# Patient Record
Sex: MALE | Race: ASIAN | NOT HISPANIC OR LATINO | URBAN - METROPOLITAN AREA
[De-identification: names, ages, dates, MRNs, and addresses within clinical notes are randomized per-mention and may not be internally consistent; named-entity substitution may affect disease eponyms.]

---

## 2015-10-30 RX ORDER — MEMANTINE HYDROCHLORIDE 10 MG/1
1 TABLET ORAL
Qty: 0 | Refills: 0 | DISCHARGE
Start: 2015-10-30

## 2018-05-14 ENCOUNTER — EMERGENCY (EMERGENCY)
Facility: HOSPITAL | Age: 78
LOS: 1 days | Discharge: HOME | End: 2018-05-14
Attending: EMERGENCY MEDICINE | Admitting: EMERGENCY MEDICINE
Payer: MEDICARE

## 2018-05-14 VITALS
TEMPERATURE: 98 F | RESPIRATION RATE: 18 BRPM | DIASTOLIC BLOOD PRESSURE: 85 MMHG | OXYGEN SATURATION: 99 % | HEART RATE: 88 BPM | SYSTOLIC BLOOD PRESSURE: 127 MMHG

## 2018-05-14 VITALS
RESPIRATION RATE: 20 BRPM | SYSTOLIC BLOOD PRESSURE: 122 MMHG | TEMPERATURE: 98 F | DIASTOLIC BLOOD PRESSURE: 87 MMHG | OXYGEN SATURATION: 97 % | HEART RATE: 90 BPM | WEIGHT: 179.9 LBS

## 2018-05-14 LAB
ALBUMIN SERPL ELPH-MCNC: 4.4 G/DL — SIGNIFICANT CHANGE UP (ref 3.3–5)
ALP SERPL-CCNC: 95 U/L — SIGNIFICANT CHANGE UP (ref 40–120)
ALT FLD-CCNC: 17 U/L — SIGNIFICANT CHANGE UP (ref 10–45)
ANION GAP SERPL CALC-SCNC: 12 MMOL/L — SIGNIFICANT CHANGE UP (ref 5–17)
APTT BLD: 35 SEC — SIGNIFICANT CHANGE UP (ref 27.5–37.4)
AST SERPL-CCNC: 16 U/L — SIGNIFICANT CHANGE UP (ref 10–40)
BASOPHILS # BLD AUTO: 0 K/UL — SIGNIFICANT CHANGE UP (ref 0–0.2)
BASOPHILS NFR BLD AUTO: 0.5 % — SIGNIFICANT CHANGE UP (ref 0–2)
BILIRUB SERPL-MCNC: 0.7 MG/DL — SIGNIFICANT CHANGE UP (ref 0.2–1.2)
BUN SERPL-MCNC: 13 MG/DL — SIGNIFICANT CHANGE UP (ref 7–23)
CALCIUM SERPL-MCNC: 9.6 MG/DL — SIGNIFICANT CHANGE UP (ref 8.4–10.5)
CHLORIDE SERPL-SCNC: 99 MMOL/L — SIGNIFICANT CHANGE UP (ref 96–108)
CO2 SERPL-SCNC: 28 MMOL/L — SIGNIFICANT CHANGE UP (ref 22–31)
CREAT SERPL-MCNC: 1.1 MG/DL — SIGNIFICANT CHANGE UP (ref 0.5–1.3)
EOSINOPHIL # BLD AUTO: 0.2 K/UL — SIGNIFICANT CHANGE UP (ref 0–0.5)
EOSINOPHIL NFR BLD AUTO: 2 % — SIGNIFICANT CHANGE UP (ref 0–6)
GAS PNL BLDV: SIGNIFICANT CHANGE UP
GLUCOSE SERPL-MCNC: 106 MG/DL — HIGH (ref 70–99)
HCT VFR BLD CALC: 45.3 % — SIGNIFICANT CHANGE UP (ref 39–50)
HGB BLD-MCNC: 15.4 G/DL — SIGNIFICANT CHANGE UP (ref 13–17)
INR BLD: 1.14 RATIO — SIGNIFICANT CHANGE UP (ref 0.88–1.16)
LYMPHOCYTES # BLD AUTO: 1.4 K/UL — SIGNIFICANT CHANGE UP (ref 1–3.3)
LYMPHOCYTES # BLD AUTO: 15.7 % — SIGNIFICANT CHANGE UP (ref 13–44)
MAGNESIUM SERPL-MCNC: 2.3 MG/DL — SIGNIFICANT CHANGE UP (ref 1.6–2.6)
MCHC RBC-ENTMCNC: 31.8 PG — SIGNIFICANT CHANGE UP (ref 27–34)
MCHC RBC-ENTMCNC: 34.1 GM/DL — SIGNIFICANT CHANGE UP (ref 32–36)
MCV RBC AUTO: 93.2 FL — SIGNIFICANT CHANGE UP (ref 80–100)
MONOCYTES # BLD AUTO: 0.6 K/UL — SIGNIFICANT CHANGE UP (ref 0–0.9)
MONOCYTES NFR BLD AUTO: 6.7 % — SIGNIFICANT CHANGE UP (ref 2–14)
NEUTROPHILS # BLD AUTO: 6.5 K/UL — SIGNIFICANT CHANGE UP (ref 1.8–7.4)
NEUTROPHILS NFR BLD AUTO: 75 % — SIGNIFICANT CHANGE UP (ref 43–77)
PLATELET # BLD AUTO: 146 K/UL — LOW (ref 150–400)
POTASSIUM SERPL-MCNC: 4 MMOL/L — SIGNIFICANT CHANGE UP (ref 3.5–5.3)
POTASSIUM SERPL-SCNC: 4 MMOL/L — SIGNIFICANT CHANGE UP (ref 3.5–5.3)
PROT SERPL-MCNC: 7.7 G/DL — SIGNIFICANT CHANGE UP (ref 6–8.3)
PROTHROM AB SERPL-ACNC: 12.3 SEC — SIGNIFICANT CHANGE UP (ref 9.8–12.7)
RBC # BLD: 4.86 M/UL — SIGNIFICANT CHANGE UP (ref 4.2–5.8)
RBC # FLD: 11.7 % — SIGNIFICANT CHANGE UP (ref 10.3–14.5)
SODIUM SERPL-SCNC: 139 MMOL/L — SIGNIFICANT CHANGE UP (ref 135–145)
TROPONIN T SERPL-MCNC: <0.01 NG/ML — SIGNIFICANT CHANGE UP (ref 0–0.06)
WBC # BLD: 8.7 K/UL — SIGNIFICANT CHANGE UP (ref 3.8–10.5)
WBC # FLD AUTO: 8.7 K/UL — SIGNIFICANT CHANGE UP (ref 3.8–10.5)

## 2018-05-14 PROCEDURE — 80053 COMPREHEN METABOLIC PANEL: CPT

## 2018-05-14 PROCEDURE — 82330 ASSAY OF CALCIUM: CPT

## 2018-05-14 PROCEDURE — 99284 EMERGENCY DEPT VISIT MOD MDM: CPT

## 2018-05-14 PROCEDURE — 85014 HEMATOCRIT: CPT

## 2018-05-14 PROCEDURE — 83735 ASSAY OF MAGNESIUM: CPT

## 2018-05-14 PROCEDURE — 84132 ASSAY OF SERUM POTASSIUM: CPT

## 2018-05-14 PROCEDURE — 87040 BLOOD CULTURE FOR BACTERIA: CPT

## 2018-05-14 PROCEDURE — 85610 PROTHROMBIN TIME: CPT

## 2018-05-14 PROCEDURE — 82435 ASSAY OF BLOOD CHLORIDE: CPT

## 2018-05-14 PROCEDURE — 70450 CT HEAD/BRAIN W/O DYE: CPT

## 2018-05-14 PROCEDURE — 70450 CT HEAD/BRAIN W/O DYE: CPT | Mod: 26

## 2018-05-14 PROCEDURE — 85027 COMPLETE CBC AUTOMATED: CPT

## 2018-05-14 PROCEDURE — 93005 ELECTROCARDIOGRAM TRACING: CPT

## 2018-05-14 PROCEDURE — 84295 ASSAY OF SERUM SODIUM: CPT

## 2018-05-14 PROCEDURE — 83605 ASSAY OF LACTIC ACID: CPT

## 2018-05-14 PROCEDURE — 82947 ASSAY GLUCOSE BLOOD QUANT: CPT

## 2018-05-14 PROCEDURE — 82803 BLOOD GASES ANY COMBINATION: CPT

## 2018-05-14 PROCEDURE — 85730 THROMBOPLASTIN TIME PARTIAL: CPT

## 2018-05-14 PROCEDURE — 84484 ASSAY OF TROPONIN QUANT: CPT

## 2018-05-14 PROCEDURE — 99285 EMERGENCY DEPT VISIT HI MDM: CPT | Mod: GC

## 2018-05-14 NOTE — ED PROVIDER NOTE - NEUROLOGICAL, MLM
Alert and oriented, no focal deficits, no motor or sensory deficits, follows some commands, non-verbal (baseline)

## 2018-05-14 NOTE — ED PROVIDER NOTE - PROGRESS NOTE DETAILS
ODILIA Kat MD: Results without acute abnormalities. Discussed results with family. Pt. able to ambulate without assistance, they would like to take him home. It seems as though pt is having behavioral disturbance 2/2 dementia. Pt has a part time aide at home and his wife is at home 24/7. Recommend d/w PMD re: increasing aide services at home. Return precautions given to pt. Knows to f/u with PMD in 1-2 days and to return to the ED sooner for any worsening/concerning sx.

## 2018-05-14 NOTE — ED PROVIDER NOTE - OBJECTIVE STATEMENT
78M w/PMH amyloid dz w/4 prior ICH (last Oct 2015), dementia, non-verbal at a baseline p/w AMS. +agitation and acting abnormally (not greeting family members, loss of urinary continence, trying to leave home). +URI sx and recent cough; started on ?azithromycin 2d ago with OTC cough medicine (by PMD Dr. Cuca Batres). Denies falls, f/c, cp/sob, abd pain, n/v/d/c, melena/BRBPR, dysuria, urinary malodor, hematuria (though limited history 2/2 MS - provided by family). Less ambulatory than usual but has walked some. No seizure like episodes. No recent hospitalizations/sick contacts.

## 2018-05-14 NOTE — ED ADULT NURSE NOTE - PMH
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

## 2018-05-14 NOTE — ED ADULT NURSE NOTE - OBJECTIVE STATEMENT
78 year old male presents to ED through waiting room complaining of increasing confusion x 3 days. PMH dementia and of stroke 2 years ago. Patient is unable to speak since stroke and has some residual right sided weakness. Patient's son and wife at bedside reporting patient ambulates independently at home but has been less mobile x 3 days. Patient's son reports patient has been less expressive x 3 days "doesn't smile to greet him," and "hasn't been himself." Patient's wife reports patient has been coughing and "has a cold" for one week and was prescribed antibiotics. Pt. had 3 episodes of incontinence to urine in past 24 hours. Denies recent falls, headache, dizziness, fever, chills, nausea, vomiting, diarrhea, dysuria. Patient is awake, alert, following commands. 20G peripheral IV placed in right AC. Labs drawn and sent to lab. MD at bedside.

## 2018-05-14 NOTE — ED PROVIDER NOTE - MEDICAL DECISION MAKING DETAILS
Linda: 78M w/PMH amyloid dz w/4 prior ICH, dementia p/w AMS; r/o ICH/CVA, infection. CTH, labs, IVF, CXR, urine studies, reassess, anticipate admission. Linda: 78M w/PMH amyloid dz w/4 prior ICH, dementia p/w AMS; r/o ICH/CVA, infection. CTH, labs, IVF, CXR, urine studies, reassess, anticipate admission.    ODILIA Kat MD: Pt is a 79 y/o male with PMH amyloid dz w/4 prior ICH (last Oct 2015), dementia, non-verbal at baseline who is BIB family for AMS. Per family, he is more agitation than normal and acting abnormally (not greeting family members, loss of urinary continence, trying to leave home). He is also less active than usual. +URI sx and recent cough; started on ?azithromycin 2d ago with OTC cough medicine (by PMD Dr. Cuca Batres). Denies falls, f/c, cp/sob, abd pain, n/v/d/c, melena/BRBPR, dysuria, urinary malodor, hematuria (though limited history 2/2 MS - provided by family). Less ambulatory than usual but has walked some. No seizure like episodes. No recent hospitalizations/sick contacts. DDx: infectious process, metabolic process, behavioral disturbance, encephalopathy, ICH. Plan: CTH, basic labs, infectious w/u

## 2018-05-19 LAB
CULTURE RESULTS: SIGNIFICANT CHANGE UP
CULTURE RESULTS: SIGNIFICANT CHANGE UP
SPECIMEN SOURCE: SIGNIFICANT CHANGE UP
SPECIMEN SOURCE: SIGNIFICANT CHANGE UP

## 2019-05-01 ENCOUNTER — OUTPATIENT (OUTPATIENT)
Dept: OUTPATIENT SERVICES | Facility: HOSPITAL | Age: 79
LOS: 1 days | End: 2019-05-01
Payer: MEDICARE

## 2019-05-01 PROCEDURE — G9001: CPT

## 2019-05-20 ENCOUNTER — INPATIENT (INPATIENT)
Facility: HOSPITAL | Age: 79
LOS: 2 days | Discharge: NURSING, REHAB OR LONG-TERM CARE | DRG: 64 | End: 2019-05-23
Attending: PSYCHIATRY & NEUROLOGY | Admitting: PSYCHIATRY & NEUROLOGY
Payer: MEDICARE

## 2019-05-20 VITALS
TEMPERATURE: 98 F | HEART RATE: 75 BPM | DIASTOLIC BLOOD PRESSURE: 76 MMHG | SYSTOLIC BLOOD PRESSURE: 123 MMHG | RESPIRATION RATE: 18 BRPM | OXYGEN SATURATION: 97 %

## 2019-05-20 DIAGNOSIS — I61.9 NONTRAUMATIC INTRACEREBRAL HEMORRHAGE, UNSPECIFIED: ICD-10-CM

## 2019-05-20 LAB
ALBUMIN SERPL ELPH-MCNC: 4.1 G/DL — SIGNIFICANT CHANGE UP (ref 3.3–5)
ALP SERPL-CCNC: 81 U/L — SIGNIFICANT CHANGE UP (ref 40–120)
ALT FLD-CCNC: 21 U/L — SIGNIFICANT CHANGE UP (ref 10–45)
ANION GAP SERPL CALC-SCNC: 11 MMOL/L — SIGNIFICANT CHANGE UP (ref 5–17)
ANION GAP SERPL CALC-SCNC: 11 MMOL/L — SIGNIFICANT CHANGE UP (ref 5–17)
AST SERPL-CCNC: 50 U/L — HIGH (ref 10–40)
BASOPHILS # BLD AUTO: 0 K/UL — SIGNIFICANT CHANGE UP (ref 0–0.2)
BASOPHILS NFR BLD AUTO: 0 % — SIGNIFICANT CHANGE UP (ref 0–2)
BILIRUB SERPL-MCNC: 0.6 MG/DL — SIGNIFICANT CHANGE UP (ref 0.2–1.2)
BUN SERPL-MCNC: 15 MG/DL — SIGNIFICANT CHANGE UP (ref 7–23)
BUN SERPL-MCNC: 15 MG/DL — SIGNIFICANT CHANGE UP (ref 7–23)
CALCIUM SERPL-MCNC: 9.3 MG/DL — SIGNIFICANT CHANGE UP (ref 8.4–10.5)
CALCIUM SERPL-MCNC: 9.4 MG/DL — SIGNIFICANT CHANGE UP (ref 8.4–10.5)
CHLORIDE SERPL-SCNC: 103 MMOL/L — SIGNIFICANT CHANGE UP (ref 96–108)
CHLORIDE SERPL-SCNC: 103 MMOL/L — SIGNIFICANT CHANGE UP (ref 96–108)
CO2 SERPL-SCNC: 24 MMOL/L — SIGNIFICANT CHANGE UP (ref 22–31)
CO2 SERPL-SCNC: 25 MMOL/L — SIGNIFICANT CHANGE UP (ref 22–31)
CREAT SERPL-MCNC: 0.85 MG/DL — SIGNIFICANT CHANGE UP (ref 0.5–1.3)
CREAT SERPL-MCNC: 0.88 MG/DL — SIGNIFICANT CHANGE UP (ref 0.5–1.3)
EOSINOPHIL # BLD AUTO: 0.2 K/UL — SIGNIFICANT CHANGE UP (ref 0–0.5)
EOSINOPHIL NFR BLD AUTO: 3.2 % — SIGNIFICANT CHANGE UP (ref 0–6)
GLUCOSE SERPL-MCNC: 92 MG/DL — SIGNIFICANT CHANGE UP (ref 70–99)
GLUCOSE SERPL-MCNC: 92 MG/DL — SIGNIFICANT CHANGE UP (ref 70–99)
HCT VFR BLD CALC: 42.2 % — SIGNIFICANT CHANGE UP (ref 39–50)
HGB BLD-MCNC: 14.4 G/DL — SIGNIFICANT CHANGE UP (ref 13–17)
LYMPHOCYTES # BLD AUTO: 1.3 K/UL — SIGNIFICANT CHANGE UP (ref 1–3.3)
LYMPHOCYTES # BLD AUTO: 22.3 % — SIGNIFICANT CHANGE UP (ref 13–44)
MCHC RBC-ENTMCNC: 31.5 PG — SIGNIFICANT CHANGE UP (ref 27–34)
MCHC RBC-ENTMCNC: 34 GM/DL — SIGNIFICANT CHANGE UP (ref 32–36)
MCV RBC AUTO: 92.6 FL — SIGNIFICANT CHANGE UP (ref 80–100)
MONOCYTES # BLD AUTO: 0.2 K/UL — SIGNIFICANT CHANGE UP (ref 0–0.9)
MONOCYTES NFR BLD AUTO: 4.3 % — SIGNIFICANT CHANGE UP (ref 2–14)
NEUTROPHILS # BLD AUTO: 4 K/UL — SIGNIFICANT CHANGE UP (ref 1.8–7.4)
NEUTROPHILS NFR BLD AUTO: 70.2 % — SIGNIFICANT CHANGE UP (ref 43–77)
PLATELET # BLD AUTO: 139 K/UL — LOW (ref 150–400)
POTASSIUM SERPL-MCNC: 4.2 MMOL/L — SIGNIFICANT CHANGE UP (ref 3.5–5.3)
POTASSIUM SERPL-MCNC: 5.8 MMOL/L — HIGH (ref 3.5–5.3)
POTASSIUM SERPL-SCNC: 4.2 MMOL/L — SIGNIFICANT CHANGE UP (ref 3.5–5.3)
POTASSIUM SERPL-SCNC: 5.8 MMOL/L — HIGH (ref 3.5–5.3)
PROT SERPL-MCNC: 7.3 G/DL — SIGNIFICANT CHANGE UP (ref 6–8.3)
RBC # BLD: 4.56 M/UL — SIGNIFICANT CHANGE UP (ref 4.2–5.8)
RBC # FLD: 12.2 % — SIGNIFICANT CHANGE UP (ref 10.3–14.5)
SODIUM SERPL-SCNC: 138 MMOL/L — SIGNIFICANT CHANGE UP (ref 135–145)
SODIUM SERPL-SCNC: 139 MMOL/L — SIGNIFICANT CHANGE UP (ref 135–145)
TSH SERPL-MCNC: 3.69 UIU/ML — SIGNIFICANT CHANGE UP (ref 0.27–4.2)
WBC # BLD: 5.6 K/UL — SIGNIFICANT CHANGE UP (ref 3.8–10.5)
WBC # FLD AUTO: 5.6 K/UL — SIGNIFICANT CHANGE UP (ref 3.8–10.5)

## 2019-05-20 PROCEDURE — 71045 X-RAY EXAM CHEST 1 VIEW: CPT | Mod: 26

## 2019-05-20 PROCEDURE — 70450 CT HEAD/BRAIN W/O DYE: CPT | Mod: 26

## 2019-05-20 PROCEDURE — 93010 ELECTROCARDIOGRAM REPORT: CPT

## 2019-05-20 PROCEDURE — 99221 1ST HOSP IP/OBS SF/LOW 40: CPT

## 2019-05-20 PROCEDURE — 99285 EMERGENCY DEPT VISIT HI MDM: CPT | Mod: GC,25

## 2019-05-20 RX ORDER — LEVETIRACETAM 250 MG/1
500 TABLET, FILM COATED ORAL ONCE
Refills: 0 | Status: COMPLETED | OUTPATIENT
Start: 2019-05-20 | End: 2019-05-20

## 2019-05-20 RX ORDER — SODIUM CHLORIDE 9 MG/ML
1000 INJECTION INTRAMUSCULAR; INTRAVENOUS; SUBCUTANEOUS
Refills: 0 | Status: DISCONTINUED | OUTPATIENT
Start: 2019-05-20 | End: 2019-05-23

## 2019-05-20 RX ADMIN — SODIUM CHLORIDE 50 MILLILITER(S): 9 INJECTION INTRAMUSCULAR; INTRAVENOUS; SUBCUTANEOUS at 20:07

## 2019-05-20 RX ADMIN — LEVETIRACETAM 400 MILLIGRAM(S): 250 TABLET, FILM COATED ORAL at 12:52

## 2019-05-20 NOTE — CONSULT NOTE ADULT - SUBJECTIVE AND OBJECTIVE BOX
79 year Hungarian Speaking man with PMH of 5 ICH - first ICH 8 years ago, known history of amyloid angiopathy was BIB family for evaluation of difficulty ambulation. CT head showed right posterior frontal parasaggital area.   At baseline, patient has limited mobility, needs assistance by another person for moving around, needs assistance with feeding, non-verbal at baseline, difficulty with following commands    No Known Allergies:     Home Medications:   * Patient Currently Takes Medications as of 04-Nov-2015 08:16 documented in Structured Notes  · 	senna oral tablet: 2 tab(s) by gastrostomy tube once a day (at bedtime)  · 	bisacodyl 5 mg oral delayed release tablet: 1 tab(s) by gastrostomy tube prn  · 	hydrocortisone 0.5% topical ointment: 1 application topically once a day to affected areas  · 	potassium chloride 20 mEq/15 mL oral liquid: 15 milliliter(s) by gastrostomy tube once and should be reassesed in one week  · 	pantoprazole 40 mg intravenous injection: 40 milligram(s) by gastrostomy tube once a day  · 	memantine 10 mg oral tablet: 1 tab(s) by gastrostomy tube once a day  · 	losartan 25 mg oral tablet: 1 tab(s) by gastrostomy tube once a day  · 	acetaminophen 500 mg oral tablet:  by gastrostomy tube prn every 6 hours for pain or fever  · 	docusate sodium 100 mg/25 mL oral syrup: 100 milligram(s) by gastrostomy tube 3 times, As Needed    .    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     PAST SURGICAL HISTORY:  No significant past surgical history.     FAMILY HISTORY:  No pertinent family history in first degree relatives.     No Pertinent Family History in first degree relatives of: not known.    T(C): 36.6 (05-20-19 @ 19:46), Max: 36.9 (05-20-19 @ 17:05)  HR: 77 (05-20-19 @ 19:46) (73 - 84)  BP: 159/80 (05-20-19 @ 19:46) (123/76 - 159/80)  RR: 18 (05-20-19 @ 19:46) (17 - 18)  SpO2: 96% (05-20-19 @ 19:46) (96% - 98%)    MEDICATIONS  (STANDING):  sodium chloride 0.9%. 1000 milliLiter(s) (50 mL/Hr) IV Continuous <Continuous>    MEDICATIONS  (PRN):          LABS:                        14.4   5.6   )-----------( 139      ( 20 May 2019 10:47 )             42.2     05-20    139  |  103  |  15  ----------------------------<  92  4.2   |  25  |  0.88    Ca    9.4      20 May 2019 11:29    TPro  7.3  /  Alb  4.1  /  TBili  0.6  /  DBili  x   /  AST  50<H>  /  ALT  21  /  AlkPhos  81  05-20      CAPILLARY BLOOD GLUCOSE      POCT Blood Glucose.: 98 mg/dL (20 May 2019 12:12)    < from: CT Head No Cont (05.20.19 @ 11:13) >  New small amount of hemorrhage in the right posterior   frontal parasagittal region which may be parenchymal or subarachnoid.   Left frontal temporal encephalomalacia and gliosis related to previous   hemorrhage. Atrophy    < end of copied text >

## 2019-05-20 NOTE — ED ADULT NURSE NOTE - NSIMPLEMENTINTERV_GEN_ALL_ED
Implemented All Fall Risk Interventions:  [place] to call system. Call bell, personal items and telephone within reach. Instruct patient to call for assistance. Room bathroom lighting operational. Non-slip footwear when patient is off stretcher. Physically safe environment: no spills, clutter or unnecessary equipment. Stretcher in lowest position, wheels locked, appropriate side rails in place. Provide visual cue, wrist band, yellow gown, etc. Monitor gait and stability. Monitor for mental status changes and reorient to person, place, and time. Review medications for side effects contributing to fall risk. Reinforce activity limits and safety measures with patient and family.

## 2019-05-20 NOTE — CONSULT NOTE ADULT - ATTENDING COMMENTS
I reviewed the resident's note and agree. The patient is a 79-year-old male, with a history of multiple bilateral hemorrhagic strokes in 2011, 2013 and 2015 with resultant aphasia and right-sided weakness, who presents with altered mental status, gait difficulty, and falls, found to have a new small amount of hemorrhage in the right posterior frontal parasagittal region, as well as areas of gliosis/encephalomalacia in the left frontal, left temporal, left parietal, and right frontal lobes related to prior hemorrhages. No acute neurosurgical intervention is indicated at this time. Recommend a repeat CT of the head without contrast to assess interval stability. Consider seizure prophylaxis for now. I saw and evaluated the patient. I reviewed the resident's note and agree. The patient is a 79-year-old male, with a history of multiple bilateral hemorrhagic strokes in 2011, 2013 and 2015 with resultant aphasia and right-sided weakness, who presents with altered mental status, gait difficulty, and falls, found to have a new small amount of hemorrhage in the right posterior frontal parasagittal region, as well as areas of gliosis/encephalomalacia in the left frontal, left temporal, left parietal, and right frontal lobes related to prior hemorrhages. No acute neurosurgical intervention is indicated at this time. Recommend a repeat CT of the head without contrast to assess interval stability. Consider seizure prophylaxis for now.

## 2019-05-20 NOTE — H&P ADULT - ATTENDING COMMENTS
VASCULAR NEUROLOGY ATTENDING  The patient is seen and examined the history and imaging are reviewed. I agree with the resident note unless otherwise noted. Patient with presumed CAA and recurrent ICH. Agree with PT/OT/ss supportive care. Discussed with family at length all questions answered.

## 2019-05-20 NOTE — ED ADULT TRIAGE NOTE - CHIEF COMPLAINT QUOTE
As wife and per son "since Friday he has be unable to ambulate and last night while sitting on dining room chair he fell onto left side of face, no LOC. baseline nonverbal since previous CVA"

## 2019-05-20 NOTE — ED ADULT NURSE NOTE - OBJECTIVE STATEMENT
78 yo M PMH CVA x4, brought to ED for multiple falls, and increase weakness since last Friday.   fall occurred yesterday  was witnessed by family. Family denies LOC. As per family 1st fall pt fell on left side of body.  2nd fall pt fell and hit left side of face. No bruising or injury noted.  No open wounds or deformities. Able to move all extrem.  Denies CP dizziness weakness or SOB. MD at bedside. Labs drawn and sent. Imaging studies ordered.

## 2019-05-20 NOTE — H&P ADULT - NSICDXPASTMEDICALHX_GEN_ALL_CORE_FT
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

## 2019-05-20 NOTE — ED ADULT NURSE NOTE - CHPI ED NUR SYMPTOMS NEG
no vomiting/no loss of consciousness/no bleeding/no tingling/no abrasion/no fever/no numbness/no confusion/no deformity

## 2019-05-20 NOTE — ED PROVIDER NOTE - ATTENDING CONTRIBUTION TO CARE
attending Carri: 79yM h/o prior hemorrhagic strokes with R sided weakness, baseline non-verbal with cognitive impairment brought in by family for 4 days of inability to ambulate and increased generalized weakness. Multiple falls. Increased agitation. Possible recurrent stroke vs acute delirium. Will obtain labs, UA, CTH/Cspine, admit

## 2019-05-20 NOTE — ED PROVIDER NOTE - OBJECTIVE STATEMENT
79y male with PMH of multiple hemorrhagic strokes with right sided weakness, non-verbal and cognitive impairment presenting with increased weakness, multiple falls and change from baseline.  For the past 4 days he is no longer able to ambulate, he normally walks without assistance.  Family states he no longer follows commands, he has become aggitated and aggressive, grabbing his wife 79y male with PMH of multiple hemorrhagic strokes with right sided weakness, non-verbal and cognitive impairment presenting with increased weakness, multiple falls and change from baseline.  For the past 4 days he is no longer able to ambulate, he normally walks without assistance.  Family states he no longer follows commands, he has become aggitated and aggressive, grabbing his wife.

## 2019-05-20 NOTE — ED PROVIDER NOTE - CLINICAL SUMMARY MEDICAL DECISION MAKING FREE TEXT BOX
79y male with change in mental status.  Possible acute stroke, acute infection worsening symptoms from previous strokes.  Will get labs, ct head and neck.

## 2019-05-20 NOTE — H&P ADULT - NSHPLABSRESULTS_GEN_ALL_CORE
< from: CT Head No Cont (05.20.19 @ 11:13) >    IMPRESSION:  New small amount of hemorrhage in the right posterior   frontal parasagittal region which may be parenchymal or subarachnoid.   Left frontal temporal encephalomalacia and gliosis related to previous   hemorrhage. Atrophy.    Dr. Donovan discussed these findings with Dr. Christina on 5/20/2019 11:20 AM   with read back.    < end of copied text >

## 2019-05-20 NOTE — CONSULT NOTE ADULT - SUBJECTIVE AND OBJECTIVE BOX
p ([id number])     HPI: 79 yr old M h/o multiple hemorrhagic strokes with right sided weakness, non-verbal and cognitive impairment presenting with increased weakness, multiple falls and change from baseline.  For the past 4 days he is no longer able to ambulate, he normally walks without assistance. Family states he no longer follows commands, he has become agitated and aggressive. No vomiting.      PAST MEDICAL HISTORY   CVA (cerebral infarction)  Amyloid disease    PAST SURGICAL HISTORY   No significant past surgical history        MEDICATIONS:  Antibiotics:    Neuro:    Anticoagulation:    Other:      SOCIAL HISTORY:   Occupation:   Marital Status:     FAMILY HISTORY:  No pertinent family history in first degree relatives      PHYSICAL EXAMINATION:   T(C): 36.8 (05-20-19 @ 09:31), Max: 36.8 (05-20-19 @ 09:31)  HR: 75 (05-20-19 @ 09:31) (75 - 75)  BP: 123/76 (05-20-19 @ 09:31) (123/76 - 123/76)  RR: 18 (05-20-19 @ 09:31) (18 - 18)  SpO2: 97% (05-20-19 @ 09:31) (97% - 97%)  Wt(kg): --    General Examination:     Neurologic Examination:                 LABS:                        14.4   5.6   )-----------( 139      ( 20 May 2019 10:47 )             42.2     05-20    138  |  103  |  15  ----------------------------<  92  5.8<H>   |  24  |  0.85    Ca    9.3      20 May 2019 10:47    TPro  7.3  /  Alb  4.1  /  TBili  0.6  /  DBili  x   /  AST  50<H>  /  ALT  21  /  AlkPhos  81  05-20          RADIOLOGY & ADDITIONAL STUDIES: p ([id number])     HPI: 79 yr old M h/o multiple hemorrhagic strokes with right sided weakness, non-verbal and cognitive impairment presenting with increased weakness, multiple falls and change from baseline.  For the past 4 days he is no longer able to ambulate, he normally walks without assistance. Family states he no longer follows commands, he has become agitated and aggressive. No vomiting.      PAST MEDICAL HISTORY   CVA (cerebral infarction)  Amyloid disease    PAST SURGICAL HISTORY   No significant past surgical history        MEDICATIONS:  Antibiotics:    Neuro:    Anticoagulation:    Other:      SOCIAL HISTORY:   Occupation:   Marital Status:     FAMILY HISTORY:  No pertinent family history in first degree relatives      PHYSICAL EXAMINATION:   T(C): 36.8 (05-20-19 @ 09:31), Max: 36.8 (05-20-19 @ 09:31)  HR: 75 (05-20-19 @ 09:31) (75 - 75)  BP: 123/76 (05-20-19 @ 09:31) (123/76 - 123/76)  RR: 18 (05-20-19 @ 09:31) (18 - 18)  SpO2: 97% (05-20-19 @ 09:31) (97% - 97%)  Wt(kg): --    General Examination:     Neurologic Examination:           Awake, non verbal, OE spont, FC, PERRL, tongue midline, no facial  MICHAELS well antigravity and spontaneously       LABS:                        14.4   5.6   )-----------( 139      ( 20 May 2019 10:47 )             42.2     05-20    138  |  103  |  15  ----------------------------<  92  5.8<H>   |  24  |  0.85    Ca    9.3      20 May 2019 10:47    TPro  7.3  /  Alb  4.1  /  TBili  0.6  /  DBili  x   /  AST  50<H>  /  ALT  21  /  AlkPhos  81  05-20          RADIOLOGY & ADDITIONAL STUDIES:

## 2019-05-20 NOTE — H&P ADULT - HISTORY OF PRESENT ILLNESS
79 year Hebrew Speaking man with PMH of 5 ICH - first ICH 8 years ago, known history of amyloid angiopathy was BIB family for evaluation of difficulty ambulation. CT head showed right posterior frontal parasaggital area.   At baseline, patient has limited mobility, needs assistance by another person for moving around, needs assistance with feeding, non-verbal at baseline, difficulty with following commands. 79 year Kyrgyz Speaking man with PMH of 5 ICH - first ICH 8 years ago, known history of amyloid angiopathy was BIB family for evaluation of difficulty ambulation. CT head showed right posterior frontal parasaggital area.   At baseline, patient has limited mobility, needs assistance by another person for moving around, needs assistance with feeding, non-verbal at baseline, difficulty with following commands. ICH score is 0 and NIHSS is 8.

## 2019-05-20 NOTE — H&P ADULT - ASSESSMENT
79 year Urdu Speaking man with PMH of 5 ICH - first ICH 8 years ago, known history of amyloid angiopathy was BIB family for evaluation of difficulty ambulation. CT head showed right posterior frontal parasaggital area.   At baseline, patient has limited mobility, needs assistance by another person for moving around, needs assistance with feeding, non-verbal at baseline, difficulty with following commands. ICH score is 0 and NIHSS is 8. Neurological exam showed difficulty with following commands, difficulty with naming, minimal movmemet in left lower extremity and drift in right lower extremity.     Impression     Right posterior parasaggital area hemorrhage secondary to amyloid angiopathy     Plan     Permissive /90  MRI brain w/o cont  No aspirin and DVT prophylaxis   Telemetry monitoring  PT/OT

## 2019-05-21 DIAGNOSIS — Z71.89 OTHER SPECIFIED COUNSELING: ICD-10-CM

## 2019-05-21 LAB
APPEARANCE UR: CLEAR — SIGNIFICANT CHANGE UP
APTT BLD: 35.8 SEC — SIGNIFICANT CHANGE UP (ref 27.5–36.3)
BACTERIA # UR AUTO: NEGATIVE — SIGNIFICANT CHANGE UP
BILIRUB UR-MCNC: NEGATIVE — SIGNIFICANT CHANGE UP
COLOR SPEC: SIGNIFICANT CHANGE UP
DIFF PNL FLD: NEGATIVE — SIGNIFICANT CHANGE UP
EPI CELLS # UR: 2 /HPF — SIGNIFICANT CHANGE UP
GLUCOSE UR QL: NEGATIVE — SIGNIFICANT CHANGE UP
HYALINE CASTS # UR AUTO: 1 /LPF — SIGNIFICANT CHANGE UP (ref 0–2)
INR BLD: 1.03 RATIO — SIGNIFICANT CHANGE UP (ref 0.88–1.16)
KETONES UR-MCNC: ABNORMAL
LEUKOCYTE ESTERASE UR-ACNC: NEGATIVE — SIGNIFICANT CHANGE UP
NITRITE UR-MCNC: NEGATIVE — SIGNIFICANT CHANGE UP
PH UR: 7 — SIGNIFICANT CHANGE UP (ref 5–8)
PROT UR-MCNC: NEGATIVE — SIGNIFICANT CHANGE UP
PROTHROM AB SERPL-ACNC: 11.7 SEC — SIGNIFICANT CHANGE UP (ref 10–13.1)
RBC CASTS # UR COMP ASSIST: 4 /HPF — SIGNIFICANT CHANGE UP (ref 0–4)
SP GR SPEC: 1.01 — SIGNIFICANT CHANGE UP (ref 1.01–1.02)
UROBILINOGEN FLD QL: NEGATIVE — SIGNIFICANT CHANGE UP
WBC UR QL: 1 /HPF — SIGNIFICANT CHANGE UP (ref 0–5)

## 2019-05-21 PROCEDURE — 70450 CT HEAD/BRAIN W/O DYE: CPT | Mod: 26

## 2019-05-21 RX ORDER — HYDRALAZINE HCL 50 MG
5 TABLET ORAL EVERY 6 HOURS
Refills: 0 | Status: DISCONTINUED | OUTPATIENT
Start: 2019-05-21 | End: 2019-05-23

## 2019-05-21 RX ORDER — METOPROLOL TARTRATE 50 MG
5 TABLET ORAL EVERY 6 HOURS
Refills: 0 | Status: DISCONTINUED | OUTPATIENT
Start: 2019-05-21 | End: 2019-05-23

## 2019-05-21 RX ADMIN — Medication 5 MILLIGRAM(S): at 10:14

## 2019-05-21 RX ADMIN — SODIUM CHLORIDE 50 MILLILITER(S): 9 INJECTION INTRAMUSCULAR; INTRAVENOUS; SUBCUTANEOUS at 18:36

## 2019-05-21 NOTE — PHYSICAL THERAPY INITIAL EVALUATION ADULT - CRITERIA FOR SKILLED THERAPEUTIC INTERVENTIONS
rehab potential/impairments found/predicted duration of therapy intervention/therapy frequency/anticipated discharge recommendation/functional limitations in following categories/risk reduction/prevention

## 2019-05-21 NOTE — PHYSICAL THERAPY INITIAL EVALUATION ADULT - PERTINENT HX OF CURRENT PROBLEM, REHAB EVAL
79 year Urdu Speaking man with PMH of 5 ICH - first ICH 8 years ago, known history of amyloid angiopathy was BIB family for evaluation of difficulty ambulation. CT head showed right posterior frontal parasaggital area.

## 2019-05-21 NOTE — PHYSICAL THERAPY INITIAL EVALUATION ADULT - ADDITIONAL COMMENTS
PMH : At baseline, patient has limited mobility, needs assistance by another person for moving around, needs assistance with feeding, non-verbal at baseline, difficulty with following commands. ICH score is 0 and NIHSS is 8.

## 2019-05-21 NOTE — OCCUPATIONAL THERAPY INITIAL EVALUATION ADULT - PERTINENT HX OF CURRENT PROBLEM, REHAB EVAL
79 year Icelandic Speaking man with PMH of 5 ICH - first ICH 8 years ago, known history of amyloid angiopathy was BIB family for evaluation of difficulty ambulation. CT head showed right posterior frontal parasaggital area. At baseline, patient has limited mobility, needs assistance by another person for moving around, needs assistance with feeding, non-verbal at baseline, difficulty with following commands.

## 2019-05-21 NOTE — PHYSICAL THERAPY INITIAL EVALUATION ADULT - RANGE OF MOTION EXAMINATION, REHAB EVAL
bilateral upper extremity ROM was WNL (within normal limits)/b/l hip and knee flexion 0-10 , ankle - wfl

## 2019-05-21 NOTE — OCCUPATIONAL THERAPY INITIAL EVALUATION ADULT - DIAGNOSIS, OT EVAL
Pt demonstrated decreased strength, balance, cognition and increased m.tone impacting pt's ability to participate in functional mobility and ADLs.

## 2019-05-21 NOTE — SWALLOW BEDSIDE ASSESSMENT ADULT - ORAL PHASE
?bolus holding; MOON up to 8 seconds/Delayed oral transit time ?bolus holding; MOON up to 9 seconds/Delayed oral transit time unable to comprehensively assess for stasis as pt unable to follow commands to open oral cavity post swallow

## 2019-05-21 NOTE — PHYSICAL THERAPY INITIAL EVALUATION ADULT - IMPAIRMENTS FOUND, PT EVAL
ROM/muscle strength/gait, locomotion, and balance/aerobic capacity/endurance/arousal, attention, and cognition

## 2019-05-21 NOTE — PROGRESS NOTE ADULT - SUBJECTIVE AND OBJECTIVE BOX
Patient is a 79y old  Male who presents with a chief complaint of ICH (20 May 2019 22:52)      INTERVAL HPI/OVERNIGHT EVENTS: noted, no new events  unable to move his rt side      Vital Signs Last 24 Hrs  T(C): 36.8 (21 May 2019 15:45), Max: 37.2 (21 May 2019 05:05)  T(F): 98.3 (21 May 2019 15:45), Max: 98.9 (21 May 2019 05:05)  HR: 93 (21 May 2019 21:00) (74 - 110)  BP: 154/92 (21 May 2019 21:00) (135/89 - 166/95)  BP(mean): --  RR: 18 (21 May 2019 21:00) (18 - 19)  SpO2: 96% (21 May 2019 21:00) (96% - 97%)    hydrALAZINE Injectable 5 milliGRAM(s) IV Push every 6 hours  metoprolol tartrate Injectable 5 milliGRAM(s) IV Push every 6 hours PRN  sodium chloride 0.9%. 1000 milliLiter(s) IV Continuous <Continuous>      PHYSICAL EXAM:  GENERAL: NAD,   EYES: conjunctiva and sclera clear  ENMT: Moist mucous membranes  NECK: Supple, No JVD, Normal thyroid  NERVOUS SYSTEM:  Alert & Oriented X0,   CHEST/LUNG: Clear to auscultation bilaterally; No rales, rhonchi, wheezing, or rubs  HEART: Regular rate and rhythm; No murmurs, rubs, or gallops  ABDOMEN: Soft, Nontender, Nondistended; Bowel sounds present  EXTREMITIES:  2+ Peripheral Pulses, No clubbing, cyanosis, or edema  LYMPH: No lymphadenopathy noted  SKIN: No rashes or lesions    Consultant(s) Notes Reviewed:  [x ] YES  [ ] NO  Care Discussed with Consultants/Other Providers [ x] YES  [ ] NO    LABS:                        14.4   5.6   )-----------( 139      ( 20 May 2019 10:47 )             42.2     05-    139  |  103  |  15  ----------------------------<  92  4.2   |  25  |  0.88    Ca    9.4      20 May 2019 11:29    TPro  7.3  /  Alb  4.1  /  TBili  0.6  /  DBili  x   /  AST  50<H>  /  ALT  21  /  AlkPhos  81  05-20    PT/INR - ( 21 May 2019 09:16 )   PT: 11.7 sec;   INR: 1.03 ratio         PTT - ( 21 May 2019 09:16 )  PTT:35.8 sec  Urinalysis Basic - ( 21 May 2019 07:04 )    Color: Light Yellow / Appearance: Clear / S.015 / pH: x  Gluc: x / Ketone: Small  / Bili: Negative / Urobili: Negative   Blood: x / Protein: Negative / Nitrite: Negative   Leuk Esterase: Negative / RBC: 4 /hpf / WBC 1 /HPF   Sq Epi: x / Non Sq Epi: 2 /hpf / Bacteria: Negative      CAPILLARY BLOOD GLUCOSE            Urinalysis Basic - ( 21 May 2019 07:04 )    Color: Light Yellow / Appearance: Clear / S.015 / pH: x  Gluc: x / Ketone: Small  / Bili: Negative / Urobili: Negative   Blood: x / Protein: Negative / Nitrite: Negative   Leuk Esterase: Negative / RBC: 4 /hpf / WBC 1 /HPF   Sq Epi: x / Non Sq Epi: 2 /hpf / Bacteria: Negative        RADIOLOGY & ADDITIONAL TESTS:    Imaging Personally Reviewed:  [ ] YES  [ ] NO

## 2019-05-21 NOTE — SWALLOW BEDSIDE ASSESSMENT ADULT - SWALLOW EVAL: RECOMMENDED FEEDING/EATING TECHNIQUES
allow for swallow between intakes/crush medication (when feasible)/no straws/small sips/bites/maintain upright posture during/after eating for 30 mins

## 2019-05-21 NOTE — SWALLOW BEDSIDE ASSESSMENT ADULT - SLP PERTINENT HISTORY OF CURRENT PROBLEM
79 year Icelandic Speaking man with PMH of 5 ICH - first ICH 8 years ago, known history of amyloid angiopathy was BIB family for evaluation of difficulty ambulation. CT head showed right posterior frontal parasaggital area. At baseline, patient has limited mobility, needs assistance by another person for moving around, needs assistance with feeding, non-verbal at baseline, difficulty with following commands. ICH score is 0 and NIHSS is 8. Neurological exam showed difficulty with following commands, difficulty with naming, minimal movmemet in left lower extremity and drift in right lower extremity. Impression: Right posterior parasaggital area hemorrhage secondary to amyloid angiopathy.

## 2019-05-21 NOTE — SWALLOW BEDSIDE ASSESSMENT ADULT - SWALLOW EVAL: DIAGNOSIS
Patient presents with 1) oral and suspected pharyngeal dysphagia characterized by prolonged mastication of mechanical soft solids, delayed oral transit time/suspected bolus holding, delayed trigger of the swallow, suspected decreased laryngeal elevation upon palpation, intermittent repeat swallows which may be indicative of pharyngeal stasis, and no overt s/s of laryngeal penetration/aspiration.  2) cognitive linguistic deficits.

## 2019-05-21 NOTE — SWALLOW BEDSIDE ASSESSMENT ADULT - PHARYNGEAL PHASE
intermittent repeat swallows/Delayed pharyngeal swallow/Decreased laryngeal elevation Delayed pharyngeal swallow/Decreased laryngeal elevation

## 2019-05-21 NOTE — OCCUPATIONAL THERAPY INITIAL EVALUATION ADULT - LIVES WITH, PROFILE
Pt lives with spouse, son and daughter in law in a private house +3 steps to enter, pt refuses to use RW and was ambulating with assistance until last week, Last week pt fell 2 times and was having increased difficulty all OOB activity.

## 2019-05-21 NOTE — OCCUPATIONAL THERAPY INITIAL EVALUATION ADULT - ADDITIONAL COMMENTS
CTH:   New small amount of hemorrhage in the right posterior frontal parasagittal region which may be parenchymal or subarachnoid. Left frontal temporal encephalomalacia and gliosis related to previous hemorrhage. Atrophy.

## 2019-05-21 NOTE — PHYSICAL THERAPY INITIAL EVALUATION ADULT - LIVES WITH, PROFILE
Pt lives with spouse, son and daughter in law in a private house +3 steps to enter, pt refuses to use RW and was ambulating with assistance until last week, Last week pt fell 2 times and was having increased difficulty all OOB activity. from past 2 weeks patien has declines with functional activity as per son and spouse and needs lot of assistance/spouse

## 2019-05-21 NOTE — SWALLOW BEDSIDE ASSESSMENT ADULT - ASR SWALLOW RECOMMEND DIAG
PLEASE ENTER ORDER FOR MODIFIED BARIUM SWALLOW STUDY (ENTER UNDER RADIOLOGY EXAMS THE FOLLOWING WAY: GO TO THE BROWSER AND TYPE IN XRAY, THEN HIT THE SPACEBAR, AND TYPE IN THE LETTER M.)  WILL SCHEDULE EXAM UPON RECEIPT IN RADIOLOGY./VFSS/MBS

## 2019-05-21 NOTE — OCCUPATIONAL THERAPY INITIAL EVALUATION ADULT - ORIENTATION, REHAB EVAL
Pt's family available to translate to Persian, pt perseverates on yes and no, and does not answer appropriately

## 2019-05-21 NOTE — OCCUPATIONAL THERAPY INITIAL EVALUATION ADULT - STRENGTHENING, PT EVAL
GOAL: Pt will improve bilateral UE/LE strength to 3/5 to increase independence in ADLs within 4 weeks

## 2019-05-21 NOTE — SWALLOW BEDSIDE ASSESSMENT ADULT - ASR SWALLOW ASPIRATION MONITOR
fever/gurgly voice/pneumonia/upper respiratory infection/throat clearing/change of breathing pattern/cough/Monitor for s/s aspiration/laryngeal penetration. If noted:  D/C p.o. intake, provide non-oral nutrition/hydration/meds, and contact this service @ [phone]

## 2019-05-21 NOTE — PROGRESS NOTE ADULT - ASSESSMENT
79 year Swedish Speaking man with PMH of 5 ICH - first ICH 8 years ago, known history of amyloid angiopathy was BIB family for evaluation of difficulty ambulation. CT head showed right posterior frontal parasaggital area.       IC hemorrhage secondary to amyloid angiopathy   Neuro team mgmt appreciated  Permissive /90  rpt ct head today-no change  MRI brain w/o cont  No aspirin and DVT prophylaxis   Telemetry monitoring  PT/OT   swallow eval    #HTN: hold losartan-home med-allow for permissive htn      will cont to follow    [facility]  [phone]

## 2019-05-21 NOTE — PROVIDER CONTACT NOTE (OTHER) - BACKGROUND
Admitted for difficulty ambulating, diagnosed with stroke, ICH. Patient currently NPO for failed dysphagia screen.

## 2019-05-21 NOTE — SWALLOW BEDSIDE ASSESSMENT ADULT - COMMENTS
Nsx consulted - No acute neurosurgical intervention is indicated.  CXR: Clear lungs. SWALLOW HX: Bedside swallow evaluation 9/9/15 -> Pt presents with mildly reduced mastication otherwise oral preparatory and oral stages of the swallow are judged to be wfl.  Oral clearing and a-p transfer of the bolus are adequate.  Pharyngeal swallow judged to be timely with adequate laryngeal elevation.  No s/s of laryngeal penetration or aspiration with thin liquids, solids or purees.  Inconsistent imitation of oromotor movements may be characteristic of apraxia. -> Diet rx: Soft diet.  [facility] [place] -> Patient presents with chris-pharyngeal dysphagia with marked deficits in oral management, significant delay in trigger of the swallow reflex, pharyngeal residue post swallow, and silent laryngeal penetration of the most conservative p.o. textures. -> Diet rx: NPO, with non-oral nutrition/hydration/medications. thin liquids not trialed given significant delay in swallow trigger

## 2019-05-22 DIAGNOSIS — Z51.5 ENCOUNTER FOR PALLIATIVE CARE: ICD-10-CM

## 2019-05-22 DIAGNOSIS — G93.40 ENCEPHALOPATHY, UNSPECIFIED: ICD-10-CM

## 2019-05-22 DIAGNOSIS — Z71.89 OTHER SPECIFIED COUNSELING: ICD-10-CM

## 2019-05-22 DIAGNOSIS — I61.9 NONTRAUMATIC INTRACEREBRAL HEMORRHAGE, UNSPECIFIED: ICD-10-CM

## 2019-05-22 LAB
ANION GAP SERPL CALC-SCNC: 12 MMOL/L — SIGNIFICANT CHANGE UP (ref 5–17)
APPEARANCE UR: CLEAR — SIGNIFICANT CHANGE UP
BACTERIA # UR AUTO: NEGATIVE — SIGNIFICANT CHANGE UP
BILIRUB UR-MCNC: NEGATIVE — SIGNIFICANT CHANGE UP
BUN SERPL-MCNC: 13 MG/DL — SIGNIFICANT CHANGE UP (ref 7–23)
CALCIUM SERPL-MCNC: 9.3 MG/DL — SIGNIFICANT CHANGE UP (ref 8.4–10.5)
CHLORIDE SERPL-SCNC: 101 MMOL/L — SIGNIFICANT CHANGE UP (ref 96–108)
CHOLEST SERPL-MCNC: 222 MG/DL — HIGH (ref 10–199)
CO2 SERPL-SCNC: 22 MMOL/L — SIGNIFICANT CHANGE UP (ref 22–31)
COLOR SPEC: YELLOW — SIGNIFICANT CHANGE UP
CREAT SERPL-MCNC: 0.88 MG/DL — SIGNIFICANT CHANGE UP (ref 0.5–1.3)
DIFF PNL FLD: NEGATIVE — SIGNIFICANT CHANGE UP
EPI CELLS # UR: 6 /HPF — HIGH
GLUCOSE SERPL-MCNC: 102 MG/DL — HIGH (ref 70–99)
GLUCOSE UR QL: NEGATIVE — SIGNIFICANT CHANGE UP
HBA1C BLD-MCNC: 5 % — SIGNIFICANT CHANGE UP (ref 4–5.6)
HCT VFR BLD CALC: 46.5 % — SIGNIFICANT CHANGE UP (ref 39–50)
HDLC SERPL-MCNC: 44 MG/DL — SIGNIFICANT CHANGE UP
HGB BLD-MCNC: 16 G/DL — SIGNIFICANT CHANGE UP (ref 13–17)
HYALINE CASTS # UR AUTO: 2 /LPF — SIGNIFICANT CHANGE UP (ref 0–2)
KETONES UR-MCNC: ABNORMAL
LEUKOCYTE ESTERASE UR-ACNC: NEGATIVE — SIGNIFICANT CHANGE UP
LIPID PNL WITH DIRECT LDL SERPL: 163 MG/DL — HIGH
MCHC RBC-ENTMCNC: 30.4 PG — SIGNIFICANT CHANGE UP (ref 27–34)
MCHC RBC-ENTMCNC: 34.4 GM/DL — SIGNIFICANT CHANGE UP (ref 32–36)
MCV RBC AUTO: 88.2 FL — SIGNIFICANT CHANGE UP (ref 80–100)
NITRITE UR-MCNC: NEGATIVE — SIGNIFICANT CHANGE UP
PH UR: 7 — SIGNIFICANT CHANGE UP (ref 5–8)
PLATELET # BLD AUTO: 165 K/UL — SIGNIFICANT CHANGE UP (ref 150–400)
POTASSIUM SERPL-MCNC: 3.8 MMOL/L — SIGNIFICANT CHANGE UP (ref 3.5–5.3)
POTASSIUM SERPL-SCNC: 3.8 MMOL/L — SIGNIFICANT CHANGE UP (ref 3.5–5.3)
PROT UR-MCNC: ABNORMAL
RBC # BLD: 5.27 M/UL — SIGNIFICANT CHANGE UP (ref 4.2–5.8)
RBC # FLD: 12.1 % — SIGNIFICANT CHANGE UP (ref 10.3–14.5)
RBC CASTS # UR COMP ASSIST: 5 /HPF — HIGH (ref 0–4)
SODIUM SERPL-SCNC: 135 MMOL/L — SIGNIFICANT CHANGE UP (ref 135–145)
SP GR SPEC: 1.02 — SIGNIFICANT CHANGE UP (ref 1.01–1.02)
TOTAL CHOLESTEROL/HDL RATIO MEASUREMENT: 5.1 RATIO — SIGNIFICANT CHANGE UP (ref 3.4–9.6)
TRIGL SERPL-MCNC: 75 MG/DL — SIGNIFICANT CHANGE UP (ref 10–149)
UROBILINOGEN FLD QL: ABNORMAL
WBC # BLD: 7.81 K/UL — SIGNIFICANT CHANGE UP (ref 3.8–10.5)
WBC # FLD AUTO: 7.81 K/UL — SIGNIFICANT CHANGE UP (ref 3.8–10.5)
WBC UR QL: 3 /HPF — SIGNIFICANT CHANGE UP (ref 0–5)

## 2019-05-22 PROCEDURE — 74230 X-RAY XM SWLNG FUNCJ C+: CPT | Mod: 26

## 2019-05-22 PROCEDURE — 99223 1ST HOSP IP/OBS HIGH 75: CPT

## 2019-05-22 RX ORDER — HYDROCORTISONE 1 %
1 OINTMENT (GRAM) TOPICAL DAILY
Refills: 0 | Status: DISCONTINUED | OUTPATIENT
Start: 2019-05-22 | End: 2019-05-23

## 2019-05-22 RX ORDER — ENOXAPARIN SODIUM 100 MG/ML
40 INJECTION SUBCUTANEOUS EVERY 24 HOURS
Refills: 0 | Status: DISCONTINUED | OUTPATIENT
Start: 2019-05-22 | End: 2019-05-23

## 2019-05-22 RX ADMIN — Medication 5 MILLIGRAM(S): at 06:47

## 2019-05-22 RX ADMIN — ENOXAPARIN SODIUM 40 MILLIGRAM(S): 100 INJECTION SUBCUTANEOUS at 17:16

## 2019-05-22 RX ADMIN — Medication 5 MILLIGRAM(S): at 01:15

## 2019-05-22 RX ADMIN — Medication 5 MILLIGRAM(S): at 10:52

## 2019-05-22 RX ADMIN — Medication 1 APPLICATION(S): at 15:37

## 2019-05-22 NOTE — SWALLOW VFSS/MBS ASSESSMENT ADULT - ORAL PHASE COMMENTS
Oral transit time >40 seconds. Max spillover to the level of the valleculae with passive spillover to the hypopharynx - pt with poor awareness of pharyngeal stasis as evidenced by material remaining in pharynx for >30 seconds. Nectar liquid was inconsistently successful in triggering swallow/reducing pharyngeal stasis.  Moderate diffuse oral residue noted post swallow. Oral transit time noted to be ~11 seconds.  Max spillover to the valleculae and pyriform sinus. Mild/mod diffuse oral residue noted post swallow.

## 2019-05-22 NOTE — SWALLOW VFSS/MBS ASSESSMENT ADULT - ORAL PREP COMMENTS
+Bolus holding for prolonged periods of time (>25 seconds captured on fluoro, as well as additional time off fluoro to reduce pt's exposure). During final trial pt did not initiate a pharyngeal swallow and material was suctioned from oral cavity via Yankeur. +Bolus holding for ~10 seconds on fluoro (more noted off fluoro in attempt to reduce pt's exposure). During final trial pt did not initiate a pharyngeal swallow and material was suctioned from oral cavity via Yankeur.

## 2019-05-22 NOTE — SWALLOW VFSS/MBS ASSESSMENT ADULT - ORAL PHASE
Incomplete tongue to palate contact/Delayed oral transit time/Reduced anterior - posterior transport/Uncontrolled bolus / spillover in chris-pharynx/Uncontrolled bolus / spillover in hypopharynx Delayed oral transit time/Reduced anterior - posterior transport/Uncontrolled bolus / spillover in chris-pharynx/Uncontrolled bolus / spillover in hypopharynx/Incomplete tongue to palate contact

## 2019-05-22 NOTE — PROGRESS NOTE ADULT - ASSESSMENT
ASSESSMENT: 79 year Setswana Speaking man with PMH of 5 ICH - first ICH 8 years ago, known history of amyloid angiopathy was BIB family for evaluation of difficulty ambulation. CT head showed right posterior frontal parasaggital area.   ICH score is 0 and NIHSS is 8.     Impression   Right posterior parasaggital area hemorrhage secondary to cerebral amyloid angiopathy     NEURO: continue close monitoring for neurologic deterioration in setting of cerebral edema with mass effect and brain compression, maintain , home statin if applicable, MR imaging note indicated as it will not  at this time. Physical therapy/OT : SINTIA.     ANTITHROMBOTIC THERAPY: none indicated, would avoid in setting of recurrent CAA as increased risk of hemorrhage     PULMONARY: CXR clear, protecting airway, saturating well     CARDIOVASCULAR:  cardiac monitoring with no acute findings                              SBP goal: < 140mmhg    GASTROINTESTINAL:  dysphagia screen  failed, SLP eval for dysphagia diet with MBS      Diet: D1 Nectar     RENAL: BUN/Cr within range, maintain adequate hydration, good urine output      Na Goal: Greater than 135     Ortiz: n    HEMATOLOGY: H/H without acute change, no active bleeding, Platelets 165     DVT ppx: initiate LMWH for dvt ppx     ID: afebrile, no leukocytosis, no si/sx of infection     OTHER: current medical condition and plan of care d/w patient's family previously re: current conditio nand plan of care, in agreement with palliative care eval for further discussion.     DISPOSITION: [facility]       CORE MEASURES:        Admission NIHSS: 8     TPA: [] YES [x] NO      LDL/HDL: p     Depression Screen: na- unable to participate      Statin Therapy: n unless on at home      Dysphagia Screen: [] PASS [x] FAIL     Smoking [] YES [x] NO      Afib [] YES [x] NO     Stroke Education [x] YES [] NO    Obtain screening lower extremity venous ultrasound in patients who meet 1 or more of the following criteria as patient is high risk for DVT/PE on admission:   [] History of DVT/PE  []Hypercoagulable states (Factor V Leiden, Cancer, OCP, etc. )  []Prolonged immobility (hemiplegia/hemiparesis/post operative or any other extended immobilization)  [] Transferred from outside facility (Rehab or Long term care)  [] Age </= to 50 ASSESSMENT: 79 year Latvian Speaking man with PMH of 5 ICH - first ICH 8 years ago, known history of amyloid angiopathy was BIB family for evaluation of difficulty ambulation. CT head showed right posterior frontal parasaggital area.   ICH score is 0 and NIHSS is 8.     Impression   Right posterior parasaggital area hemorrhage secondary to cerebral amyloid angiopathy     NEURO: continue close monitoring for neurologic deterioration in setting of cerebral edema with mass effect and brain compression, maintain , home statin if applicable, MR imaging note indicated as it will not  at this time. Physical therapy/OT : SINTIA.     ANTITHROMBOTIC THERAPY: none indicated, would avoid in setting of recurrent CAA as increased risk of hemorrhage     PULMONARY: CXR clear, protecting airway, saturating well     CARDIOVASCULAR:  cardiac monitoring with no acute findings                              SBP goal: < 140mmhg    GASTROINTESTINAL:  dysphagia screen  failed, SLP eval for dysphagia diet with MBS      Diet: D1 Nectar     RENAL: BUN/Cr within range, maintain adequate hydration, good urine output      Na Goal: Greater than 135     Ortiz: n    HEMATOLOGY: H/H without acute change, no active bleeding, Platelets 165     DVT ppx: initiate LMWH for dvt ppx     ID: afebrile, no leukocytosis, no si/sx of infection     OTHER: current medical condition and plan of care d/w patient's family previously re: current [facility] plan of care, in agreement with palliative care eval for further discussion.     DISPOSITION: [facility]       CORE MEASURES:        Admission NIHSS: 8     TPA: [] YES [x] NO      LDL/HDL: p     Depression Screen: na- unable to participate      Statin Therapy: n unless on at home      Dysphagia Screen: [] PASS [x] FAIL     Smoking [] YES [x] NO      Afib [] YES [x] NO     Stroke Education [x] YES [] NO    Obtain screening lower extremity venous ultrasound in patients who meet 1 or more of the following criteria as patient is high risk for DVT/PE on admission:   [] History of DVT/PE  []Hypercoagulable states (Factor V Leiden, Cancer, OCP, etc. )  []Prolonged immobility (hemiplegia/hemiparesis/post operative or any other extended immobilization)  [] Transferred from outside facility (Rehab or Long term care)  [] Age </= to 50    Addendum: Patient recommended for NPO, d/w family re: maintaining NPO for now or until clear goals of care established. Per family the son who is HCP is in [place] and will be present for phone conference re: plan of care on 5/23.  Discussed risks associated if fed, family will d/w HCP and inform of decision.  Verbalized/exprsesed full understanding.

## 2019-05-22 NOTE — CONSULT NOTE ADULT - ASSESSMENT
79 year Serbian Speaking man with PMH of 5 ICH - first ICH 8 years ago, known history of amyloid angiopathy. CT head showed right posterior frontal parasaggital area. At baseline, patient has limited mobility requiring full supportive care.  Palliative care called for GOC.
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  For the past 4 days he is no longer able to ambulate, he normally walks without assistance. Family states he no longer follows commands, he has become agitated and aggressive.    Plan:  - No acute neurosurgical intervention at this time  - Keppra 500mg BID  - Repeat CTH in 4h from original scan  - Stroke neurology evaluation and management
79 year Ukrainian Speaking man with PMH of 5 ICH - first ICH 8 years ago, known history of amyloid angiopathy was BIB family for evaluation of difficulty ambulation. CT head showed right posterior frontal parasaggital area.       IC hemorrhage secondary to amyloid angiopathy   Neuro team mgmt appreciated  Permissive /90  MRI brain w/o cont  No aspirin and DVT prophylaxis   Telemetry monitoring  PT/OT     #HTN: hold losartan-home med-allow for permissive htn    will cont to follow    [facility]  [phone]

## 2019-05-22 NOTE — SWALLOW VFSS/MBS ASSESSMENT ADULT - INTACT
Majority of the swallow not captured on fluoro 2/2 pt inability to follow commands and significant delay in swallow trigger

## 2019-05-22 NOTE — CONSULT NOTE ADULT - SUBJECTIVE AND OBJECTIVE BOX
HPI:  79 year Indonesian Speaking man with PMH of 5 ICH - first ICH 8 years ago, known history of amyloid angiopathy was BIB family for evaluation of difficulty ambulation. CT head showed right posterior frontal parasaggital area.   At baseline, patient has limited mobility, needs assistance by another person for moving around, needs assistance with feeding, non-verbal at baseline, difficulty with following commands. ICH score is 0 and NIHSS is 8. (20 May 2019 12:55)    PERTINENT PM/SXH:   CVA (cerebral infarction)  Amyloid disease    No significant past surgical history    FAMILY HISTORY:  No pertinent family history in first degree relatives    ITEMS NOT CHECKED ARE NOT PRESENT    SOCIAL HISTORY:   Significant other/partner:  [ ]  Children:  [ ]  Judaism/Spirituality:  Substance hx:  [ ]   Tobacco hx:  [ ]   Alcohol hx: [ ]   Home Opioid hx:  [ ] I-Stop Reference No:  Living Situation: [ ]Home  [ ]Long term care  [ ]Rehab [ ]Other    ADVANCE DIRECTIVES:    DNR    MOLST  [ ]   Living Will  [ ]   DECISION MAKER(s):  [ ] Health Care Proxy(s)  [ ] Surrogate(s)  [ ] Guardian           Name(s): Phone Number(s):    BASELINE (I)ADL(s) (prior to admission):  [place]: [ ]Total  [ ] Moderate [x ]Dependent    Allergies  No Known Allergies    Intolerances    MEDICATIONS  (STANDING):  hydrALAZINE Injectable 5 milliGRAM(s) IV Push every 6 hours  sodium chloride 0.9%. 1000 milliLiter(s) (50 mL/Hr) IV Continuous <Continuous>    MEDICATIONS  (PRN):  metoprolol tartrate Injectable 5 milliGRAM(s) IV Push every 6 hours PRN BP > 140/90    PRESENT SYMPTOMS: [ ]Unable to obtain due to poor mentation   Source if other than patient:  [ ]Family   [ ]Team     Pain (Impact on QOL):    Location -         Minimal acceptable level (0-10 scale):                    Aggravating factors -  Quality -  Radiation -  Severity (0-10 scale) -    Timing -    PAIN AD Score:     [email or web address] (press ctrl +  left click to view)    Dyspnea:                           [ ]Mild [ ]Moderate [ ]Severe  Anxiety:                             [ ]Mild [ ]Moderate [ ]Severe  Fatigue:                             [x ]Mild [ ]Moderate [ ]Severe  Nausea:                            [ ]Mild [ ]Moderate [ ]Severe  Loss of appetite:               [ ]Mild [ ]Moderate [ ]Severe  Constipation:                    [ ]Mild [ ]Moderate [ ]Severe    Other Symptoms:  [ ]All other review of systems negative     Karnofsky Performance Score/Palliative Performance Status Version 2:   20-30   %    http://palliative.info/resource_material/[email or web address].pdf    PHYSICAL EXAM:  Vital Signs Last 24 Hrs  T(C): 36.6 (22 May 2019 08:27), Max: 37.1 (21 May 2019 12:50)  T(F): 97.9 (22 May 2019 08:27), Max: 98.7 (21 May 2019 12:50)  HR: 95 (22 May 2019 08:27) (86 - 110)  BP: 144/88 (22 May 2019 08:27) (135/89 - 162/85)  BP(mean): --  RR: 18 (22 May 2019 08:27) (18 - 18)  SpO2: 95% (22 May 2019 08:27) (95% - 97%) I&O's Summary    GENERAL:  [ ]Alert  [ ]Oriented x   [x ]Lethargic  [x ]Cachexia  [ ]Unarousable  [ ]Verbal  [x ]Non-Verbal    Behavioral:   [ ] Anxiety  [x ] Delirium [ ] Agitation [ ] Other    HEENT:  [ ]Normal   [x ]Dry mouth   [ ]ET Tube/Trach  [ ]Oral lesions    PULMONARY:   [x ]Clear [ ]Tachypnea  [ ]Audible excessive secretions   [ ]Rhonchi        [ ]Right [ ]Left [ ]Bilateral  [ ]Crackles        [ ]Right [ ]Left [ ]Bilateral  [ ]Wheezing     [ ]Right [ ]Left [ ]Bilateral    CARDIOVASCULAR:    [ ]Regular [ ]Irregular x[ ]Tachy  [ ]Cruzito [ ]Murmur [ ]Other    GASTROINTESTINAL:  [ x]Soft  [x ]Distended   [x ]+BS  [ ]Non tender [ ]Tender  [ ]PEG [ ]OGT/ NGT  Last BM:     GENITOURINARY:  [ ]Normal [x ] Incontinent   [ ]Oliguria/Anuria   [ ]Ortiz    MUSCULOSKELETAL:   [ ]Normal   [x ]Weakness  [x ]Bed/Wheelchair bound [ ]Edema    NEUROLOGIC:   [ ]No focal deficits  [x ] Cognitive impairment  [ ] Dysphagia [ ]Dysarthria [x ] R patti Paresis [ ]Other     SKIN:   [ ]Normal   [ ]Pressure ulcer(s)  [ ]Rash    CRITICAL CARE:  [ ] Shock Present  [ ] Septic [ ]Cardiogenic [ ]Neurologic [ ]Hypovolemic  [ ]  Vasopressors [ ]  Inotropes   [ ] Respiratory failure present  [ ] Acute  [ ] Chronic [ ] Hypoxic  [ ] Hypercarbic [ ] Other  [ ] Other organ failure     LABS:                        16.0   7.81  )-----------( 165      ( 22 May 2019 08:04 )             46.5       135  |  101  |  13  ----------------------------<  102<H>  3.8   |  22  |  0.88    Ca    9.3      22 May 2019 06:07    TPro  7.3  /  Alb  4.1  /  TBili  0.6  /  DBili  x   /  AST  50<H>  /  ALT  21  /  AlkPhos  81  20  PT/INR - ( 21 May 2019 09:16 )   PT: 11.7 sec;   INR: 1.03 ratio         PTT - ( 21 May 2019 09:16 )  PTT:35.8 sec    Urinalysis Basic - ( 22 May 2019 08:18 )    Color: Yellow / Appearance: Clear / S.021 / pH: x  Gluc: x / Ketone: Moderate  / Bili: Negative / Urobili: 2 mg/dL   Blood: x / Protein: Trace / Nitrite: Negative   Leuk Esterase: Negative / RBC: 5 /hpf / WBC 3 /HPF   Sq Epi: x / Non Sq Epi: 6 /hpf / Bacteria: Negative      RADIOLOGY & ADDITIONAL STUDIES:    PROTEIN CALORIE MALNUTRITION PRESENT: [ ] Yes [ ] No  [ ] PPSV2 < or = to 30% [ ] significant weight loss  [ ] poor nutritional intake [ ] catabolic state [ ] anasarca     Albumin, Serum: 4.1 g/dL (19 @ 10:47)  Artificial Nutrition [ ]     REFERRALS:   [ ]Chaplaincy  [ ] Hospice  [ ]Child Life  [ ]Social Work  [ ]Case management [ ]Holistic Therapy   Goals of Care Discussion Document:

## 2019-05-22 NOTE — SWALLOW VFSS/MBS ASSESSMENT ADULT - PHARYNGEAL PHASE COMMENTS
Majority of the swallow conducted off fluoro 2/2 pt inability to follow commands, therefore unable to objectively r/o laryngeal penetration/aspiration.  Given severe delay in oral transit/pharyngeal trigger of the swallow and pharyngeal stasis - pt remains at high risk for aspiration.

## 2019-05-22 NOTE — PROGRESS NOTE ADULT - ASSESSMENT
79 year Greenlandic Speaking man with PMH of 5 ICH - first ICH 8 years ago, known history of amyloid angiopathy was BIB family for evaluation of difficulty ambulation. CT head showed right posterior frontal parasaggital area.       IC hemorrhage secondary to amyloid angiopathy   Neuro team mgmt appreciated  Permissive /90  rpt ct head today-no change  No aspirin and DVT prophylaxis   Telemetry monitoring    #HTN: on iv hydralazine and iv metoprolol     swallow eval: noted-non oral means of nutrition  Palliative care cs re: nutritional goals, ?pleasure feeds    PT eval-SINTIA      will cont to follow    [facility]  [phone]

## 2019-05-22 NOTE — SWALLOW VFSS/MBS ASSESSMENT ADULT - RECOMMENDED CONSISTENCY
NPO, with non-oral nutrition, hydration, medications is recommended based upon the results of this examination; however, would suggest discussion with Palliative Care to determine the GOC with re: to provision of nutrition in this patient.

## 2019-05-22 NOTE — CHART NOTE - NSCHARTNOTEFT_GEN_A_CORE
Spoke to pts son Stepan on the phone, who is extremely angry and irritated that we are keeping his father NPO.  Explained that earlier in the evening, spoke to son in [place] (who is HCP) and he stated that he did not want an ngt for feeds and that he wanted comfort feeds. Explained to Stepan that we needed to get speech and swallow to rec dysphagia diet, bc after bedside evals and MBS they still recommended him to remain NPO.  Stepan stated that his father has been having this issue of pocketing food for years and his fathers eating habits have not changed in years.  He further stated very angrily and aggressively (after cursing at me multiple times) that he wanted us to put in a diet for comfort feeds immediately. Stepan is aware of all the risks of starting a comfort feed diet including aspiration and wants to start diet now.  Let Don know that I understand his frustration and concern and told him I would put in a dysphagia 1 diet now to start comfort feeds now. Palliative will have [facility] meeting tomorrow to f/u.  Spoke with nurse manager and let him know plan of dysphagia diet.

## 2019-05-22 NOTE — SWALLOW VFSS/MBS ASSESSMENT ADULT - DIAGNOSTIC IMPRESSIONS
Patient presents with oropharyngeal dysphagia superimposed upon reduced cognition.  The swallow is marked by significantly prolonged bolus holding, severely delayed oral transit time, spillover into the pharynx with pharyngeal stasis for >30 seconds indicating poor awareness of same, delayed trigger of the swallow, post swallow pharyngeal residue, and intermittent absence of pharyngeal trigger of the swallow requiring suctioning of oral cavity via Yankeur.  Although no penetration/aspiration observed on exam, pt remains at high risk for aspiration given above deficits.    Disorders: reduced lingual strength/ROM/Rate of motion, reduced BOT to posterior pharyngeal wall contact, decreased linguapalatal contact, delay in trigger of the swallow reflex, reduced hyoid excursion, reduced pharyngeal contractility.

## 2019-05-22 NOTE — SWALLOW VFSS/MBS ASSESSMENT ADULT - SLP GENERAL OBSERVATIONS
Patient received on RA, asleep and secure in KATIA chair, roused to auditory/tactile stimlui, nonverbal, does not follow commands, unable to utilize  services with pt.  Pt's son and wife present in waiting area.

## 2019-05-22 NOTE — CONSULT NOTE ADULT - PROBLEM SELECTOR RECOMMENDATION 4
will meet family at bedside to discuss ACP today Met with son Sulaiman at bedside with pts wife.  Discussed plan for rehab and LTC facility with transition to hospice. Will meet with sulaiman and phone conference brother who is HCP who is in [place] on business to discuss ACP and MOLST form.

## 2019-05-22 NOTE — PROGRESS NOTE ADULT - SUBJECTIVE AND OBJECTIVE BOX
Patient is a 79y old  Male who presents with a chief complaint of ICH (22 May 2019 10:05)      INTERVAL HPI/OVERNIGHT EVENTS: noted, no new events  sp swallow eval      Vital Signs Last 24 Hrs  T(C): 37.1 (22 May 2019 12:37), Max: 37.1 (21 May 2019 23:24)  T(F): 98.7 (22 May 2019 12:37), Max: 98.7 (21 May 2019 23:24)  HR: 80 (22 May 2019 12:37) (80 - 116)  BP: 127/79 (22 May 2019 12:37) (127/79 - 176/79)  BP(mean): --  RR: 18 (22 May 2019 12:37) (18 - 18)  SpO2: 95% (22 May 2019 12:37) (95% - 97%)    hydrALAZINE Injectable 5 milliGRAM(s) IV Push every 6 hours  hydrocortisone 1% Ointment 1 Application(s) Topical daily  metoprolol tartrate Injectable 5 milliGRAM(s) IV Push every 6 hours PRN  sodium chloride 0.9%. 1000 milliLiter(s) IV Continuous <Continuous>      PHYSICAL EXAM:  GENERAL: NAD,   EYES: conjunctiva and sclera clear  ENMT: Moist mucous membranes  NECK: Supple, No JVD, Normal thyroid  NERVOUS SYSTEM:  Alert & Oriented X0,   CHEST/LUNG: Clear to auscultation bilaterally; No rales, rhonchi, wheezing, or rubs  HEART: Regular rate and rhythm; No murmurs, rubs, or gallops  ABDOMEN: Soft, Nontender, Nondistended; Bowel sounds present  EXTREMITIES:  2+ Peripheral Pulses, No clubbing, cyanosis, or edema  LYMPH: No lymphadenopathy noted  SKIN: No rashes or lesions    Consultant(s) Notes Reviewed:  [x ] YES  [ ] NO  Care Discussed with Consultants/Other Providers [ x] YES  [ ] NO    LABS:                        16.0   7.81  )-----------( 165      ( 22 May 2019 08:04 )             46.5     -    135  |  101  |  13  ----------------------------<  102<H>  3.8   |  22  |  0.88    Ca    9.3      22 May 2019 06:07      PT/INR - ( 21 May 2019 09:16 )   PT: 11.7 sec;   INR: 1.03 ratio         PTT - ( 21 May 2019 09:16 )  PTT:35.8 sec  Urinalysis Basic - ( 22 May 2019 08:18 )    Color: Yellow / Appearance: Clear / S.021 / pH: x  Gluc: x / Ketone: Moderate  / Bili: Negative / Urobili: 2 mg/dL   Blood: x / Protein: Trace / Nitrite: Negative   Leuk Esterase: Negative / RBC: 5 /hpf / WBC 3 /HPF   Sq Epi: x / Non Sq Epi: 6 /hpf / Bacteria: Negative      CAPILLARY BLOOD GLUCOSE            Urinalysis Basic - ( 22 May 2019 08:18 )    Color: Yellow / Appearance: Clear / S.021 / pH: x  Gluc: x / Ketone: Moderate  / Bili: Negative / Urobili: 2 mg/dL   Blood: x / Protein: Trace / Nitrite: Negative   Leuk Esterase: Negative / RBC: 5 /hpf / WBC 3 /HPF   Sq Epi: x / Non Sq Epi: 6 /hpf / Bacteria: Negative        RADIOLOGY & ADDITIONAL TESTS:    Imaging Personally Reviewed:  [x ] YES  [ ] NO

## 2019-05-22 NOTE — PROGRESS NOTE ADULT - SUBJECTIVE AND OBJECTIVE BOX
THE PATIENT WAS SEEN AND EXAMINED BY ME WITH THE HOUSESTAFF AND STROKE TEAM DURING MORNING ROUNDS.   HPI:  79 year Croatian Speaking man with PMH of 5 ICH - first ICH 8 years ago, known history of amyloid angiopathy was BIB family for evaluation of difficulty ambulation with LE weakness. CT head showed right posterior frontal parasaggital area.   At baseline, patient has limited mobility, needs assistance by another person for moving around, needs assistance with feeding, non-verbal at baseline, difficulty with following commands. ICH score is 0 and NIHSS is 8.     SUBJECTIVE: No events overnight.  No new neurologic complaints.      hydrALAZINE Injectable 5 milliGRAM(s) IV Push every 6 hours  metoprolol tartrate Injectable 5 milliGRAM(s) IV Push every 6 hours PRN  sodium chloride 0.9%. 1000 milliLiter(s) IV Continuous <Continuous>      PHYSICAL EXAM:   Vital Signs Last 24 Hrs  T(C): 36.6 (22 May 2019 08:27), Max: 37.1 (21 May 2019 12:50)  T(F): 97.9 (22 May 2019 08:27), Max: 98.7 (21 May 2019 12:50)  HR: 95 (22 May 2019 08:27) (86 - 110)  BP: 144/88 (22 May 2019 08:27) (135/89 - 162/85)  BP(mean): --  RR: 18 (22 May 2019 08:27) (18 - 18)  SpO2: 95% (22 May 2019 08:27) (95% - 97%)    General: No acute distress  HEENT: attentive to persons on right, VF appear full   Abdomen: Soft, nontender, nondistended   Extremities: No edema    NEUROLOGICAL EXAM:  Mental status: Awake, alert, no verbal output, not following commands, generates sounds at times   Cranial Nerves: No facial asymmetry, no nystagmus,  dysarthria,  tongue midline  Motor exam: upper extremities move well against gravity b/l with mild drift b/l, left lower extremity trace flexion , RLE trace flexion against gravity at knee   Sensation: Intact to light touch   Coordination/ Gait: gait not assessed     LABS:                        16.0   7.81  )-----------( 165      ( 22 May 2019 08:04 )             46.5    05-22    135  |  101  |  13  ----------------------------<  102<H>  3.8   |  22  |  0.88    Ca    9.3      22 May 2019 06:07    TPro  7.3  /  Alb  4.1  /  TBili  0.6  /  DBili  x   /  AST  50<H>  /  ALT  21  /  AlkPhos  81  05-20  PT/INR - ( 21 May 2019 09:16 )   PT: 11.7 sec;   INR: 1.03 ratio         PTT - ( 21 May 2019 09:16 )  PTT:35.8 sec  Hemoglobin A1C, Whole Blood: 5.0 % (05-22 @ 08:04)      IMAGING: Reviewed by me.     CT Head No Cont 05.21.19  No significant change when allowing for differences in   technique.     CT Head No Cont 05.20.19  New small amount of hemorrhage in the right posterior   frontal parasagittal region which may be parenchymal or subarachnoid.   Left frontal temporal encephalomalacia and gliosis related to previous   hemorrhage. Atrophy.

## 2019-05-22 NOTE — SWALLOW VFSS/MBS ASSESSMENT ADULT - INADEQUATE HYOLARYNGEAL ELEVATION
Fluoro initiated mid swallow and anterior hyoid excursion appears to be reduced reduced hyoid excursion

## 2019-05-22 NOTE — SWALLOW VFSS/MBS ASSESSMENT ADULT - ROSENBEK'S PENETRATION ASPIRATION SCALE
unable to objectively r/o laryngeal penetration/aspiration (1) no aspiration, contrast does not enter airway/however, given severe bolus holding/pharyngeal stasis pt remains at high risk for aspiration

## 2019-05-22 NOTE — SWALLOW VFSS/MBS ASSESSMENT ADULT - SLP PERTINENT HISTORY OF CURRENT PROBLEM
79 year Kyrgyz Speaking man with PMH of 5 ICH - first ICH 8 years ago, known history of amyloid angiopathy was BIB family for evaluation of difficulty ambulation. CT head showed right posterior frontal parasaggital area. At baseline, patient has limited mobility, needs assistance by another person for moving around, needs assistance with feeding, non-verbal at baseline, difficulty with following commands. ICH score is 0 and NIHSS is 8. Neurological exam showed difficulty with following commands, difficulty with naming, minimal movmemet in left lower extremity and drift in right lower extremity. Impression: Right posterior parasaggital area hemorrhage secondary to amyloid angiopathy.

## 2019-05-23 ENCOUNTER — TRANSCRIPTION ENCOUNTER (OUTPATIENT)
Age: 79
End: 2019-05-23

## 2019-05-23 VITALS
OXYGEN SATURATION: 95 % | SYSTOLIC BLOOD PRESSURE: 126 MMHG | DIASTOLIC BLOOD PRESSURE: 78 MMHG | HEART RATE: 100 BPM | TEMPERATURE: 97 F | RESPIRATION RATE: 18 BRPM

## 2019-05-23 DIAGNOSIS — R13.10 DYSPHAGIA, UNSPECIFIED: ICD-10-CM

## 2019-05-23 LAB
CULTURE RESULTS: NO GROWTH — SIGNIFICANT CHANGE UP
SPECIMEN SOURCE: SIGNIFICANT CHANGE UP

## 2019-05-23 PROCEDURE — 74230 X-RAY XM SWLNG FUNCJ C+: CPT

## 2019-05-23 PROCEDURE — 80061 LIPID PANEL: CPT

## 2019-05-23 PROCEDURE — 92611 MOTION FLUOROSCOPY/SWALLOW: CPT

## 2019-05-23 PROCEDURE — 97530 THERAPEUTIC ACTIVITIES: CPT

## 2019-05-23 PROCEDURE — 71045 X-RAY EXAM CHEST 1 VIEW: CPT

## 2019-05-23 PROCEDURE — 97162 PT EVAL MOD COMPLEX 30 MIN: CPT

## 2019-05-23 PROCEDURE — 80053 COMPREHEN METABOLIC PANEL: CPT

## 2019-05-23 PROCEDURE — 96374 THER/PROPH/DIAG INJ IV PUSH: CPT

## 2019-05-23 PROCEDURE — 99285 EMERGENCY DEPT VISIT HI MDM: CPT | Mod: 25

## 2019-05-23 PROCEDURE — 97166 OT EVAL MOD COMPLEX 45 MIN: CPT

## 2019-05-23 PROCEDURE — 85027 COMPLETE CBC AUTOMATED: CPT

## 2019-05-23 PROCEDURE — 85730 THROMBOPLASTIN TIME PARTIAL: CPT

## 2019-05-23 PROCEDURE — 83036 HEMOGLOBIN GLYCOSYLATED A1C: CPT

## 2019-05-23 PROCEDURE — 97116 GAIT TRAINING THERAPY: CPT

## 2019-05-23 PROCEDURE — 70450 CT HEAD/BRAIN W/O DYE: CPT

## 2019-05-23 PROCEDURE — 99233 SBSQ HOSP IP/OBS HIGH 50: CPT | Mod: 25

## 2019-05-23 PROCEDURE — 80048 BASIC METABOLIC PNL TOTAL CA: CPT

## 2019-05-23 PROCEDURE — 84443 ASSAY THYROID STIM HORMONE: CPT

## 2019-05-23 PROCEDURE — 92610 EVALUATE SWALLOWING FUNCTION: CPT

## 2019-05-23 PROCEDURE — 82962 GLUCOSE BLOOD TEST: CPT

## 2019-05-23 PROCEDURE — 85610 PROTHROMBIN TIME: CPT

## 2019-05-23 PROCEDURE — 87086 URINE CULTURE/COLONY COUNT: CPT

## 2019-05-23 PROCEDURE — 93005 ELECTROCARDIOGRAM TRACING: CPT

## 2019-05-23 PROCEDURE — 81001 URINALYSIS AUTO W/SCOPE: CPT

## 2019-05-23 RX ORDER — MEMANTINE HYDROCHLORIDE 10 MG/1
10 TABLET ORAL DAILY
Refills: 0 | Status: DISCONTINUED | OUTPATIENT
Start: 2019-05-23 | End: 2019-05-23

## 2019-05-23 RX ADMIN — Medication 1 APPLICATION(S): at 13:00

## 2019-05-23 RX ADMIN — SODIUM CHLORIDE 50 MILLILITER(S): 9 INJECTION INTRAMUSCULAR; INTRAVENOUS; SUBCUTANEOUS at 13:00

## 2019-05-23 RX ADMIN — Medication 5 MILLIGRAM(S): at 05:31

## 2019-05-23 RX ADMIN — Medication 5 MILLIGRAM(S): at 00:06

## 2019-05-23 NOTE — DISCHARGE NOTE PROVIDER - NSDCCPCAREPLAN_GEN_ALL_CORE_FT
PRINCIPAL DISCHARGE DIAGNOSIS  Diagnosis: Intraparenchymal hemorrhage of brain  Assessment and Plan of Treatment: Please follow up with neurologist. Continue taking medications as prescribed. Monitor your blood pressure. Reduce fat, cholesterol and salt in your diet. Increase intake of fruits and vegetables. Limit alcohol to minimum and do not smoke. You may be at risk for falling, make changes to your home to help you walk easier. Keep up to date on vaccinations.  If you experience any symptoms of facial drooping, slurred speech, arm or leg weakness, severe headache, vision changes or any worsening symptoms, notify provider immediatley and return to ER.        SECONDARY DISCHARGE DIAGNOSES  Diagnosis: Advanced care planning/counseling discussion  Assessment and Plan of Treatment: PRINCIPAL DISCHARGE DIAGNOSIS  Diagnosis: Intraparenchymal hemorrhage of brain  Assessment and Plan of Treatment: Please follow up with neurologist. Continue taking medications as prescribed. Monitor your blood pressure. Reduce fat, cholesterol and salt in your diet. Increase intake of fruits and vegetables. Limit alcohol to minimum and do not smoke. You may be at risk for falling, make changes to your home to help you walk easier. Keep up to date on vaccinations.  If you experience any symptoms of facial drooping, slurred speech, arm or leg weakness, severe headache, vision changes or any worsening symptoms, notify provider immediatley and return to ER.        SECONDARY DISCHARGE DIAGNOSES  Diagnosis: Advanced care planning/counseling discussion  Assessment and Plan of Treatment: patient discharged on dysphagia 1 diet and family aware and accepts the possible risk of aspiration

## 2019-05-23 NOTE — PROGRESS NOTE ADULT - ASSESSMENT
79 year Turkmen Speaking man with PMH of 5 ICH - first ICH 8 years ago, known history of amyloid angiopathy was BIB family for evaluation of difficulty ambulation. CT head showed right posterior frontal parasaggital area.   ICH score is 0 and NIHSS is 8.     Impression   Right posterior parasaggital area hemorrhage secondary to cerebral amyloid angiopathy     NEURO: continue monitoring for neurologic deterioration in setting of cerebral edema with mass effect and brain compression, maintain, home statin if applicable, MR imaging note indicated as it will not  at this time. Physical therapy/OT : [facility].     ANTITHROMBOTIC THERAPY: none indicated, would avoid in setting of recurrent CAA as increased risk of hemorrhage     PULMONARY: CXR clear, protecting airway, saturating well     CARDIOVASCULAR:  cardiac monitoring with no acute findings                              SBP goal: < 140mmhg    GASTROINTESTINAL:  dysphagia screen  failed, SLP eval for dysphagia diet with MBS ; Patient recommended for NPO, d/w family re: maintaining NPO for now or until clear goals of care established. Per family the son who is HCP is in [place] and will be present for phone conference re: plan of care on 5/23.  Discussed risks associated if fed, family will d/w HCP and inform of decision.  Verbalized/exprsesed full understanding.      Diet: D1 Nectar for comfort    RENAL: BUN/Cr previously within range, maintain adequate hydration, good urine output      Na Goal: Greater than 135     Ortiz: n    HEMATOLOGY: no signs of bleeding     DVT ppx: initiate LMWH for dvt ppx     ID: afebrile, no si/sx of infection     OTHER: current medical condition and plan of care d/w patient's family previously re: current conditio nand plan of care, in agreement with palliative care eval for further discussion.     DISPOSITION: [facility]     CORE MEASURES:        Admission NIHSS: 8     TPA: [] YES [x] NO      LDL/HDL: p     Depression Screen: na- unable to participate      Statin Therapy: n unless on at home      Dysphagia Screen: [] PASS [x] FAIL     Smoking [] YES [x] NO      Afib [] YES [x] NO     Stroke Education [x] YES [] NO    Obtain screening lower extremity venous ultrasound in patients who meet 1 or more of the following criteria as patient is high risk for DVT/PE on admission:   [] History of DVT/PE  []Hypercoagulable states (Factor V Leiden, Cancer, OCP, etc. )  []Prolonged immobility (hemiplegia/hemiparesis/post operative or any other extended immobilization)  [] Transferred from outside facility (Rehab or Long term care)  [] Age </= to 50 79 year Moh[place] Speaking man with PMH of 5 ICH - first ICH 8 years ago, known history of amyloid angiopathy was BIB family for evaluation of difficulty ambulation. CT head showed right posterior frontal parasaggital area.   ICH score is 0 and NIHSS is 8.     Impression   Right posterior parasaggital area hemorrhage secondary to cerebral amyloid angiopathy     NEURO: Neurologically without acute change, continue monitoring for neurologic deterioration in setting of cerebral edema with mass effect and brain compression, maintain, home statin if applicable, MR imaging note indicated as it will not  at this time. Physical therapy/OT : [facility].     ANTITHROMBOTIC THERAPY: none indicated, would avoid in setting of recurrent cerebral amyloid angiopathy as increased risk of hemorrhage     PULMONARY: CXR clear, protecting airway, saturating well     CARDIOVASCULAR:  cardiac monitoring with no acute findings                              SBP goal: < 140mmhg    GASTROINTESTINAL:  dysphagia screen  failed, SLP eval for dysphagia diet with MBS ; Patient recommended for NPO. Discussion with family, the son who is HCP is in [place] and will be present for phone conference re: plan of care on 5/23.  Discussed risks associated if fed, and willing to provide comfort feedings with known risk of aspiration. Verbalized/exprsesed full understanding.      Diet: D1 Nectar for comfort    RENAL: BUN/Cr previously within range, maintain adequate hydration, good urine output      Na Goal: Greater than 135     Ortiz: n    HEMATOLOGY: no signs of bleeding     DVT ppx: initiate LMWH for dvt ppx     ID: afebrile, no si/sx of infection     OTHER: current medical condition and plan of care d/w patient's family previously re: current conditio nand plan of care, in agreement with palliative care eval for further discussion.     DISPOSITION: [facility]     CORE MEASURES:        Admission NIHSS: 8     TPA: [] YES [x] NO      LDL/HDL: 163/44     Depression Screen: na- unable to participate      Statin Therapy: n unless on at home      Dysphagia Screen: [] PASS [x] FAIL     Smoking [] YES [x] NO      Afib [] YES [x] NO     Stroke Education [x] YES [] NO    Obtain screening lower extremity venous ultrasound in patients who meet 1 or more of the following criteria as patient is high risk for DVT/PE on admission:   [] History of DVT/PE  []Hypercoagulable states (Factor V Leiden, Cancer, OCP, etc. )  []Prolonged immobility (hemiplegia/hemiparesis/post operative or any other extended immobilization)  [] Transferred from outside facility (Rehab or Long term care)  [] Age </= to 50 79 year Bel[place]ian Speaking man with PMH of 5 ICH - first ICH 8 years ago, known history of amyloid angiopathy was BIB family for evaluation of difficulty ambulation. CT head showed right posterior frontal parasaggital area.   ICH score is 0 and NIHSS is 8.     Impression   Right posterior parasaggital area hemorrhage secondary to cerebral amyloid angiopathy     NEURO: Neurologically without acute change, continue monitoring for neurologic deterioration in setting of cerebral edema with mass effect and brain compression, maintain, - family refused statin as patient is on comfort feedings, MR imaging note indicated as it will not  at this time. Physical therapy/OT : [facility].     ANTITHROMBOTIC THERAPY: none indicated, would avoid in setting of recurrent cerebral amyloid angiopathy as increased risk of hemorrhage     PULMONARY: CXR clear, protecting airway, saturating well     CARDIOVASCULAR:  cardiac monitoring with no acute findings                              SBP goal: < 140mmhg    GASTROINTESTINAL:  dysphagia screen  failed, SLP eval for dysphagia diet with MBS ; Patient recommended for NPO. Discussion with family, the son who is HCP is in [place] and will be present for phone conference re: plan of care on 5/23.  Discussed risks associated if fed, and willing to provide comfort feedings with known risk of aspiration. Verbalized/exprsesed full understanding.      Diet: D1 Nectar for comfort    RENAL: BUN/Cr previously within range, maintain adequate hydration, good urine output      Na Goal: Greater than 135     Ortiz: n    HEMATOLOGY: no signs of bleeding     DVT ppx: initiate LMWH for dvt ppx     ID: afebrile, no si/sx of infection     OTHER: current medical condition and plan of care d/w patient's family previously re: current conditio nand plan of care, in agreement with palliative care eval for further discussion.     DISPOSITION: [facility]     CORE MEASURES:        Admission NIHSS: 8     TPA: [] YES [x] NO      LDL/HDL: 163/44     Depression Screen: na- unable to participate      Statin Therapy: n unless on at home      Dysphagia Screen: [] PASS [x] FAIL     Smoking [] YES [x] NO      Afib [] YES [x] NO     Stroke Education [x] YES [] NO    Obtain screening lower extremity venous ultrasound in patients who meet 1 or more of the following criteria as patient is high risk for DVT/PE on admission:   [] History of DVT/PE  []Hypercoagulable states (Factor V Leiden, Cancer, OCP, etc. )  []Prolonged immobility (hemiplegia/hemiparesis/post operative or any other extended immobilization)  [] Transferred from outside facility (Rehab or Long term care)  [] Age </= to 50

## 2019-05-23 NOTE — PROGRESS NOTE ADULT - ASSESSMENT
79 year Mongolian Speaking man with PMH of 5 ICH - first ICH 8 years ago, known history of amyloid angiopathy. CT head showed right posterior frontal parasaggital area. At baseline, patient has limited mobility requiring full supportive care.  Palliative care called for GOC.

## 2019-05-23 NOTE — DISCHARGE NOTE PROVIDER - CARE PROVIDER_API CALL
John Jewell (DO)  Neurology; Vascular Neurology  [address] [facility] [address]  [place] [address]  Phone: [phone]  Fax: [phone]  Follow Up Time: 1 month

## 2019-05-23 NOTE — PROGRESS NOTE ADULT - PROBLEM SELECTOR PLAN 4
family requesting pleasure feeds  reviewed risk of aspiration of food and secretions including but not limited to the reoccurence of asp PNA.  They verbalize understanding and accept the risk as the pt loves to eat and this would be his wish that they are upholding.

## 2019-05-23 NOTE — PROGRESS NOTE ADULT - SUBJECTIVE AND OBJECTIVE BOX
HPI:  79 year Portuguese Speaking man with PMH of 5 ICH - first ICH 8 years ago, known history of amyloid angiopathy was BIB family for evaluation of difficulty ambulation. CT head showed right posterior frontal parasaggital area.   At baseline, patient has limited mobility, needs assistance by another person for moving around, needs assistance with feeding, non-verbal at baseline, difficulty with following commands. ICH score is 0 and NIHSS is 8. (20 May 2019 12:55)    PERTINENT PM/SXH:   CVA (cerebral infarction)  Amyloid disease    No significant past surgical history    FAMILY HISTORY:  No pertinent family history in first degree relatives    ITEMS NOT CHECKED ARE NOT PRESENT    SOCIAL HISTORY:   Significant other/partner: spouse [x ]  Children: 2  sons  [ ]  Faith/Spirituality:  Substance hx:  [ ]   Tobacco hx:  [ ]   Alcohol hx: [ ]   Home Opioid hx:  [ ] I-Stop Reference No:  Living Situation: x ]Home  [ ]Long term care  [ ]Rehab [ ]Other    ADVANCE DIRECTIVES:    DNR  yes  MOLST  [x ]  yes 20  Living Will  [ ]   DECISION MAKER(s):  [x ] Health Care Proxy(s)  [ ] Surrogate(s)  [ ] Guardian           Name(s): Phone Number(s): Geovanni Gtz (he is in [place].  Fm today he defers decision making to his brother Don while he is out of the country)    BASELINE (I)ADL(s) (prior to admission):  [place]: [ ]Total  [ ] Moderate [x ]Dependent    Allergies  No Known Allergies    Intolerances    MEDICATIONS  (STANDING):  hydrALAZINE Injectable 5 milliGRAM(s) IV Push every 6 hours  sodium chloride 0.9%. 1000 milliLiter(s) (50 mL/Hr) IV Continuous <Continuous>    MEDICATIONS  (PRN):  metoprolol tartrate Injectable 5 milliGRAM(s) IV Push every 6 hours PRN BP > 140/90    PRESENT SYMPTOMS: [ ]Unable to obtain due to poor mentation   Source if other than patient:  [ ]Family   [ ]Team     Pain (Impact on QOL):    Location -         Minimal acceptable level (0-10 scale):                    Aggravating factors -  Quality -  Radiation -  Severity (0-10 scale) -    Timing -    PAIN AD Score:     [email or web address]/painad.pdf (press ctrl +  left click to view)    Dyspnea:                           [ ]Mild [ ]Moderate [ ]Severe  Anxiety:                             [ ]Mild [ ]Moderate [ ]Severe  Fatigue:                             [x ]Mild [ ]Moderate [ ]Severe  Nausea:                            [ ]Mild [ ]Moderate [ ]Severe  Loss of appetite:               [ ]Mild [ ]Moderate [ ]Severe  Constipation:                    [ ]Mild [ ]Moderate [ ]Severe    Other Symptoms:  [ ]All other review of systems negative     Karnofsky Performance Score/Palliative Performance Status Version 2:   20-30   %    http://palliative.info/resource_material/[email or web address].pdf    PHYSICAL EXAM:  Vital Signs Last 24 Hrs  T(C): 37.1 (23 May 2019 08:17), Max: 37.1 (22 May 2019 12:37)  T(F): 98.8 (23 May 2019 08:17), Max: 98.8 (23 May 2019 08:17)  HR: 105 (23 May 2019 08:17) (80 - 116)  BP: 128/83 (23 May 2019 08:17) (127/79 - 176/79)  BP(mean): --  RR: 18 (23 May 2019 08:17) (18 - 18)  SpO2: 95% (23 May 2019 08:17) (95% - 99%)    GENERAL:  [x ]Alert  [ ]Oriented x   [x ]Lethargic  [x ]Cachexia  [ ]Unarousable  [ ]Verbal  [x ]Non-Verbal    Behavioral:   [ ] Anxiety  [x ] Delirium [ ] Agitation [ ] Other    HEENT:  [ ]Normal   [x ]Dry mouth   [ ]ET Tube/Trach  [ ]Oral lesions    PULMONARY:   [x ]Clear [ ]Tachypnea  [ ]Audible excessive secretions   [ ]Rhonchi        [ ]Right [ ]Left [ ]Bilateral  [ ]Crackles        [ ]Right [ ]Left [ ]Bilateral  [ ]Wheezing     [ ]Right [ ]Left [ ]Bilateral    CARDIOVASCULAR:    [ ]Regular [ ]Irregular [x ]Tachy  [ ]Cruzito [ ]Murmur [ ]Other    GASTROINTESTINAL:  [ x]Soft  [x ]Distended   [x ]+BS  [ ]Non tender [ ]Tender  [ ]PEG [ ]OGT/ NGT  Last BM:     GENITOURINARY:  [ ]Normal [x ] Incontinent   [ ]Oliguria/Anuria   [ ]Ortiz    MUSCULOSKELETAL:   [ ]Normal   [x ]Weakness  [x ]Bed/Wheelchair bound [ ]Edema    NEUROLOGIC:   [ ]No focal deficits  [x ] Cognitive impairment  [ ] Dysphagia [ ]Dysarthria [x ] R patti Paresis [ ]Other     SKIN:   [ ]Normal   [ ]Pressure ulcer(s)  [ ]Rash    CRITICAL CARE:  [ ] Shock Present  [ ] Septic [ ]Cardiogenic [ ]Neurologic [ ]Hypovolemic  [ ]  Vasopressors [ ]  Inotropes   [ ] Respiratory failure present  [ ] Acute  [ ] Chronic [ ] Hypoxic  [ ] Hypercarbic [ ] Other  [ ] Other organ failure     LABS:                        16.0   7.81  )-----------( 165      ( 22 May 2019 08:04 )             46.5       135  |  101  |  13  ----------------------------<  102<H>  3.8   |  22  |  0.88    Ca    9.3      22 May 2019 06:07    TPro  7.3  /  Alb  4.1  /  TBili  0.6  /  DBili  x   /  AST  50<H>  /  ALT  21  /  AlkPhos  81    PT/INR - ( 21 May 2019 09:16 )   PT: 11.7 sec;   INR: 1.03 ratio         PTT - ( 21 May 2019 09:16 )  PTT:35.8 sec    Urinalysis Basic - ( 22 May 2019 08:18 )    Color: Yellow / Appearance: Clear / S.021 / pH: x  Gluc: x / Ketone: Moderate  / Bili: Negative / Urobili: 2 mg/dL   Blood: x / Protein: Trace / Nitrite: Negative   Leuk Esterase: Negative / RBC: 5 /hpf / WBC 3 /HPF   Sq Epi: x / Non Sq Epi: 6 /hpf / Bacteria: Negative      RADIOLOGY & ADDITIONAL STUDIES:    PROTEIN CALORIE MALNUTRITION PRESENT: [ ] Yes [ ] No  [ ] PPSV2 < or = to 30% [ ] significant weight loss  [ ] poor nutritional intake [ ] catabolic state [ ] anasarca     Albumin, Serum: 4.1 g/dL (19 @ 10:47)  Artificial Nutrition [ ]     REFERRALS:   [ ]Chaplaincy  [ ] Hospice  [ ]Child Life  [ ]Social Work  [ ]Case management [ ]Holistic Therapy   Goals of Care Discussion Document:

## 2019-05-23 NOTE — DISCHARGE NOTE NURSING/CASE MANAGEMENT/SOCIAL WORK - NSDCPEPTSTRK_GEN_ALL_CORE
Stroke education booklet/Call 911 for stroke/Stroke warning signs and symptoms/Signs and symptoms of stroke/Need for follow up after discharge/Prescribed medications/Risk factors for stroke/Stroke support groups for patients, families, and friends

## 2019-05-23 NOTE — PROGRESS NOTE ADULT - SUBJECTIVE AND OBJECTIVE BOX
THE PATIENT WAS SEEN AND EXAMINED BY ME WITH THE HOUSESTAFF AND STROKE TEAM DURING MORNING ROUNDS.   HPI:  79 year Romanian Speaking man with PMH of 5 ICH - first ICH 8 years ago, known history of amyloid angiopathy was BIB family for evaluation of difficulty ambulation with LE weakness. CT head showed right posterior frontal parasaggital area.   At baseline, patient has limited mobility, needs assistance by another person for moving around, needs assistance with feeding, non-verbal at baseline, difficulty with following commands. ICH score is 0 and NIHSS is 8.     SUBJECTIVE: No events overnight.  No new neurologic complaints.      enoxaparin Injectable 40 milliGRAM(s) SubCutaneous every 24 hours  hydrALAZINE Injectable 5 milliGRAM(s) IV Push every 6 hours  hydrocortisone 1% Ointment 1 Application(s) Topical daily  metoprolol tartrate Injectable 5 milliGRAM(s) IV Push every 6 hours PRN  sodium chloride 0.9%. 1000 milliLiter(s) IV Continuous <Continuous>    PHYSICAL EXAM:   Vital Signs Last 24 Hrs  T(C): 36.3 (23 May 2019 05:12), Max: 37.1 (22 May 2019 12:37)  T(F): 97.4 (23 May 2019 05:12), Max: 98.7 (22 May 2019 12:37)  HR: 95 (23 May 2019 05:12) (80 - 116)  BP: 147/95 (23 May 2019 05:12) (127/79 - 176/79)  RR: 18 (23 May 2019 05:12) (18 - 18)  SpO2: 95% (23 May 2019 05:12) (95% - 99%)    General: No acute distress  HEENT: attentive to persons on right, VF appear full   Abdomen: Soft, nontender, nondistended   Extremities: No edema    NEUROLOGICAL EXAM:  Mental status: Awake, alert, no verbal output, not following commands, generates sounds at times   Cranial Nerves: No facial asymmetry, no nystagmus,  dysarthria,  tongue midline  Motor exam: upper extremities move well against gravity b/l with mild drift b/l, left lower extremity trace flexion , RLE trace flexion against gravity at knee   Sensation: Intact to light touch   Coordination/ Gait: gait not assessed     LABS:                        16.0   7.81  )-----------( 165      ( 22 May 2019 08:04 )             46.5    05-22    135  |  101  |  13  ----------------------------<  102<H>  3.8   |  22  |  0.88    Ca    9.3      22 May 2019 06:07    PT/INR - ( 21 May 2019 09:16 )   PT: 11.7 sec;   INR: 1.03 ratio       PTT - ( 21 May 2019 09:16 )  PTT:35.8 sec  Hemoglobin A1C, Whole Blood: 5.0 % (05-22 @ 08:04)    IMAGING: Reviewed by me.     CT Head No Cont 05.21.19  No significant change when allowing for differences in   technique.     CT Head No Cont 05.20.19  New small amount of hemorrhage in the right posterior   frontal parasagittal region which may be parenchymal or subarachnoid.   Left frontal temporal encephalomalacia and gliosis related to previous   hemorrhage. Atrophy. THE PATIENT WAS SEEN AND EXAMINED BY ME WITH THE HOUSESTAFF AND STROKE TEAM DURING MORNING ROUNDS.   HPI:  79 year English Speaking man with PMH of 5 ICH - first ICH 8 years ago, known history of amyloid angiopathy was BIB family for evaluation of difficulty ambulation with LE weakness. CT head showed right posterior frontal parasaggital area.   At baseline, patient has limited mobility, needs assistance by another person for moving around, needs assistance with feeding, non-verbal at baseline, difficulty with following commands. ICH score is 0 and NIHSS is 8.     SUBJECTIVE: No events overnight.  No new neurologic complaints.      enoxaparin Injectable 40 milliGRAM(s) SubCutaneous every 24 hours  hydrALAZINE Injectable 5 milliGRAM(s) IV Push every 6 hours  hydrocortisone 1% Ointment 1 Application(s) Topical daily  metoprolol tartrate Injectable 5 milliGRAM(s) IV Push every 6 hours PRN  sodium chloride 0.9%. 1000 milliLiter(s) IV Continuous <Continuous>    PHYSICAL EXAM:   Vital Signs Last 24 Hrs  T(C): 36.3 (23 May 2019 05:12), Max: 37.1 (22 May 2019 12:37)  T(F): 97.4 (23 May 2019 05:12), Max: 98.7 (22 May 2019 12:37)  HR: 95 (23 May 2019 05:12) (80 - 116)  BP: 147/95 (23 May 2019 05:12) (127/79 - 176/79)  RR: 18 (23 May 2019 05:12) (18 - 18)  SpO2: 95% (23 May 2019 05:12) (95% - 99%)    General: No acute distress  HEENT: attentive to persons on right, VF appear full   Abdomen: Soft, nontender, nondistended   Extremities: No edema    NEUROLOGICAL EXAM:  Mental status: Awake, alert, no verbal output, not following commands, generates sounds at times   Cranial Nerves: No facial asymmetry, no nystagmus, dysarthria, tongue midline  Motor exam: upper extremities move well against gravity b/l with mild drift b/l, left lower extremity trace flexion , RLE trace flexion against gravity at knee   Sensation: Intact to light touch   Coordination/ Gait: gait not assessed     LABS:                        16.0   7.81  )-----------( 165      ( 22 May 2019 08:04 )             46.5    05-22    135  |  101  |  13  ----------------------------<  102<H>  3.8   |  22  |  0.88    Ca    9.3      22 May 2019 06:07    PT/INR - ( 21 May 2019 09:16 )   PT: 11.7 sec;   INR: 1.03 ratio       PTT - ( 21 May 2019 09:16 )  PTT:35.8 sec  Hemoglobin A1C, Whole Blood: 5.0 % (05-22 @ 08:04)    IMAGING: Reviewed by me.     CT Head No Cont 05.21.19  No significant change when allowing for differences in   technique.     CT Head No Cont 05.20.19  New small amount of hemorrhage in the right posterior   frontal parasagittal region which may be parenchymal or subarachnoid.   Left frontal temporal encephalomalacia and gliosis related to previous   hemorrhage. Atrophy.

## 2019-05-23 NOTE — PROGRESS NOTE ADULT - SUBJECTIVE AND OBJECTIVE BOX
Patient is a 79y old  Male who presents with a chief complaint of ICH (23 May 2019 10:25)      INTERVAL HPI/OVERNIGHT EVENTS: noted, doing well, stable      Vital Signs Last 24 Hrs  T(C): 36.3 (23 May 2019 15:35), Max: 37.1 (23 May 2019 08:17)  T(F): 97.3 (23 May 2019 15:35), Max: 98.8 (23 May 2019 08:17)  HR: 100 (23 May 2019 15:35) (95 - 110)  BP: 126/78 (23 May 2019 15:35) (123/85 - 147/95)  BP(mean): --  RR: 18 (23 May 2019 15:35) (18 - 18)  SpO2: 95% (23 May 2019 15:35) (95% - 97%)    enoxaparin Injectable 40 milliGRAM(s) SubCutaneous every 24 hours  hydrALAZINE Injectable 5 milliGRAM(s) IV Push every 6 hours  hydrocortisone 1% Ointment 1 Application(s) Topical daily  memantine 10 milliGRAM(s) Oral daily  metoprolol tartrate Injectable 5 milliGRAM(s) IV Push every 6 hours PRN  sodium chloride 0.9%. 1000 milliLiter(s) IV Continuous <Continuous>      PHYSICAL EXAM:  GENERAL: NAD, non verbal  EYES: conjunctiva and sclera clear  ENMT: Moist mucous membranes  NECK: Supple, No JVD, Normal thyroid  NERVOUS SYSTEM:  Alert & Oriented X0   CHEST/LUNG: Clear to auscultation bilaterally; No rales, rhonchi, wheezing, or rubs  HEART: Regular rate and rhythm; No murmurs, rubs, or gallops  ABDOMEN: Soft, Nontender, Nondistended; Bowel sounds present  EXTREMITIES:  2+ Peripheral Pulses, No clubbing, cyanosis, or edema  LYMPH: No lymphadenopathy noted  SKIN: No rashes or lesions    Consultant(s) Notes Reviewed:  [x ] YES  [ ] NO  Care Discussed with Consultants/Other Providers [ x] YES  [ ] NO    LABS:                        16.0   7.81  )-----------( 165      ( 22 May 2019 08:04 )             46.5     05-22    135  |  101  |  13  ----------------------------<  102<H>  3.8   |  22  |  0.88    Ca    9.3      22 May 2019 06:07        Urinalysis Basic - ( 22 May 2019 08:18 )    Color: Yellow / Appearance: Clear / S.021 / pH: x  Gluc: x / Ketone: Moderate  / Bili: Negative / Urobili: 2 mg/dL   Blood: x / Protein: Trace / Nitrite: Negative   Leuk Esterase: Negative / RBC: 5 /hpf / WBC 3 /HPF   Sq Epi: x / Non Sq Epi: 6 /hpf / Bacteria: Negative      CAPILLARY BLOOD GLUCOSE            Urinalysis Basic - ( 22 May 2019 08:18 )    Color: Yellow / Appearance: Clear / S.021 / pH: x  Gluc: x / Ketone: Moderate  / Bili: Negative / Urobili: 2 mg/dL   Blood: x / Protein: Trace / Nitrite: Negative   Leuk Esterase: Negative / RBC: 5 /hpf / WBC 3 /HPF   Sq Epi: x / Non Sq Epi: 6 /hpf / Bacteria: Negative        RADIOLOGY & ADDITIONAL TESTS:    Imaging Personally Reviewed:  [x ] YES  [ ] NO

## 2019-05-23 NOTE — DISCHARGE NOTE PROVIDER - CARE PROVIDERS DIRECT ADDRESSES
,DirectAddress_Unknown
Pt. may benefit from pre-sedation on DOS at the discretion of anesthesiologist.

## 2019-05-23 NOTE — PROGRESS NOTE ADULT - ATTENDING COMMENTS
Ivet Gomez MD   [facility]  
Ivet Gomez MD   [facility]

## 2019-05-23 NOTE — DISCHARGE NOTE NURSING/CASE MANAGEMENT/SOCIAL WORK - NSDCDPATPORTLINK_GEN_ALL_CORE
You can access the [facility] Patient Portal, offered by [facility], by registering with the following website: [email or web address]

## 2019-05-23 NOTE — DISCHARGE NOTE PROVIDER - HOSPITAL COURSE
79 year Y[place] Speaking man with PMH of 5 ICH - first ICH 8 years ago, known history of amyloid angiopathy was brought in by family for evaluation of difficulty ambulation. CT head showed right posterior frontal parasaggital area.         CT Head No Cont 05.21.19    No significant change when allowing for differences in     technique.         CT Head No Cont 05.20.19    New small amount of hemorrhage in the right posterior     frontal parasagittal region which may be parenchymal or subarachnoid.     Left frontal temporal encephalomalacia and gliosis related to previous     hemorrhage. Atrophy.        Impression     Right posterior parasaggital area hemorrhage secondary to cerebral amyloid angiopathy 79 year Taj[place] Speaking man with PMH of 5 ICH - first ICH 8 years ago, known history of amyloid angiopathy was brought in by family for evaluation of difficulty ambulation. CT head showed right posterior frontal parasaggital area.         CT Head No Cont 05.21.19    No significant change when allowing for differences in     technique.         CT Head No Cont 05.20.19    New small amount of hemorrhage in the right posterior     frontal parasagittal region which may be parenchymal or subarachnoid.     Left frontal temporal encephalomalacia and gliosis related to previous     hemorrhage. Atrophy.        Impression     Right posterior parasaggital area hemorrhage secondary to cerebral amyloid angiopathy        Patient's - family refused statin as patient is on comfort feedings and they do not want him on any more medications.        Palliative met at pts bedside with pt, pts son sulaiman, son Geovanni via phone in [place], and pts wife. Discussed hospital course, amyloid and multiple ICH.  Pt has a progressive functional decline, global aphasia, dysphagia and requiring full assist.  Plan is for rehab with transition to LTC +/- hospice. Patient failed MBS and NPO was recommended. Family requesting pleasure feeds, risk of aspiration of food and secretions including but not limited to the reoccurrence of asp PNA reviewed.  They verbalized understanding and accept the risk as the pt loves to eat and this would be his wish that they are upholding.     Patient stable and discharged to rehab.

## 2019-05-23 NOTE — PROGRESS NOTE ADULT - PROBLEM SELECTOR PLAN 5
Met at pts bedside with pt, pts son sulaiman, son Geovanni via phone in [place], and pts wife. Discussed hospital course, amyloid and multiple ICH.  Pt has a progressive functional decline, global aphasia, dysphagia and requiring full assist.  Plan is for rehab with transition to LTC +/- hospice.  greater then 20 mins spent discussing ACP.  MOLST completed.  Pt is now DNR/DNI, no peg with limited use of abx if medically necessary.  Discussed with RN and . Palliative care will sign off for now as goal are established.  Please reconsult as necessary.

## 2019-05-23 NOTE — PROGRESS NOTE ADULT - ASSESSMENT
79 year [place] Speaking man with PMH of 5 ICH - first ICH 8 years ago, known history of amyloid angiopathy was BIB family for evaluation of difficulty ambulation. CT head showed right posterior frontal parasaggital area.       IC hemorrhage secondary to amyloid angiopathy   Neuro team mgmt appreciated  Permissive /90  rpt ct head today-no change  No aspirin and DVT prophylaxis   Telemetry monitoring    #HTN: on iv hydralazine and iv metoprolol     swallow eval: noted-non oral means of nutrition  Palliative care cs re: nutritional goals, ?pleasure feeds    dc planning-[facility] to LTC-hospice transition eventually      will cont to follow    [facility]  [phone]

## 2019-09-16 ENCOUNTER — INPATIENT (INPATIENT)
Facility: HOSPITAL | Age: 79
LOS: 11 days | Discharge: NURSING, REHAB OR LONG-TERM CARE | DRG: 871 | End: 2019-09-28
Attending: FAMILY MEDICINE | Admitting: HOSPITALIST
Payer: MEDICARE

## 2019-09-16 VITALS
OXYGEN SATURATION: 100 % | HEART RATE: 108 BPM | TEMPERATURE: 98 F | SYSTOLIC BLOOD PRESSURE: 142 MMHG | RESPIRATION RATE: 16 BRPM | HEIGHT: 72 IN | DIASTOLIC BLOOD PRESSURE: 88 MMHG | WEIGHT: 145.06 LBS

## 2019-09-16 DIAGNOSIS — E85.9 AMYLOIDOSIS, UNSPECIFIED: ICD-10-CM

## 2019-09-16 DIAGNOSIS — R41.82 ALTERED MENTAL STATUS, UNSPECIFIED: ICD-10-CM

## 2019-09-16 DIAGNOSIS — M79.2 NEURALGIA AND NEURITIS, UNSPECIFIED: ICD-10-CM

## 2019-09-16 DIAGNOSIS — A41.9 SEPSIS, UNSPECIFIED ORGANISM: ICD-10-CM

## 2019-09-16 DIAGNOSIS — N39.0 URINARY TRACT INFECTION, SITE NOT SPECIFIED: ICD-10-CM

## 2019-09-16 DIAGNOSIS — I69.319 UNSPECIFIED SYMPTOMS AND SIGNS INVOLVING COGNITIVE FUNCTIONS FOLLOWING CEREBRAL INFARCTION: ICD-10-CM

## 2019-09-16 DIAGNOSIS — Z29.9 ENCOUNTER FOR PROPHYLACTIC MEASURES, UNSPECIFIED: ICD-10-CM

## 2019-09-16 DIAGNOSIS — F32.9 MAJOR DEPRESSIVE DISORDER, SINGLE EPISODE, UNSPECIFIED: ICD-10-CM

## 2019-09-16 DIAGNOSIS — I10 ESSENTIAL (PRIMARY) HYPERTENSION: ICD-10-CM

## 2019-09-16 DIAGNOSIS — F03.90 UNSPECIFIED DEMENTIA WITHOUT BEHAVIORAL DISTURBANCE: ICD-10-CM

## 2019-09-16 LAB
ALBUMIN SERPL ELPH-MCNC: 3.4 G/DL — SIGNIFICANT CHANGE UP (ref 3.3–5)
ALP SERPL-CCNC: 81 U/L — SIGNIFICANT CHANGE UP (ref 40–120)
ALT FLD-CCNC: 21 U/L — SIGNIFICANT CHANGE UP (ref 12–78)
ANION GAP SERPL CALC-SCNC: 7 MMOL/L — SIGNIFICANT CHANGE UP (ref 5–17)
APPEARANCE UR: ABNORMAL
APTT BLD: 34.8 SEC — SIGNIFICANT CHANGE UP (ref 28.5–37)
AST SERPL-CCNC: 24 U/L — SIGNIFICANT CHANGE UP (ref 15–37)
BASOPHILS # BLD AUTO: 0.02 K/UL — SIGNIFICANT CHANGE UP (ref 0–0.2)
BASOPHILS NFR BLD AUTO: 0.2 % — SIGNIFICANT CHANGE UP (ref 0–2)
BILIRUB SERPL-MCNC: 0.7 MG/DL — SIGNIFICANT CHANGE UP (ref 0.2–1.2)
BILIRUB UR-MCNC: NEGATIVE — SIGNIFICANT CHANGE UP
BUN SERPL-MCNC: 24 MG/DL — HIGH (ref 7–23)
CALCIUM SERPL-MCNC: 9.4 MG/DL — SIGNIFICANT CHANGE UP (ref 8.5–10.1)
CHLORIDE SERPL-SCNC: 103 MMOL/L — SIGNIFICANT CHANGE UP (ref 96–108)
CO2 SERPL-SCNC: 29 MMOL/L — SIGNIFICANT CHANGE UP (ref 22–31)
COLOR SPEC: YELLOW — SIGNIFICANT CHANGE UP
CREAT SERPL-MCNC: 1.2 MG/DL — SIGNIFICANT CHANGE UP (ref 0.5–1.3)
DIFF PNL FLD: ABNORMAL
EOSINOPHIL # BLD AUTO: 0.04 K/UL — SIGNIFICANT CHANGE UP (ref 0–0.5)
EOSINOPHIL NFR BLD AUTO: 0.3 % — SIGNIFICANT CHANGE UP (ref 0–6)
GLUCOSE SERPL-MCNC: 104 MG/DL — HIGH (ref 70–99)
GLUCOSE UR QL: NEGATIVE — SIGNIFICANT CHANGE UP
HCT VFR BLD CALC: 41.8 % — SIGNIFICANT CHANGE UP (ref 39–50)
HGB BLD-MCNC: 13.9 G/DL — SIGNIFICANT CHANGE UP (ref 13–17)
IMM GRANULOCYTES NFR BLD AUTO: 0.8 % — SIGNIFICANT CHANGE UP (ref 0–1.5)
INR BLD: 1.12 RATIO — SIGNIFICANT CHANGE UP (ref 0.88–1.16)
KETONES UR-MCNC: NEGATIVE — SIGNIFICANT CHANGE UP
LACTATE SERPL-SCNC: 1.9 MMOL/L — SIGNIFICANT CHANGE UP (ref 0.7–2)
LEUKOCYTE ESTERASE UR-ACNC: ABNORMAL
LIDOCAIN IGE QN: 95 U/L — SIGNIFICANT CHANGE UP (ref 73–393)
LYMPHOCYTES # BLD AUTO: 1.06 K/UL — SIGNIFICANT CHANGE UP (ref 1–3.3)
LYMPHOCYTES # BLD AUTO: 9.2 % — LOW (ref 13–44)
MCHC RBC-ENTMCNC: 31 PG — SIGNIFICANT CHANGE UP (ref 27–34)
MCHC RBC-ENTMCNC: 33.3 GM/DL — SIGNIFICANT CHANGE UP (ref 32–36)
MCV RBC AUTO: 93.1 FL — SIGNIFICANT CHANGE UP (ref 80–100)
MONOCYTES # BLD AUTO: 0.88 K/UL — SIGNIFICANT CHANGE UP (ref 0–0.9)
MONOCYTES NFR BLD AUTO: 7.6 % — SIGNIFICANT CHANGE UP (ref 2–14)
NEUTROPHILS # BLD AUTO: 9.45 K/UL — HIGH (ref 1.8–7.4)
NEUTROPHILS NFR BLD AUTO: 81.9 % — HIGH (ref 43–77)
NITRITE UR-MCNC: NEGATIVE — SIGNIFICANT CHANGE UP
NRBC # BLD: 0 /100 WBCS — SIGNIFICANT CHANGE UP (ref 0–0)
PH UR: 8 — SIGNIFICANT CHANGE UP (ref 5–8)
PLATELET # BLD AUTO: 205 K/UL — SIGNIFICANT CHANGE UP (ref 150–400)
POTASSIUM SERPL-MCNC: 3.9 MMOL/L — SIGNIFICANT CHANGE UP (ref 3.5–5.3)
POTASSIUM SERPL-SCNC: 3.9 MMOL/L — SIGNIFICANT CHANGE UP (ref 3.5–5.3)
PROCALCITONIN SERPL-MCNC: 0.1 NG/ML — HIGH (ref 0–0.04)
PROT SERPL-MCNC: 8.4 G/DL — HIGH (ref 6–8.3)
PROT UR-MCNC: 75 MG/DL
PROTHROM AB SERPL-ACNC: 12.8 SEC — SIGNIFICANT CHANGE UP (ref 10–12.9)
RBC # BLD: 4.49 M/UL — SIGNIFICANT CHANGE UP (ref 4.2–5.8)
RBC # FLD: 12.8 % — SIGNIFICANT CHANGE UP (ref 10.3–14.5)
SODIUM SERPL-SCNC: 139 MMOL/L — SIGNIFICANT CHANGE UP (ref 135–145)
SP GR SPEC: 1.01 — SIGNIFICANT CHANGE UP (ref 1.01–1.02)
UROBILINOGEN FLD QL: 8
WBC # BLD: 11.54 K/UL — HIGH (ref 3.8–10.5)
WBC # FLD AUTO: 11.54 K/UL — HIGH (ref 3.8–10.5)

## 2019-09-16 PROCEDURE — 70450 CT HEAD/BRAIN W/O DYE: CPT | Mod: 26

## 2019-09-16 PROCEDURE — 99223 1ST HOSP IP/OBS HIGH 75: CPT | Mod: GC

## 2019-09-16 PROCEDURE — 99285 EMERGENCY DEPT VISIT HI MDM: CPT

## 2019-09-16 PROCEDURE — 71045 X-RAY EXAM CHEST 1 VIEW: CPT | Mod: 26

## 2019-09-16 RX ORDER — SODIUM CHLORIDE 9 MG/ML
1950 INJECTION INTRAMUSCULAR; INTRAVENOUS; SUBCUTANEOUS ONCE
Refills: 0 | Status: COMPLETED | OUTPATIENT
Start: 2019-09-16 | End: 2019-09-16

## 2019-09-16 RX ORDER — MEMANTINE HYDROCHLORIDE 10 MG/1
10 TABLET ORAL DAILY
Refills: 0 | Status: DISCONTINUED | OUTPATIENT
Start: 2019-09-16 | End: 2019-09-28

## 2019-09-16 RX ORDER — CHOLECALCIFEROL (VITAMIN D3) 125 MCG
2000 CAPSULE ORAL DAILY
Refills: 0 | Status: DISCONTINUED | OUTPATIENT
Start: 2019-09-16 | End: 2019-09-28

## 2019-09-16 RX ORDER — CEFTRIAXONE 500 MG/1
1000 INJECTION, POWDER, FOR SOLUTION INTRAMUSCULAR; INTRAVENOUS ONCE
Refills: 0 | Status: COMPLETED | OUTPATIENT
Start: 2019-09-16 | End: 2019-09-16

## 2019-09-16 RX ORDER — TRAZODONE HCL 50 MG
50 TABLET ORAL AT BEDTIME
Refills: 0 | Status: DISCONTINUED | OUTPATIENT
Start: 2019-09-16 | End: 2019-09-28

## 2019-09-16 RX ORDER — ENOXAPARIN SODIUM 100 MG/ML
40 INJECTION SUBCUTANEOUS
Qty: 0 | Refills: 0 | DISCHARGE

## 2019-09-16 RX ORDER — SODIUM CHLORIDE 9 MG/ML
3 INJECTION INTRAMUSCULAR; INTRAVENOUS; SUBCUTANEOUS ONCE
Refills: 0 | Status: COMPLETED | OUTPATIENT
Start: 2019-09-16 | End: 2019-09-16

## 2019-09-16 RX ORDER — ENOXAPARIN SODIUM 100 MG/ML
40 INJECTION SUBCUTANEOUS DAILY
Refills: 0 | Status: DISCONTINUED | OUTPATIENT
Start: 2019-09-16 | End: 2019-09-28

## 2019-09-16 RX ORDER — ACETAMINOPHEN 500 MG
650 TABLET ORAL EVERY 6 HOURS
Refills: 0 | Status: DISCONTINUED | OUTPATIENT
Start: 2019-09-16 | End: 2019-09-28

## 2019-09-16 RX ORDER — CEFTRIAXONE 500 MG/1
1000 INJECTION, POWDER, FOR SOLUTION INTRAMUSCULAR; INTRAVENOUS EVERY 24 HOURS
Refills: 0 | Status: DISCONTINUED | OUTPATIENT
Start: 2019-09-17 | End: 2019-09-20

## 2019-09-16 RX ORDER — ACETAMINOPHEN 500 MG
650 TABLET ORAL ONCE
Refills: 0 | Status: COMPLETED | OUTPATIENT
Start: 2019-09-16 | End: 2019-09-16

## 2019-09-16 RX ORDER — HYDROCHLOROTHIAZIDE 25 MG
12.5 TABLET ORAL DAILY
Refills: 0 | Status: DISCONTINUED | OUTPATIENT
Start: 2019-09-16 | End: 2019-09-28

## 2019-09-16 RX ORDER — MAGNESIUM HYDROXIDE 400 MG/1
30 TABLET, CHEWABLE ORAL DAILY
Refills: 0 | Status: DISCONTINUED | OUTPATIENT
Start: 2019-09-16 | End: 2019-09-28

## 2019-09-16 RX ORDER — ASCORBIC ACID 60 MG
500 TABLET,CHEWABLE ORAL DAILY
Refills: 0 | Status: DISCONTINUED | OUTPATIENT
Start: 2019-09-16 | End: 2019-09-28

## 2019-09-16 RX ORDER — SODIUM CHLORIDE 9 MG/ML
1000 INJECTION INTRAMUSCULAR; INTRAVENOUS; SUBCUTANEOUS
Refills: 0 | Status: COMPLETED | OUTPATIENT
Start: 2019-09-16 | End: 2019-09-17

## 2019-09-16 RX ORDER — METOPROLOL TARTRATE 50 MG
12.5 TABLET ORAL
Qty: 0 | Refills: 0 | DISCHARGE

## 2019-09-16 RX ADMIN — Medication 650 MILLIGRAM(S): at 18:35

## 2019-09-16 RX ADMIN — SODIUM CHLORIDE 3 MILLILITER(S): 9 INJECTION INTRAMUSCULAR; INTRAVENOUS; SUBCUTANEOUS at 17:40

## 2019-09-16 RX ADMIN — SODIUM CHLORIDE 1950 MILLILITER(S): 9 INJECTION INTRAMUSCULAR; INTRAVENOUS; SUBCUTANEOUS at 18:14

## 2019-09-16 RX ADMIN — Medication 650 MILLIGRAM(S): at 18:05

## 2019-09-16 RX ADMIN — CEFTRIAXONE 100 MILLIGRAM(S): 500 INJECTION, POWDER, FOR SOLUTION INTRAMUSCULAR; INTRAVENOUS at 20:00

## 2019-09-16 NOTE — H&P ADULT - HISTORY OF PRESENT ILLNESS
80 yo male pmx of hemorrhagic CVA, h/o amyloid angiopathy, dementia, HTN, candidal stomatitis, rosacea, blepharitis, chronic constipation, vitamin D deficiency, insomnia, neuralgia, depression, post herpetic neurologia who presents to the ED with a cc of increasing confusion.   Recently admitted to [facility] from 5/20-5/23 after pt was found to have new small amount of hemorrhage in the right posterior frontal parasagittal region.  No acute intervention was preformed.  He was noted to have a progressive functional decline with global aphasia and dysphagia.  PEG was recommend family refused.  Pt was discharged to NH.  Per wife history pt has been improving during his stay at the NH.  He had progressed to being able to ambulate with a walker and assistance and was able to tolerate soft mechanical soft diet and soon to be advanced to regular per son.  Approx 1 week ago pt had a flare of his trigeminal neuralgia and since then has had a progressive decline.  They reports that he has not been able to ambulate and has not been able to swallow for the last 3-4 days.  These symptoms were similar to his last re-bleed and so they became concerned and came to the ED for further work up.

## 2019-09-16 NOTE — H&P ADULT - NSICDXPASTMEDICALHX_GEN_ALL_CORE_FT
PAST MEDICAL HISTORY:  Amyloid disease     CVA (cerebral infarction) 3 previous hemorrhagic strokes secondary to amyloid disease 1st 5 yrs ago and 2 subsequent most recently on labor day    Dementia

## 2019-09-16 NOTE — H&P ADULT - ASSESSMENT
This is a 80 yo male pmx of hemorrhagic CVA, h/o amyloid angiopathy, dementia, HTN, candidal stomatitis, rosacea, blepharitis, chronic constipation, vitamin D deficiency, insomnia, neuralgia, depression, post herpetic neurologia who presents to the ED with a cc of increasing confusion. Found with + UA and admitted for AMS possibly 2/2 urosepsis

## 2019-09-16 NOTE — H&P ADULT - PROBLEM SELECTOR PLAN 4
2/2 amyloid angiopathy with multiple previous ICH/CVAs causing progressive functional decline with global aphasia and dysphagia, has been improving since at NH, but with recent decline as per hx  - remote tele  - PT/OT

## 2019-09-16 NOTE — H&P ADULT - PROBLEM SELECTOR PLAN 3
Acute likely 2/2 infectious state from uti superimposing baseline cognitive deficits from previous CVAs and dementia  - plan as above  - remote tele

## 2019-09-16 NOTE — ED ADULT NURSE REASSESSMENT NOTE - NS ED NURSE REASSESS COMMENT FT1
patient non-verbal and refuses to open mouth for meds or oral temp.  MD Ramesh notified and aware.  bilateral redness on buttocks, and sacral region

## 2019-09-16 NOTE — H&P ADULT - NSHPPHYSICALEXAM_GEN_ALL_CORE
Vital Signs Last 24 Hrs  T(C): 38.2 (16 Sep 2019 20:04), Max: 39.2 (16 Sep 2019 17:45)  T(F): 100.8 (16 Sep 2019 20:04), Max: 102.6 (16 Sep 2019 17:45)  HR: 111 (16 Sep 2019 20:04) (108 - 111)  BP: 108/70 (16 Sep 2019 20:04) (108/70 - 142/88)  BP(mean): --  RR: 16 (16 Sep 2019 20:04) (16 - 16)  SpO2: 96% (16 Sep 2019 20:04) (96% - 100%)    Physical Exam:  General: Well developed, NAD  HEENT: NCAT, PERRLA, EOMI bl, dry mucous membranes   Neck: Supple, nontender, no mass  Neurology: Nonverbal 2/2 previous ICH/CVA  Respiratory: grossly CTA B/L, No W  CV: RRR, +S1/S2, no murmurs  Abdominal: Soft, NT, ND  Extremities: No C/C/E, + 2 peripheral pulses   Skin: warm, dry, normal color

## 2019-09-16 NOTE — ED PROVIDER NOTE - CLINICAL SUMMARY MEDICAL DECISION MAKING FREE TEXT BOX
Pt is a 78 yo male who presents to the ED with a cc of increasing confusion.  PMHx of hemorrhagic CVA, h/o amyloid angiopathy, dementia, HTN, candidal stomatitis, rosacea, blepharitis, chronic constipation, vitamin D deficiency, insomnia, neuralgia, depression, post herpetic neurologia. Pt is a 80 yo male who presents to the ED with a cc of increasing confusion.  PMHx of hemorrhagic CVA, h/o amyloid angiopathy, dementia, HTN, candidal stomatitis, rosacea, blepharitis, chronic constipation, vitamin D deficiency, insomnia, neuralgia, depression, post herpetic neurologia.  Pt with increased lethargy concern for infection vs re-bleed.  Will obtain screening labs, CT head, chest x-ray, EKG will monitor

## 2019-09-16 NOTE — ED ADULT NURSE NOTE - NSIMPLEMENTINTERV_GEN_ALL_ED
Implemented All Fall with Harm Risk Interventions:  [place] to call system. Call bell, personal items and telephone within reach. Instruct patient to call for assistance. Room bathroom lighting operational. Non-slip footwear when patient is off stretcher. Physically safe environment: no spills, clutter or unnecessary equipment. Stretcher in lowest position, wheels locked, appropriate side rails in place. Provide visual cue, wrist band, yellow gown, etc. Monitor gait and stability. Monitor for mental status changes and reorient to person, place, and time. Review medications for side effects contributing to fall risk. Reinforce activity limits and safety measures with patient and family. Provide visual clues: red socks.

## 2019-09-16 NOTE — H&P ADULT - NSHPSOCIALHISTORY_GEN_ALL_CORE
Dependent with all ADLs including feedings  Previously some ambulation with walker but now non ambulatory

## 2019-09-16 NOTE — H&P ADULT - PROBLEM SELECTOR PLAN 10
IMPROVE VTE Individual Risk Assessment        RISK                                                          Points  [  ] Previous VTE                                                3  [  ] Thrombophilia                                             2  [ x ] Lower limb paralysis                                   2        (unable to hold up >15 seconds)    [  ] Current Cancer                                             2         (within 6 months)  [ x ] Immobilization > 24 hrs                              1  [  ] ICU/CCU stay > 24 hours                             1  [ x ] Age > 60                                                         1  IMPROVE VTE Score: 4  DVT ppx: lovenox  Continue vitamins

## 2019-09-16 NOTE — ED ADULT NURSE NOTE - OBJECTIVE STATEMENT
Pt presents to the Ed via ambulance from [facility], s/[facility], pt is non-verbal. Spoke with the wife who is at the bedside, speaks little english, ( used the visual : Moy # [id number]) Pt is having difficulty swallowing.

## 2019-09-16 NOTE — ED PROVIDER NOTE - OBJECTIVE STATEMENT
Pt is a 78 yo male who presents to the ED with a cc of increasing confusion.  PMHx of hemorrhagic CVA, h/o amyloid angiopathy, dementia, HTN, candidal stomatitis, rosacea, blepharitis, chronic constipation, vitamin D deficiency, insomnia, neuralgia, depression, post herpetic neurologia.   Recently admitted to [facility] from 5/20-5/23 after pt was found to have new small amount of hemorrhage in the right posterior frontal parasagittal region.  No acute intervention was preformed.  He was noted to have a progressive functional decline with global aphasia and dysphagia.  PEG was recommend family refused.  Pt was discharged to NH. Pt is a 78 yo male who presents to the ED with a cc of increasing confusion.  PMHx of hemorrhagic CVA, h/o amyloid angiopathy, dementia, HTN, candidal stomatitis, rosacea, blepharitis, chronic constipation, vitamin D deficiency, insomnia, neuralgia, depression, post herpetic neurologia.   Recently admitted to [facility] from 5/20-5/23 after pt was found to have new small amount of hemorrhage in the right posterior frontal parasagittal region.  No acute intervention was preformed.  He was noted to have a progressive functional decline with global aphasia and dysphagia.  PEG was recommend family refused.  Pt was discharged to NH.  Per wife history pt has been improving during his stay at the NH.  he had progressed to being able to ambulate with a walker and assistance and was able to soft a mechanical soft diet.  Approx 1 week ago pt had a flare of his trigeminal neuralgia and since then has had a progressive decline.  They reports that he has not been able to ambulate and has not been able to swallow for the last 3-4 days.  These symptoms were similar to his last re-bleed and so they became concerned and came to the ED for further work up.    Interrupter : # [id number]

## 2019-09-16 NOTE — ED ADULT NURSE NOTE - PMH
Amyloid disease    CVA (cerebral infarction)  3 previous hemorrhagic strokes secondary to amyloid disease 1st 5 yrs ago and 2 subsequent most recently on labor day  Dementia

## 2019-09-16 NOTE — ED PROVIDER NOTE - PROGRESS NOTE DETAILS
spoke with Dr. Ellington states no longer admits for [facility] requests admission to on call team pt re-assessed appears to be more awake, cap refill less then 2 seconds, pt re-assessed appears to be more awake, cap refill less then 2 seconds, , SPO2 97%, BP pt re-assessed appears to be more awake, cap refill less then 2 seconds, , SPO2 97%, /70 MAP 83

## 2019-09-16 NOTE — ED PROVIDER NOTE - CARE PLAN
Principal Discharge DX:	Sepsis  Secondary Diagnosis:	UTI (urinary tract infection)  Secondary Diagnosis:	Altered mental status

## 2019-09-17 LAB
ALBUMIN SERPL ELPH-MCNC: 2.9 G/DL — LOW (ref 3.3–5)
ALP SERPL-CCNC: 65 U/L — SIGNIFICANT CHANGE UP (ref 40–120)
ALT FLD-CCNC: 16 U/L — SIGNIFICANT CHANGE UP (ref 12–78)
ANION GAP SERPL CALC-SCNC: 7 MMOL/L — SIGNIFICANT CHANGE UP (ref 5–17)
AST SERPL-CCNC: 20 U/L — SIGNIFICANT CHANGE UP (ref 15–37)
BILIRUB SERPL-MCNC: 1 MG/DL — SIGNIFICANT CHANGE UP (ref 0.2–1.2)
BUN SERPL-MCNC: 20 MG/DL — SIGNIFICANT CHANGE UP (ref 7–23)
CALCIUM SERPL-MCNC: 8.9 MG/DL — SIGNIFICANT CHANGE UP (ref 8.5–10.1)
CHLORIDE SERPL-SCNC: 109 MMOL/L — HIGH (ref 96–108)
CO2 SERPL-SCNC: 28 MMOL/L — SIGNIFICANT CHANGE UP (ref 22–31)
CREAT SERPL-MCNC: 1 MG/DL — SIGNIFICANT CHANGE UP (ref 0.5–1.3)
GLUCOSE SERPL-MCNC: 123 MG/DL — HIGH (ref 70–99)
HCT VFR BLD CALC: 39.1 % — SIGNIFICANT CHANGE UP (ref 39–50)
HGB BLD-MCNC: 12.6 G/DL — LOW (ref 13–17)
MCHC RBC-ENTMCNC: 30.4 PG — SIGNIFICANT CHANGE UP (ref 27–34)
MCHC RBC-ENTMCNC: 32.2 GM/DL — SIGNIFICANT CHANGE UP (ref 32–36)
MCV RBC AUTO: 94.2 FL — SIGNIFICANT CHANGE UP (ref 80–100)
NRBC # BLD: 0 /100 WBCS — SIGNIFICANT CHANGE UP (ref 0–0)
PLATELET # BLD AUTO: 172 K/UL — SIGNIFICANT CHANGE UP (ref 150–400)
POTASSIUM SERPL-MCNC: 4.7 MMOL/L — SIGNIFICANT CHANGE UP (ref 3.5–5.3)
POTASSIUM SERPL-SCNC: 4.7 MMOL/L — SIGNIFICANT CHANGE UP (ref 3.5–5.3)
PROT SERPL-MCNC: 7.2 G/DL — SIGNIFICANT CHANGE UP (ref 6–8.3)
RBC # BLD: 4.15 M/UL — LOW (ref 4.2–5.8)
RBC # FLD: 12.7 % — SIGNIFICANT CHANGE UP (ref 10.3–14.5)
SODIUM SERPL-SCNC: 144 MMOL/L — SIGNIFICANT CHANGE UP (ref 135–145)
WBC # BLD: 10.94 K/UL — HIGH (ref 3.8–10.5)
WBC # FLD AUTO: 10.94 K/UL — HIGH (ref 3.8–10.5)

## 2019-09-17 PROCEDURE — 99233 SBSQ HOSP IP/OBS HIGH 50: CPT

## 2019-09-17 PROCEDURE — 93970 EXTREMITY STUDY: CPT | Mod: 26

## 2019-09-17 RX ORDER — INFLUENZA VIRUS VACCINE 15; 15; 15; 15 UG/.5ML; UG/.5ML; UG/.5ML; UG/.5ML
0.5 SUSPENSION INTRAMUSCULAR ONCE
Refills: 0 | Status: COMPLETED | OUTPATIENT
Start: 2019-09-17 | End: 2019-09-28

## 2019-09-17 RX ADMIN — Medication 500 MILLIGRAM(S): at 13:14

## 2019-09-17 RX ADMIN — ENOXAPARIN SODIUM 40 MILLIGRAM(S): 100 INJECTION SUBCUTANEOUS at 12:00

## 2019-09-17 RX ADMIN — Medication 50 MILLIGRAM(S): at 18:25

## 2019-09-17 RX ADMIN — Medication 650 MILLIGRAM(S): at 18:43

## 2019-09-17 RX ADMIN — Medication 650 MILLIGRAM(S): at 19:30

## 2019-09-17 RX ADMIN — Medication 2000 UNIT(S): at 13:14

## 2019-09-17 RX ADMIN — SODIUM CHLORIDE 75 MILLILITER(S): 9 INJECTION INTRAMUSCULAR; INTRAVENOUS; SUBCUTANEOUS at 10:09

## 2019-09-17 RX ADMIN — Medication 50 MILLIGRAM(S): at 21:34

## 2019-09-17 RX ADMIN — MEMANTINE HYDROCHLORIDE 10 MILLIGRAM(S): 10 TABLET ORAL at 13:15

## 2019-09-17 RX ADMIN — CEFTRIAXONE 100 MILLIGRAM(S): 500 INJECTION, POWDER, FOR SOLUTION INTRAMUSCULAR; INTRAVENOUS at 21:34

## 2019-09-17 NOTE — PROGRESS NOTE ADULT - PROBLEM SELECTOR PLAN 4
2/2 amyloid angiopathy with multiple previous ICH/CVAs causing progressive functional decline with global aphasia and dysphagia, has been improving since at NH, but with recent decline as per hx  - remote tele  - PT/OT 2/2 amyloid angiopathy with multiple previous ICH/CVAs causing progressive functional decline with global aphasia and dysphagia, has been improving since at NH, but with recent decline as per hx  - remote tele  - PT/OT  - consider neurology consult

## 2019-09-17 NOTE — OCCUPATIONAL THERAPY INITIAL EVALUATION ADULT - GENERAL OBSERVATIONS, REHAB EVAL
Patient supine in bed with +telemonitor, +IV Left UE and +[place] catheter. Pt non-verbal and has hx CVA and dementia.

## 2019-09-17 NOTE — OCCUPATIONAL THERAPY INITIAL EVALUATION ADULT - RANGE OF MOTION EXAMINATION, UPPER EXTREMITY
bilateral UE Passive ROM was WFL  (within functional limits)/Fine motor skills: impaired due to +dementia

## 2019-09-17 NOTE — ED ADULT NURSE REASSESSMENT NOTE - NS ED NURSE REASSESS COMMENT FT1
patient changed and cleaned, placed on hospital bed.  patient unable/unwilling to cooperate with staff, still will not open mouth.

## 2019-09-17 NOTE — PROGRESS NOTE ADULT - SUBJECTIVE AND OBJECTIVE BOX
INTERVAL HPI/OVERNIGHT EVENTS: Patient seen and examined at bedside. No acute events overnight.     MEDICATIONS  (STANDING):  ascorbic acid 500 milliGRAM(s) Oral daily  cefTRIAXone   IVPB 1000 milliGRAM(s) IV Intermittent every 24 hours  cholecalciferol 2000 Unit(s) Oral daily  enoxaparin Injectable 40 milliGRAM(s) SubCutaneous daily  hydrochlorothiazide 12.5 milliGRAM(s) Oral daily  influenza   Vaccine 0.5 milliLiter(s) IntraMuscular once  memantine 10 milliGRAM(s) Oral daily  pregabalin 50 milliGRAM(s) Oral two times a day  traZODone 50 milliGRAM(s) Oral at bedtime    MEDICATIONS  (PRN):  acetaminophen   Tablet .. 650 milliGRAM(s) Oral every 6 hours PRN Temp greater or equal to 38C (100.4F)  magnesium hydroxide Suspension 30 milliLiter(s) Oral daily PRN Constipation      Allergies    No Known Allergies    Intolerances        CONSTITUTIONAL: No weakness, fevers or chills  RESPIRATORY: No cough, wheezing, hemoptysis; No shortness of breath  Cvs: No chest pain or palpitations  Gi: No abdominal pain. No nausea, vomiting, diarrhea or constipation. No melena or hematochezia.  Neuro: no weakness    All other review of systems is negative unless indicated above.    Vital Signs Last 24 Hrs  T(C): 36.4 (17 Sep 2019 12:00), Max: 39.2 (16 Sep 2019 17:45)  T(F): 97.6 (17 Sep 2019 12:00), Max: 102.6 (16 Sep 2019 17:45)  HR: 103 (17 Sep 2019 12:00) (90 - 111)  BP: 129/78 (17 Sep 2019 12:00) (108/70 - 142/88)  BP(mean): --  RR: 18 (17 Sep 2019 12:00) (16 - 18)  SpO2: 99% (17 Sep 2019 12:00) (96% - 100%)    General: NAD  Neurology: A&Ox , nonfocal  Respiratory: CTA B/L  CV: RRR, S1S2  Abdominal: Soft, NT, ND +BS  Extremities: No edema, + peripheral pulses      LABS:                        12.6   10.94 )-----------( 172      ( 17 Sep 2019 06:31 )             39.1     09-17    144  |  109<H>  |  20  ----------------------------<  123<H>  4.7   |  28  |  1.00    Ca    8.9      17 Sep 2019 06:31    TPro  7.2  /  Alb  2.9<L>  /  TBili  1.0  /  DBili  x   /  AST  20  /  ALT  16  /  AlkPhos  65  09-17    PT/INR - ( 16 Sep 2019 18:07 )   PT: 12.8 sec;   INR: 1.12 ratio         PTT - ( 16 Sep 2019 18:07 )  PTT:34.8 sec  Urinalysis Basic - ( 16 Sep 2019 18:02 )    Color: Yellow / Appearance: Turbid / S.015 / pH: x  Gluc: x / Ketone: Negative  / Bili: Negative / Urobili: 8   Blood: x / Protein: 75 mg/dL / Nitrite: Negative   Leuk Esterase: Moderate / RBC: 0-2 /HPF / WBC 26-50   Sq Epi: x / Non Sq Epi: Occasional / Bacteria: Moderate        RADIOLOGY & ADDITIONAL TESTS: INTERVAL HPI/OVERNIGHT EVENTS: Patient seen and examined at bedside. No acute events overnight. Spoke to son at bedside. Patient is nonverbal at baseline, as per son he appears more like himself now. Updated on concern for UTI.    MEDICATIONS  (STANDING):  ascorbic acid 500 milliGRAM(s) Oral daily  cefTRIAXone   IVPB 1000 milliGRAM(s) IV Intermittent every 24 hours  cholecalciferol 2000 Unit(s) Oral daily  enoxaparin Injectable 40 milliGRAM(s) SubCutaneous daily  hydrochlorothiazide 12.5 milliGRAM(s) Oral daily  influenza   Vaccine 0.5 milliLiter(s) IntraMuscular once  memantine 10 milliGRAM(s) Oral daily  pregabalin 50 milliGRAM(s) Oral two times a day  traZODone 50 milliGRAM(s) Oral at bedtime    MEDICATIONS  (PRN):  acetaminophen   Tablet .. 650 milliGRAM(s) Oral every 6 hours PRN Temp greater or equal to 38C (100.4F)  magnesium hydroxide Suspension 30 milliLiter(s) Oral daily PRN Constipation      Allergies    No Known Allergies    Intolerances    ROS: Unable to obtain patient nonverbal    Vital Signs Last 24 Hrs  T(C): 36.4 (17 Sep 2019 12:00), Max: 39.2 (16 Sep 2019 17:45)  T(F): 97.6 (17 Sep 2019 12:00), Max: 102.6 (16 Sep 2019 17:45)  HR: 103 (17 Sep 2019 12:00) (90 - 111)  BP: 129/78 (17 Sep 2019 12:00) (108/70 - 142/88)  BP(mean): --  RR: 18 (17 Sep 2019 12:00) (16 - 18)  SpO2: 99% (17 Sep 2019 12:00) (96% - 100%)    General: NAD  Neurology: nonverbal   Respiratory: CTA B/L  CV: RRR, S1S2  Abdominal: Soft, NT, ND +BS  Extremities: No edema, + peripheral pulses      LABS:                        12.6   10.94 )-----------( 172      ( 17 Sep 2019 06:31 )             39.1     -    144  |  109<H>  |  20  ----------------------------<  123<H>  4.7   |  28  |  1.00    Ca    8.9      17 Sep 2019 06:31    TPro  7.2  /  Alb  2.9<L>  /  TBili  1.0  /  DBili  x   /  AST  20  /  ALT  16  /  AlkPhos  65  09-17    PT/INR - ( 16 Sep 2019 18:07 )   PT: 12.8 sec;   INR: 1.12 ratio         PTT - ( 16 Sep 2019 18:07 )  PTT:34.8 sec  Urinalysis Basic - ( 16 Sep 2019 18:02 )    Color: Yellow / Appearance: Turbid / S.015 / pH: x  Gluc: x / Ketone: Negative  / Bili: Negative / Urobili: 8   Blood: x / Protein: 75 mg/dL / Nitrite: Negative   Leuk Esterase: Moderate / RBC: 0-2 /HPF / WBC 26-50   Sq Epi: x / Non Sq Epi: Occasional / Bacteria: Moderate        RADIOLOGY & ADDITIONAL TESTS:

## 2019-09-17 NOTE — PROGRESS NOTE ADULT - PROBLEM SELECTOR PLAN 1
Admit to med/surg - 2/2 UTI with fever, tachy, mild leukocytosis. Nonseptic appearing, infection still likely more localized with some systemic response.  - continue Rocephin  - s/p IVF resuscitation, continue gentle IVF until better able to tolerate PO  - tylenol prn for fever  - follow culture data, urine/blood cx drawn  - monitor fever curve  - hemodynamically stable, no present evidence of shock

## 2019-09-17 NOTE — OCCUPATIONAL THERAPY INITIAL EVALUATION ADULT - ADDITIONAL COMMENTS
Pt is non-verbal and unable to provide hx. Social hx obtained from EMR; patient is a LTC resident at [facility]. Pt required assistance with ADL's and transfers at SNF. Pt ambulated using a rolling walker with assistance as per family report prior to hospital admission.

## 2019-09-17 NOTE — OCCUPATIONAL THERAPY INITIAL EVALUATION ADULT - PERTINENT HX OF CURRENT PROBLEM, REHAB EVAL
78 yo male with PMH hemorrhagic CVA, h/o amyloid angiopathy, dementia, HTN, candidal stomatitis, rosacea, blepharitis, chronic constipation, vitamin D deficiency, insomnia, neuralgia, depression, post herpetic neurologia who presented to the ED with c/o increasing confusion. Pt admitted on 9/16/19 for AMS possibly due to urosepsis

## 2019-09-17 NOTE — PROGRESS NOTE ADULT - PROBLEM SELECTOR PLAN 6
Online Visit    Date/Time: 8/21/2018 4:15:12 PM  To: RONNIE ANNE  From: LISETH GALINDO  Subject: TSH   Your TSH is normal. Liseth Klein APN      Verified Results  TSH WITH REFLEX [id number] 10:40AM LISETH GALINDO     Test Name Result Flag Reference   TSH 0.555 mcUnits/mL  0.350-5.000   Findings most consistent with euthyroid state, no additional testing suggested. TSH may be normal in patients with thyroid dysfunction and pituitary disease. Clinical correlation recommended.  (Reflex TSH algorithm is not recommended in hospitalized patients. A variety of drugs, as well as serious acute and chronic illnesses may alter thyroid function tests. Commonly implicated drugs include glucocorticoids, dopamine, carbamazepine, iodine, amiodarone, lithium and heparin.)        Stable - continue Namenda

## 2019-09-18 DIAGNOSIS — G93.41 METABOLIC ENCEPHALOPATHY: ICD-10-CM

## 2019-09-18 DIAGNOSIS — R13.10 DYSPHAGIA, UNSPECIFIED: ICD-10-CM

## 2019-09-18 LAB
-  AMIKACIN: SIGNIFICANT CHANGE UP
-  AMPICILLIN/SULBACTAM: SIGNIFICANT CHANGE UP
-  AMPICILLIN: SIGNIFICANT CHANGE UP
-  AZTREONAM: SIGNIFICANT CHANGE UP
-  CEFAZOLIN: SIGNIFICANT CHANGE UP
-  CEFEPIME: SIGNIFICANT CHANGE UP
-  CEFOXITIN: SIGNIFICANT CHANGE UP
-  CEFTRIAXONE: SIGNIFICANT CHANGE UP
-  CIPROFLOXACIN: SIGNIFICANT CHANGE UP
-  GENTAMICIN: SIGNIFICANT CHANGE UP
-  LEVOFLOXACIN: SIGNIFICANT CHANGE UP
-  MEROPENEM: SIGNIFICANT CHANGE UP
-  NITROFURANTOIN: SIGNIFICANT CHANGE UP
-  PIPERACILLIN/TAZOBACTAM: SIGNIFICANT CHANGE UP
-  TOBRAMYCIN: SIGNIFICANT CHANGE UP
-  TRIMETHOPRIM/SULFAMETHOXAZOLE: SIGNIFICANT CHANGE UP
ANION GAP SERPL CALC-SCNC: 10 MMOL/L — SIGNIFICANT CHANGE UP (ref 5–17)
BUN SERPL-MCNC: 13 MG/DL — SIGNIFICANT CHANGE UP (ref 7–23)
CALCIUM SERPL-MCNC: 8.1 MG/DL — LOW (ref 8.5–10.1)
CHLORIDE SERPL-SCNC: 106 MMOL/L — SIGNIFICANT CHANGE UP (ref 96–108)
CO2 SERPL-SCNC: 25 MMOL/L — SIGNIFICANT CHANGE UP (ref 22–31)
CREAT SERPL-MCNC: 0.75 MG/DL — SIGNIFICANT CHANGE UP (ref 0.5–1.3)
CULTURE RESULTS: SIGNIFICANT CHANGE UP
GLUCOSE SERPL-MCNC: 99 MG/DL — SIGNIFICANT CHANGE UP (ref 70–99)
HCT VFR BLD CALC: 38.7 % — LOW (ref 39–50)
HGB BLD-MCNC: 13 G/DL — SIGNIFICANT CHANGE UP (ref 13–17)
MCHC RBC-ENTMCNC: 30.4 PG — SIGNIFICANT CHANGE UP (ref 27–34)
MCHC RBC-ENTMCNC: 33.6 GM/DL — SIGNIFICANT CHANGE UP (ref 32–36)
MCV RBC AUTO: 90.4 FL — SIGNIFICANT CHANGE UP (ref 80–100)
METHOD TYPE: SIGNIFICANT CHANGE UP
NRBC # BLD: 0 /100 WBCS — SIGNIFICANT CHANGE UP (ref 0–0)
ORGANISM # SPEC MICROSCOPIC CNT: SIGNIFICANT CHANGE UP
ORGANISM # SPEC MICROSCOPIC CNT: SIGNIFICANT CHANGE UP
PLATELET # BLD AUTO: 199 K/UL — SIGNIFICANT CHANGE UP (ref 150–400)
POTASSIUM SERPL-MCNC: 3.1 MMOL/L — LOW (ref 3.5–5.3)
POTASSIUM SERPL-SCNC: 3.1 MMOL/L — LOW (ref 3.5–5.3)
RBC # BLD: 4.28 M/UL — SIGNIFICANT CHANGE UP (ref 4.2–5.8)
RBC # FLD: 12.4 % — SIGNIFICANT CHANGE UP (ref 10.3–14.5)
SODIUM SERPL-SCNC: 141 MMOL/L — SIGNIFICANT CHANGE UP (ref 135–145)
SPECIMEN SOURCE: SIGNIFICANT CHANGE UP
WBC # BLD: 10.43 K/UL — SIGNIFICANT CHANGE UP (ref 3.8–10.5)
WBC # FLD AUTO: 10.43 K/UL — SIGNIFICANT CHANGE UP (ref 3.8–10.5)

## 2019-09-18 PROCEDURE — 99233 SBSQ HOSP IP/OBS HIGH 50: CPT | Mod: GC

## 2019-09-18 RX ORDER — POTASSIUM PHOSPHATE, MONOBASIC POTASSIUM PHOSPHATE, DIBASIC 236; 224 MG/ML; MG/ML
30 INJECTION, SOLUTION INTRAVENOUS ONCE
Refills: 0 | Status: COMPLETED | OUTPATIENT
Start: 2019-09-18 | End: 2019-09-18

## 2019-09-18 RX ORDER — POTASSIUM CHLORIDE 20 MEQ
20 PACKET (EA) ORAL
Refills: 0 | Status: COMPLETED | OUTPATIENT
Start: 2019-09-18 | End: 2019-09-18

## 2019-09-18 RX ORDER — SODIUM CHLORIDE 9 MG/ML
1000 INJECTION INTRAMUSCULAR; INTRAVENOUS; SUBCUTANEOUS
Refills: 0 | Status: DISCONTINUED | OUTPATIENT
Start: 2019-09-18 | End: 2019-09-19

## 2019-09-18 RX ADMIN — Medication 2000 UNIT(S): at 12:18

## 2019-09-18 RX ADMIN — Medication 500 MILLIGRAM(S): at 12:03

## 2019-09-18 RX ADMIN — Medication 20 MILLIEQUIVALENT(S): at 12:02

## 2019-09-18 RX ADMIN — Medication 50 MILLIGRAM(S): at 21:04

## 2019-09-18 RX ADMIN — Medication 20 MILLIEQUIVALENT(S): at 14:45

## 2019-09-18 RX ADMIN — Medication 12.5 MILLIGRAM(S): at 06:42

## 2019-09-18 RX ADMIN — SODIUM CHLORIDE 65 MILLILITER(S): 9 INJECTION INTRAMUSCULAR; INTRAVENOUS; SUBCUTANEOUS at 18:28

## 2019-09-18 RX ADMIN — CEFTRIAXONE 100 MILLIGRAM(S): 500 INJECTION, POWDER, FOR SOLUTION INTRAMUSCULAR; INTRAVENOUS at 19:54

## 2019-09-18 RX ADMIN — Medication 50 MILLIGRAM(S): at 06:42

## 2019-09-18 RX ADMIN — POTASSIUM PHOSPHATE, MONOBASIC POTASSIUM PHOSPHATE, DIBASIC 83.33 MILLIMOLE(S): 236; 224 INJECTION, SOLUTION INTRAVENOUS at 12:03

## 2019-09-18 RX ADMIN — Medication 50 MILLIGRAM(S): at 21:05

## 2019-09-18 RX ADMIN — MEMANTINE HYDROCHLORIDE 10 MILLIGRAM(S): 10 TABLET ORAL at 12:05

## 2019-09-18 RX ADMIN — ENOXAPARIN SODIUM 40 MILLIGRAM(S): 100 INJECTION SUBCUTANEOUS at 12:05

## 2019-09-18 NOTE — SWALLOW BEDSIDE ASSESSMENT ADULT - SWALLOW EVAL: RECOMMENDED DIET
Oral nutrition/hydration is contraindicated at this time. Consider short term non-oral means of nutrition /hydration as patient is at increased nutritional risk. Will defer PO diet to MD at this time pending GOC.

## 2019-09-18 NOTE — PROGRESS NOTE ADULT - ASSESSMENT
This is a 80 yo male pmx of hemorrhagic CVA, h/o amyloid angiopathy, dementia, HTN, candidal stomatitis, rosacea, blepharitis, chronic constipation, vitamin D deficiency, insomnia, neuralgia, depression, post herpetic neurologia who presents to the ED with a cc of increasing confusion. Found with + UA and admitted with acute metabolic encephalopathy 2/2 urosepsis 80 yo male pmx of hemorrhagic CVA, h/o amyloid angiopathy, dementia, HTN, candidal stomatitis, rosacea, blepharitis,chronic constipation, vitamin D deficiency, insomnia, neuralgia, depression, post herpetic neurologia, acute severe protein caloric malnutrition who presents to the ED with a cc of increasing confusion. Found with + UA and admitted with acute metabolic encephalopathy 2/2 urosepsis

## 2019-09-18 NOTE — PROGRESS NOTE ADULT - PROBLEM SELECTOR PLAN 1
Sepsis 2/2 UTI  continue Rocephin   tylenol prn for fevers  urine cx with gram neg rods  f/u blood cx and urine cx sensitivities  leukocytosis resolved

## 2019-09-18 NOTE — PROGRESS NOTE ADULT - PROBLEM SELECTOR PLAN 3
S&S recommended alternative to PO nutrition  will discuss with family  per chart review pt was being given comfort feeds   dysphagia is likely 2/2 past CVAs  keep NPO for now  aspiration precautions  gentle IV hydration while NPO S&S recommended alternative to PO nutrition  mbs ordered as per s/s eval  discussed with family  per chart review pt was being given comfort feeds   dysphagia is likely 2/2 past CVAs and progressive deconditionig due to chronic disease state  keep NPO for now  aspiration precautions  gentle IV hydration while NPO

## 2019-09-18 NOTE — PROGRESS NOTE ADULT - SUBJECTIVE AND OBJECTIVE BOX
Patient is a 79y old  Male who presents with a chief complaint of AMS/uti (17 Sep 2019 16:29)      INTERVAL HPI: Pt seen and examined at bedside, NAD. Nonverbal at baseline, noncompliant with physical exam, doesn't follow commands.      OVERNIGHT EVENTS:  none    T(F): 98 (19 @ 14:40), Max: 101.4 (19 @ 18:38)  HR: 107 (19 @ 14:40) (99 - 111)  BP: 136/83 (19 @ 14:40) (102/59 - 138/91)  RR: 16 (19 @ 14:40) (16 - 19)  SpO2: 99% (19 @ 14:40) (98% - 100%)  I&O's Summary    17 Sep 2019 07:01  -  18 Sep 2019 07:00  --------------------------------------------------------  IN: 225 mL / OUT: 400 mL / NET: -175 mL        REVIEW OF SYSTEMS:  unable to obtain 2/2 mental status       PHYSICAL EXAM:  GENERAL: NAD, frail  HEAD:  Atraumatic, Normocephalic  NERVOUS SYSTEM:  Alert & Oriented X0, Awake, doesn't follow commands  CHEST/LUNG: poor insp effort   HEART: Regular rate and rhythm; No murmurs, rubs, or gallops  ABDOMEN: Soft, Nontender, Nondistended; Bowel sounds present  EXTREMITIES:  2+ Peripheral Pulses, No clubbing, cyanosis, or edema  SKIN: well perfused, no rash noted    LABS:                        13.0   10.43 )-----------( 199      ( 18 Sep 2019 09:06 )             38.7     09-18    141  |  106  |  13  ----------------------------<  99  3.1<L>   |  25  |  0.75    Ca    8.1<L>      18 Sep 2019 09:06  Phos  1.9     -18  Mg     2.1     -    TPro  7.2  /  Alb  2.9<L>  /  TBili  1.0  /  DBili  x   /  AST  20  /  ALT  16  /  AlkPhos  65  09-17    PT/INR - ( 16 Sep 2019 18:07 )   PT: 12.8 sec;   INR: 1.12 ratio         PTT - ( 16 Sep 2019 18:07 )  PTT:34.8 sec  Urinalysis Basic - ( 16 Sep 2019 18:02 )    Color: Yellow / Appearance: Turbid / S.015 / pH: x  Gluc: x / Ketone: Negative  / Bili: Negative / Urobili: 8   Blood: x / Protein: 75 mg/dL / Nitrite: Negative   Leuk Esterase: Moderate / RBC: 0-2 /HPF / WBC 26-50   Sq Epi: x / Non Sq Epi: Occasional / Bacteria: Moderate      CAPILLARY BLOOD GLUCOSE           @ 21:11   No growth to date.  --  --   @ 21:08   No growth to date.  --  --   @ 21:07   >100,000 CFU/ml Gram Negative Rods  --  --          MEDICATIONS  (STANDING):  ascorbic acid 500 milliGRAM(s) Oral daily  cefTRIAXone   IVPB 1000 milliGRAM(s) IV Intermittent every 24 hours  cholecalciferol 2000 Unit(s) Oral daily  enoxaparin Injectable 40 milliGRAM(s) SubCutaneous daily  hydrochlorothiazide 12.5 milliGRAM(s) Oral daily  influenza   Vaccine 0.5 milliLiter(s) IntraMuscular once  memantine 10 milliGRAM(s) Oral daily  potassium chloride    Tablet ER 20 milliEquivalent(s) Oral every 2 hours  pregabalin 50 milliGRAM(s) Oral three times a day  traZODone 50 milliGRAM(s) Oral at bedtime    MEDICATIONS  (PRN):  acetaminophen   Tablet .. 650 milliGRAM(s) Oral every 6 hours PRN Temp greater or equal to 38C (100.4F)  magnesium hydroxide Suspension 30 milliLiter(s) Oral daily PRN Constipation Patient is a 79y old  Male who presents with a chief complaint of AMS/uti (17 Sep 2019 16:29)      INTERVAL HPI: Pt seen and examined at bedside, NAD. Nonverbal at baseline, noncompliant with physical exam, doesn't follow commands.  Son at bedside, confirms he is nonverbal and that pt and family/hcp/caretaker amendable to peg if last resort for nutrition in care planning, pt has had peg in past with success when had prev stroke as per son/hcp/caretaker.     OVERNIGHT EVENTS:  none    T(F): 98 (19 @ 14:40), Max: 101.4 (19 @ 18:38)  HR: 107 (19 @ 14:40) (99 - 111)  BP: 136/83 (19 @ 14:40) (102/59 - 138/91)  RR: 16 (19 @ 14:40) (16 - 19)  SpO2: 99% (19 @ 14:40) (98% - 100%)  I&O's Summary    17 Sep 2019 07:01  -  18 Sep 2019 07:00  --------------------------------------------------------  IN: 225 mL / OUT: 400 mL / NET: -175 mL        REVIEW OF SYSTEMS:  unable to obtain 2/2 mental status       PHYSICAL EXAM:  GENERAL: NAD, frail, chronically ill appearing  HEAD:  Atraumatic, Normocephalic  NERVOUS SYSTEM:  Alert & Oriented X0, Awake, doesn't follow commands  CHEST/LUNG: poor insp effort   HEART: Regular rate and rhythm; No murmurs, rubs, or gallops  ABDOMEN: Soft, Nontender, Nondistended; Bowel sounds present  EXTREMITIES:  2+ Peripheral Pulses, No clubbing, cyanosis, or edema  SKIN: sacral stage 1 decubitus ulcer L poa    LABS:                        13.0   10.43 )-----------( 199      ( 18 Sep 2019 09:06 )             38.7     -18    141  |  106  |  13  ----------------------------<  99  3.1<L>   |  25  |  0.75    Ca    8.1<L>      18 Sep 2019 09:06  Phos  1.9       Mg     2.1         TPro  7.2  /  Alb  2.9<L>  /  TBili  1.0  /  DBili  x   /  AST  20  /  ALT  16  /  AlkPhos  65  -    PT/INR - ( 16 Sep 2019 18:07 )   PT: 12.8 sec;   INR: 1.12 ratio         PTT - ( 16 Sep 2019 18:07 )  PTT:34.8 sec  Urinalysis Basic - ( 16 Sep 2019 18:02 )    Color: Yellow / Appearance: Turbid / S.015 / pH: x  Gluc: x / Ketone: Negative  / Bili: Negative / Urobili: 8   Blood: x / Protein: 75 mg/dL / Nitrite: Negative   Leuk Esterase: Moderate / RBC: 0-2 /HPF / WBC 26-50   Sq Epi: x / Non Sq Epi: Occasional / Bacteria: Moderate      CAPILLARY BLOOD GLUCOSE           @ 21:11   No growth to date.  --  --   @ 21:08   No growth to date.  --  --   @ 21:07   >100,000 CFU/ml Gram Negative Rods  --  --          MEDICATIONS  (STANDING):  ascorbic acid 500 milliGRAM(s) Oral daily  cefTRIAXone   IVPB 1000 milliGRAM(s) IV Intermittent every 24 hours  cholecalciferol 2000 Unit(s) Oral daily  enoxaparin Injectable 40 milliGRAM(s) SubCutaneous daily  hydrochlorothiazide 12.5 milliGRAM(s) Oral daily  influenza   Vaccine 0.5 milliLiter(s) IntraMuscular once  memantine 10 milliGRAM(s) Oral daily  potassium chloride    Tablet ER 20 milliEquivalent(s) Oral every 2 hours  pregabalin 50 milliGRAM(s) Oral three times a day  traZODone 50 milliGRAM(s) Oral at bedtime    MEDICATIONS  (PRN):  acetaminophen   Tablet .. 650 milliGRAM(s) Oral every 6 hours PRN Temp greater or equal to 38C (100.4F)  magnesium hydroxide Suspension 30 milliLiter(s) Oral daily PRN Constipation

## 2019-09-18 NOTE — DIETITIAN INITIAL EVALUATION ADULT. - OTHER INFO
78 y/o male adm with sepsis, UTI, change in MS. PMH dementia, CVA, amyloid d/s. Pt nonverbal. Spoke with pt's son at bedside. As per pt's son, pt appears to have pain while swallowing. Pt's son requesting for pt to have a "scope" to look in his throat. RD contacted Dr. Dsouza (resident) and explained the request and the fact that he appears to be in pain when swallowing, as per son. Pt hasn't been able to swallow x 3-4 days pta. #. Pt's wt has decreased to 130's, pt regained wt up to 150#. Temporal, orbital, clavicle and shoulder wasting noted. SLP eval pending/ordered. MBS also pending/ordered. Family noted to have refused PEG placement in May 2019. Pt had PEG in the past for feedings after CVA #4, as per son.

## 2019-09-18 NOTE — PROGRESS NOTE ADULT - PROBLEM SELECTOR PLAN 4
2/2 amyloid angiopathy with multiple previous ICH/CVAs causing progressive functional decline with global aphasia and dysphagia, has been improving since at NH, but with recent decline as per hx  - remote tele  - PT/OT  - Neuro Dr Choudhury consulted

## 2019-09-18 NOTE — DIETITIAN INITIAL EVALUATION ADULT. - ADD RECOMMEND
SLP/MBS pending; rx GI consult to evaluate pain while swallowing. Consider PEG feedings if family agrees/if medically feasible and necessary.

## 2019-09-18 NOTE — CHART NOTE - NSCHARTNOTEFT_GEN_A_CORE
Upon Nutritional Assessment by the Registered Dietitian your patient was determined to meet criteria / has evidence of the following diagnosis/diagnoses:          [ ]  Mild Protein Calorie Malnutrition        [ ]  Moderate Protein Calorie Malnutrition        [ x] Severe Protein Calorie Malnutrition        [ ] Unspecified Protein Calorie Malnutrition        [ ] Underweight / BMI <19        [ ] Morbid Obesity / BMI > 40      Findings as based on:  •  Comprehensive nutrition assessment and consultation  •  Calorie counts (nutrient intake analysis)  •  Food acceptance and intake status from observations by staff  •  Follow up  •  Patient education  •  Intervention secondary to interdisciplinary rounds  •   concerns  * temporal, clavicle, orbital, shoulder depletion noted. poor po intake <25% x 5 days.     Treatment:    The following diet has been recommended:  awaiting SLP/MBS eval. Rx GI consult to eval pain while swallowing, as per pt's son. Spoke with Dr. Dsouza. Consider PEG if family aggreeable and medically necessary, if pt unable to meet est nutritional needs via po diet.     PROVIDER Section:     By signing this assessment you are acknowledging and agree with the diagnosis/diagnoses assigned by the Registered Dietitian    Comments:

## 2019-09-18 NOTE — PROGRESS NOTE ADULT - PROBLEM SELECTOR PLAN 2
acute metabolic encephalopathy 2/2 UTI infectious gram neg  unknown baseline mental status however pt has baseline cognitive deficits from previous CVAs and dementia

## 2019-09-18 NOTE — PROGRESS NOTE ADULT - ATTENDING COMMENTS
78 yo male pmx of hemorrhagic CVA, h/o amyloid angiopathy, dementia, HTN, candidal stomatitis, rosacea, blepharitis, chronic constipation, vitamin D deficiency, insomnia, neuralgia, depression, post herpetic neurologia, severe protein caloric malnutrition who presents to the ED with a cc of increasing confusion. Found with + UA and admitted with acute metabolic encephalopathy 2/2 urosepsis Plan: cont antibx, hypokalemia replete, check mag and phosph in am, consider id and palliative, mbs pending, may need peg will obtain gi consult, may consider ENT for further eval and assessment, prognosis guarded to poor due to functional decline, apprec speech/swallow and dietician/nutrition team collaboration

## 2019-09-19 DIAGNOSIS — L89.90 PRESSURE ULCER OF UNSPECIFIED SITE, UNSPECIFIED STAGE: ICD-10-CM

## 2019-09-19 DIAGNOSIS — E43 UNSPECIFIED SEVERE PROTEIN-CALORIE MALNUTRITION: ICD-10-CM

## 2019-09-19 DIAGNOSIS — N39.0 URINARY TRACT INFECTION, SITE NOT SPECIFIED: ICD-10-CM

## 2019-09-19 DIAGNOSIS — E87.8 OTHER DISORDERS OF ELECTROLYTE AND FLUID BALANCE, NOT ELSEWHERE CLASSIFIED: ICD-10-CM

## 2019-09-19 DIAGNOSIS — Z29.9 ENCOUNTER FOR PROPHYLACTIC MEASURES, UNSPECIFIED: ICD-10-CM

## 2019-09-19 LAB
ANION GAP SERPL CALC-SCNC: 8 MMOL/L — SIGNIFICANT CHANGE UP (ref 5–17)
BASOPHILS # BLD AUTO: 0.02 K/UL — SIGNIFICANT CHANGE UP (ref 0–0.2)
BASOPHILS NFR BLD AUTO: 0.2 % — SIGNIFICANT CHANGE UP (ref 0–2)
BUN SERPL-MCNC: 9 MG/DL — SIGNIFICANT CHANGE UP (ref 7–23)
CALCIUM SERPL-MCNC: 8 MG/DL — LOW (ref 8.5–10.1)
CHLORIDE SERPL-SCNC: 107 MMOL/L — SIGNIFICANT CHANGE UP (ref 96–108)
CO2 SERPL-SCNC: 26 MMOL/L — SIGNIFICANT CHANGE UP (ref 22–31)
CREAT SERPL-MCNC: 0.71 MG/DL — SIGNIFICANT CHANGE UP (ref 0.5–1.3)
EOSINOPHIL # BLD AUTO: 0.12 K/UL — SIGNIFICANT CHANGE UP (ref 0–0.5)
EOSINOPHIL NFR BLD AUTO: 1.1 % — SIGNIFICANT CHANGE UP (ref 0–6)
GLUCOSE SERPL-MCNC: 89 MG/DL — SIGNIFICANT CHANGE UP (ref 70–99)
HCT VFR BLD CALC: 40.2 % — SIGNIFICANT CHANGE UP (ref 39–50)
HGB BLD-MCNC: 13.8 G/DL — SIGNIFICANT CHANGE UP (ref 13–17)
IMM GRANULOCYTES NFR BLD AUTO: 0.8 % — SIGNIFICANT CHANGE UP (ref 0–1.5)
LYMPHOCYTES # BLD AUTO: 1.22 K/UL — SIGNIFICANT CHANGE UP (ref 1–3.3)
LYMPHOCYTES # BLD AUTO: 11 % — LOW (ref 13–44)
MCHC RBC-ENTMCNC: 30.4 PG — SIGNIFICANT CHANGE UP (ref 27–34)
MCHC RBC-ENTMCNC: 34.3 GM/DL — SIGNIFICANT CHANGE UP (ref 32–36)
MCV RBC AUTO: 88.5 FL — SIGNIFICANT CHANGE UP (ref 80–100)
MONOCYTES # BLD AUTO: 0.46 K/UL — SIGNIFICANT CHANGE UP (ref 0–0.9)
MONOCYTES NFR BLD AUTO: 4.2 % — SIGNIFICANT CHANGE UP (ref 2–14)
NEUTROPHILS # BLD AUTO: 9.14 K/UL — HIGH (ref 1.8–7.4)
NEUTROPHILS NFR BLD AUTO: 82.7 % — HIGH (ref 43–77)
NRBC # BLD: 0 /100 WBCS — SIGNIFICANT CHANGE UP (ref 0–0)
PLATELET # BLD AUTO: 220 K/UL — SIGNIFICANT CHANGE UP (ref 150–400)
POTASSIUM SERPL-MCNC: 3.6 MMOL/L — SIGNIFICANT CHANGE UP (ref 3.5–5.3)
POTASSIUM SERPL-SCNC: 3.6 MMOL/L — SIGNIFICANT CHANGE UP (ref 3.5–5.3)
RBC # BLD: 4.54 M/UL — SIGNIFICANT CHANGE UP (ref 4.2–5.8)
RBC # FLD: 12.2 % — SIGNIFICANT CHANGE UP (ref 10.3–14.5)
SODIUM SERPL-SCNC: 141 MMOL/L — SIGNIFICANT CHANGE UP (ref 135–145)
WBC # BLD: 11.05 K/UL — HIGH (ref 3.8–10.5)
WBC # FLD AUTO: 11.05 K/UL — HIGH (ref 3.8–10.5)

## 2019-09-19 PROCEDURE — 99233 SBSQ HOSP IP/OBS HIGH 50: CPT | Mod: GC

## 2019-09-19 PROCEDURE — 74230 X-RAY XM SWLNG FUNCJ C+: CPT | Mod: 26

## 2019-09-19 RX ORDER — SODIUM CHLORIDE 9 MG/ML
1000 INJECTION, SOLUTION INTRAVENOUS
Refills: 0 | Status: DISCONTINUED | OUTPATIENT
Start: 2019-09-19 | End: 2019-09-20

## 2019-09-19 RX ORDER — CARBAMAZEPINE 200 MG
100 TABLET ORAL
Refills: 0 | Status: DISCONTINUED | OUTPATIENT
Start: 2019-09-19 | End: 2019-09-28

## 2019-09-19 RX ADMIN — CEFTRIAXONE 100 MILLIGRAM(S): 500 INJECTION, POWDER, FOR SOLUTION INTRAMUSCULAR; INTRAVENOUS at 21:10

## 2019-09-19 RX ADMIN — MEMANTINE HYDROCHLORIDE 10 MILLIGRAM(S): 10 TABLET ORAL at 11:50

## 2019-09-19 RX ADMIN — Medication 50 MILLIGRAM(S): at 05:34

## 2019-09-19 RX ADMIN — SODIUM CHLORIDE 65 MILLILITER(S): 9 INJECTION INTRAMUSCULAR; INTRAVENOUS; SUBCUTANEOUS at 11:50

## 2019-09-19 RX ADMIN — SODIUM CHLORIDE 75 MILLILITER(S): 9 INJECTION, SOLUTION INTRAVENOUS at 16:20

## 2019-09-19 RX ADMIN — Medication 12.5 MILLIGRAM(S): at 05:32

## 2019-09-19 NOTE — PHYSICAL THERAPY INITIAL EVALUATION ADULT - ADDITIONAL COMMENTS
Pt is a resident at [facility] and was ambulatory with RW as per EMR and family

## 2019-09-19 NOTE — GOALS OF CARE CONVERSATION - PERSONAL ADVANCE DIRECTIVE - CONVERSATION DETAILS
Called pts son to review and update molst. He states he wants to speak to his family before agreeing to update . PC will follow Called pts son to review and update molst. He states he wants to speak to his family before agreeing to update . PC will follow    addendum: 9/24/19: 1230hrs: spoke to Geovanni on phone, pt failed MBS & evals, son spoke to physicians: family want PEG placed for feeds, hope to have pt medications given via peg, hope with swallow exercise, pt may be able to eat by mouth, son spoke of risks: aspiration, infections , hope peg will be beneficial to pt. molst reviewed: son agrees to dnr dni long term feeding tube Return to hospital as needed, wants IV fluids & antibiotics.  Dr Conklin to complete updated molst to reflect feeding tube.

## 2019-09-19 NOTE — PHYSICAL THERAPY INITIAL EVALUATION ADULT - PERTINENT HX OF CURRENT PROBLEM, REHAB EVAL
80 y/o male adm 9/16 from [facility] secondary to altered mental status/UTI. CT head: no acute events. Dopplers: negative DVT BLE

## 2019-09-19 NOTE — PROGRESS NOTE ADULT - PROBLEM SELECTOR PLAN 6
As per above  consider neuro c/s 2/2 amyloid angiopathy with multiple previous ICH/CVAs causing progressive functional decline with global aphasia and dysphagia, has been improving since at NH, but with recent decline as per hx  - remote tele  - PT/OT  - Neuro Dr Choudhury consulted

## 2019-09-19 NOTE — PROGRESS NOTE ADULT - PROBLEM SELECTOR PLAN 7
Stable - continue Namenda Chronic   lyrica increased to 50mg tid acute on Chronic, trigeminal   lyrica increased to 50mg tid; start

## 2019-09-19 NOTE — PROGRESS NOTE ADULT - PROBLEM SELECTOR PLAN 4
2/2 amyloid angiopathy with multiple previous ICH/CVAs causing progressive functional decline with global aphasia and dysphagia, has been improving since at NH, but with recent decline as per hx  - remote tele  - PT/OT  - Neuro Dr Choudhury consulted -Pt severely malnourished, Temporal, clavicular, and shoulder muscle wasting present, BMI 19.7  -dysphagia is likely 2/2 past CVAs and progressive deconditioning due to chronic disease state  -discussion w/ family on PEG tube

## 2019-09-19 NOTE — SWALLOW VFSS/MBS ASSESSMENT ADULT - DIAGNOSTIC IMPRESSIONS
1. The patient demonstrated a severe oral dysphagia for puree and nectar thick liquids presented via tsp and cup sip presentations, at which time the patient demonstrated adequate oral acceptance and stripping of the bolus from the utensil, however, he demonstrated absent bolus manipulation and absent posterior transport of the bolus in the oral cavity despite maximum verbal/visual prompting by this clinician and patient's son. Barium was visualized to remain in the anterior oral cavity which was subsequently removed from the patient's oral cavity via Yankauer suctioning.  2. The pharyngeal phase of the swallow could not be formally assessed under fluoroscopy given the severity of the oral phase deficits.  3. Fluoro was turned back on upon removal of each bolus via suctioning, at which time no barium was visualized remaining in the oral cavity.

## 2019-09-19 NOTE — PROGRESS NOTE ADULT - PROBLEM SELECTOR PLAN 3
S&S recommended alternative to PO nutrition  mbs ordered as per s/s eval  discussed with family  per chart review pt was being given comfort feeds   dysphagia is likely 2/2 past CVAs and progressive deconditionig due to chronic disease state  keep NPO for now  aspiration precautions  gentle IV hydration while NPO S&S recommended alternative to PO nutrition  -MBS test failed(9/19) as oral bolus remained in anterior oral cavity. The pharyngeal phase of the swallow could not be formally assessed under fluoroscopy given the severity of the oral phase deficits.  - ENT consulted   - discussed with family  - as per family, pt has had PEG tube in past and was advanced to oral nutrition  - per chart review pt was being given comfort feeds   - dysphagia is likely 2/2 past CVAs and progressive deconditioning due to chronic disease state  - keep NPO for now  - aspiration precautions  - gentle IV hydration while NPO acute metabolic encephalopathy 2/2 UTI infectious gram neg  unknown baseline mental status however pt has baseline cognitive deficits from previous CVAs and dementia  -Dementia stable - continue Namenda acute metabolic encephalopathy 2/2 UTI infectious gram neg; improving  unknown baseline mental status however pt has baseline cognitive deficits from previous CVAs and dementia  -Dementia stable - continue Namenda

## 2019-09-19 NOTE — CONSULT NOTE ADULT - SUBJECTIVE AND OBJECTIVE BOX
HPI:  78 yo male pmx of hemorrhagic CVA on recent admission to [facility] from 5/20-5/23 , h/o amyloid angiopathy, HTN, trigeminal neuralgia, depression, dementia, presents to the ED with CC Of mental status change. No fever chills n/v/ Pt is unable to provide history. Found to have UTI. Also with progressive functional decline with global aphasia and dysphagia.     Infectious Disease consult was called to evaluate pt and for antibiotic management.    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 1st 5 yrs ago and 2 subsequent most recently on labor day  Amyloid disease  No significant past surgical history    Social History--  EtOH: denies   Tobacco: denies   Drug Use: denies      FAMILY HISTORY:  No pertinent family history in first degree relatives    Allergies  No Known Allergies    Home Medications:  hydroCHLOROthiazide 12.5 mg oral tablet: 1 tab(s) orally once a day (16 Sep 2019 21:20)  Lyrica 50 mg oral capsule: 1 cap(s) orally 2 times a day (16 Sep 2019 21:20)  memantine 10 mg oral tablet: 1 tab(s) orally once a day (16 Sep 2019 21:20)  Milk of Magnesia:  (16 Sep 2019 21:20)  traZODone 50 mg oral tablet: 1 tab(s) orally once a day (at bedtime) (16 Sep 2019 21:20)  Vitamin C 500 mg oral tablet: 1 tab(s) orally once a day (16 Sep 2019 21:20)  Vitamin D3 2000 intl units oral tablet: 1 tab(s) orally once a day (16 Sep 2019 21:20)      Current Inpatient Medications :    ANTIBIOTICS:   cefTRIAXone   IVPB 1000 milliGRAM(s) IV Intermittent every 24 hours      OTHER RELEVANT MEDICATIONS :  acetaminophen   Tablet .. 650 milliGRAM(s) Oral every 6 hours PRN  ascorbic acid 500 milliGRAM(s) Oral daily  carBAMazepine 100 milliGRAM(s) Oral two times a day  cholecalciferol 2000 Unit(s) Oral daily  enoxaparin Injectable 40 milliGRAM(s) SubCutaneous daily  hydrochlorothiazide 12.5 milliGRAM(s) Oral daily  influenza   Vaccine 0.5 milliLiter(s) IntraMuscular once  magnesium hydroxide Suspension 30 milliLiter(s) Oral daily PRN  memantine 10 milliGRAM(s) Oral daily  pregabalin 50 milliGRAM(s) Oral three times a day  sodium chloride 0.9%. 1000 milliLiter(s) IV Continuous <Continuous>  traZODone 50 milliGRAM(s) Oral at bedtime    ROS:  Unable to obtain due to : pt's condition    I&O's Detail    18 Sep 2019 07:01  -  19 Sep 2019 07:00  --------------------------------------------------------  IN:    sodium chloride 0.9%.: 780 mL    Solution: 50 mL  Total IN: 830 mL    OUT:  Total OUT: 0 mL    Total NET: 830 mL      19 Sep 2019 07:01  -  19 Sep 2019 12:25  --------------------------------------------------------  IN:    sodium chloride 0.9%.: 325 mL  Total IN: 325 mL    OUT:  Total OUT: 0 mL    Total NET: 325 mL    Physical Exam:  Vital Signs Last 24 Hrs  T(C): 36.7 (19 Sep 2019 04:58), Max: 37 (18 Sep 2019 20:25)  T(F): 98.1 (19 Sep 2019 04:58), Max: 98.6 (18 Sep 2019 20:25)  HR: 96 (19 Sep 2019 08:02) (95 - 107)  BP: 155/90 (19 Sep 2019 04:58) (134/87 - 155/90)  RR: 18 (19 Sep 2019 04:58) (16 - 18)  SpO2: 99% (19 Sep 2019 04:58) (95% - 99%)    General: no acute distress  Eyes: sclera anicteric, pupils equal and reactive to light  ENMT: buccal mucosa moist, pharynx not injected  Neck: supple, trachea midline  Lungs: Decreased, no wheeze/rhonchi  Cardiovascular: regular rate and rhythm, S1 S2  Abdomen: soft, nontender, no organomegaly present, bowel sounds normal  Neurological:  awake confused  Skin:no increased ecchymosis/petechiae/purpura  Lymph Nodes: no palpable cervical/supraclavicular lymph nodes enlargements  Extremities: no cyanosis/clubbing/edema    Labs:                        13.8   11.05 )-----------( 220      ( 19 Sep 2019 09:40 )             40.2   09-19    141  |  107  |  9   ----------------------------<  89  3.6   |  26  |  0.71    Ca    8.0<L>      19 Sep 2019 09:40  Phos  1.9     09-18  Mg     2.1     09-18         RECENT CULTURES:    Culture - Blood (collected 16 Sep 2019 21:11)  Source: .Blood Blood-Venous  Preliminary Report (17 Sep 2019 22:01):    No growth to date.    Culture - Blood (collected 16 Sep 2019 21:08)  Source: .Blood Blood-Venous  Preliminary Report (17 Sep 2019 22:01):    No growth to date.    Culture - Urine (collected 16 Sep 2019 21:07)  Source: .Urine Clean Catch (Midstream)  Final Report (18 Sep 2019 18:48):    >100,000 CFU/ml Proteus mirabilis  Organism: Proteus mirabilis (18 Sep 2019 18:48)  Organism: Proteus mirabilis (18 Sep 2019 18:48)      -  Amikacin: S <=16      -  Ampicillin: S <=8 These ampicillin results predict results for amoxicillin      -  Ampicillin/Sulbactam: S <=8/4 Enterobacter, Citrobacter, and Serratia may develop resistance during prolonged therapy (3-4 days)      -  Aztreonam: S <=4      -  Cefazolin: S <=8 (MIC_CL_COM_ENTERIC_CEFAZU) For uncomplicated UTI with K. pneumoniae, E. coli, or P. mirablis: SELENA <=16 is sensitive and SELENA >=32 is resistant. This also predicts results for oral agents cefaclor, cefdinir, cefpodoxime, cefprozil, cefuroxime axetil, cephalexin and locarbef for uncomplicated UTI. Note that some isolates may be susceptible to these agents while testing resistant to cefazolin.      -  Cefepime: S <=4      -  Cefoxitin: S <=8      -  Ceftriaxone: S <=1 Enterobacter, Citrobacter, and Serratia may develop resistance during prolonged therapy      -  Ciprofloxacin: R >2      -  Gentamicin: S <=4      -  Levofloxacin: R >4      -  Meropenem: S <=1      -  Nitrofurantoin: R >64 Should not be used to treat pyelonephritis      -  Piperacillin/Tazobactam: S <=16      -  Tobramycin: S <=4      -  Trimethoprim/Sulfamethoxazole: S <=2/38      Method Type: SELENA    RADIOLOGY & ADDITIONAL STUDIES:    EXAM:  XR CHEST PORTABLE URGENT 1V                            PROCEDURE DATE:  09/16/2019          INTERPRETATION:  XR CHEST URGENT    Single AP view    HISTORY:  increased weakness    Comparison: Chest x-ray 5/20/2019      The cardiac silhouette is within normal limits. The lungs are clear. No   pleural abnormality.    IMPRESSION: Normal AP chest.      Assessment :   78 yo male pmx of hemorrhagic CVA on recent admission to [facility] from 5/20-5/23 , h/o amyloid angiopathy, HTN, trigeminal neuralgia, depression, dementia, admitted with mental status change sec UTI with Proteus Also with progressive functional decline with global aphasia and dysphagia. High aspiration risk. Family had refused PEG in the past.    Plan :   Cont Rocephin day 4/7  If can tolerate po can change to Vantin to complete course  Trend temps and wbc  Asp precautions    D/w Dr Conklin    Continue with present regime .  Approptiate use of antibiotics and adverse effects reviewed.      I have discussed the above plan of care with patient/family in detail. They expressed understanding of the treatment plan . Risks, benefits and alternatives discussed in detail. I have asked if they have any questions or concerns and appropriately addressed them to the best of my ability .      > 45 minutes spent in direct patient care reviewing  the notes, lab data/ imaging , discussion with multidisciplinary team. All questions were addressed and answered to the best of my capacity .    Thank you for allowing me to participate in the care of your patient .      Gerardo Mercedes MD  Infectious Disease  [phone]

## 2019-09-19 NOTE — PROGRESS NOTE ADULT - PROBLEM SELECTOR PLAN 2
acute metabolic encephalopathy 2/2 UTI infectious gram neg  unknown baseline mental status however pt has baseline cognitive deficits from previous CVAs and dementia Sepsis 2/2 UTI  - continue Rocephin. As per ID, if pt can tolerate po, change to Vantin to complete course  - tylenol prn for fevers  urine cx with gram neg rods  f/u blood cx, urine cx shows multi-drug resistant, >100,000 Proteus mirabilis  leukocytosis resolved

## 2019-09-19 NOTE — PROGRESS NOTE ADULT - PROBLEM SELECTOR PLAN 5
Chronic   lyrica increased to 50mg tid -Pt had phos 1.9, K 3.1, alb 2.9, likely 2/2 poor PO intake  -F/U mg, K, phos, and BMP in AM - Pt had phos 1.9, K 3.1, alb 2.9, likely 2/2 poor PO intake  -F/U mg, K, phos, and BMP in AM

## 2019-09-19 NOTE — PROGRESS NOTE ADULT - ASSESSMENT
78 yo male pmx of hemorrhagic CVA, h/o amyloid angiopathy, dementia, HTN, candidal stomatitis, rosacea, blepharitis,chronic constipation, vitamin D deficiency, insomnia, neuralgia, depression, post herpetic neurologia, acute severe protein caloric malnutrition who presents to the ED with a cc of increasing confusion. Found with + UA and admitted with acute metabolic encephalopathy 2/2 urosepsis 80 yo male pmx of hemorrhagic CVA, h/o amyloid angiopathy, dementia, HTN, candidal stomatitis, rosacea, blepharitis,chronic constipation, vitamin D deficiency, insomnia, neuralgia, depression, post herpetic neurologia, acute severe protein caloric malnutrition who presents to the ED with a cc of increasing confusion. Found with + UA and admitted with acute metabolic encephalopathy 2/2 urosepsis and now dysphagia sec to trigeminal neuralgia exacerbation

## 2019-09-19 NOTE — CONSULT NOTE ADULT - SUBJECTIVE AND OBJECTIVE BOX
Date/Time Patient Seen:  		  Referring MD:   Data Reviewed	       Patient is a 79y old  Male who presents with a chief complaint of AMS/uti (18 Sep 2019 17:27)      Subjective/HPI  in bed  seen and examined  vs and meds reviewed    poor historian - known Dementia     78 yo male pmx of hemorrhagic CVA, h/o amyloid angiopathy, dementia, HTN, candidal stomatitis, rosacea, blepharitis,chronic constipation, vitamin D deficiency, insomnia, neuralgia, depression, post herpetic neurologia, acute severe protein caloric malnutrition who presents to the ED with a cc of increasing confusion. Found with + UA and admitted with acute metabolic encephalopathy 2/2 urosepsis     Language:  · Patient/Family of Limited English Proficiency	Yes  · Language	Wolof     Patient Identity:  · Birth Sex	Male       History of Present Illness:  Reason for Admission: AMS/uti  History of Present Illness:   78 yo male pmx of hemorrhagic CVA, h/o amyloid angiopathy, dementia, HTN, candidal stomatitis, rosacea, blepharitis, chronic constipation, vitamin D deficiency, insomnia, neuralgia, depression, post herpetic neurologia who presents to the ED with a cc of increasing confusion.   Recently admitted to [facility] from 5/20-5/23 after pt was found to have new small amount of hemorrhage in the right posterior frontal parasagittal region.  No acute intervention was preformed.  He was noted to have a progressive functional decline with global aphasia and dysphagia.  PEG was recommend family refused.  Pt was discharged to NH.  Per wife history pt has been improving during his stay at the NH.  He had progressed to being able to ambulate with a walker and assistance and was able to tolerate soft mechanical soft diet and soon to be advanced to regular per son.  Approx 1 week ago pt had a flare of his trigeminal neuralgia and since then has had a progressive decline.  They reports that he has not been able to ambulate and has not been able to swallow for the last 3-4 days.  These symptoms were similar to his last re-bleed and so they became concerned and came to the ED for further work up.     Review of Systems:  Review of Systems: unable to obtain due to mental status      Dementia.     PAST SURGICAL HISTORY:  No significant past surgical history.     FAMILY HISTORY:  No pertinent family history in first degree relatives.     No Pertinent Family History in first degree relatives of: Father/mother.     Social History:  Social History (marital status, living situation, occupation, tobacco use, alcohol and drug use, and sexual history): Dependent with all ADLs including feedings  Previously some ambulation with walker but now non ambulatory     Tobacco Screening:  · Core Measure Site	Yes  · Has the patient used tobacco in the past 30 days?	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 1000 milliLiter(s) (65 mL/Hr) IV Continuous <Continuous>  traZODone 50 milliGRAM(s) Oral at bedtime    MEDICATIONS  (PRN):  acetaminophen   Tablet .. 650 milliGRAM(s) Oral every 6 hours PRN Temp greater or equal to 38C (100.4F)  magnesium hydroxide Suspension 30 milliLiter(s) Oral daily PRN Constipation         Vitals log        ICU Vital Signs Last 24 Hrs  T(C): 36.7 (19 Sep 2019 04:58), Max: 37 (18 Sep 2019 20:25)  T(F): 98.1 (19 Sep 2019 04:58), Max: 98.6 (18 Sep 2019 20:25)  HR: 100 (19 Sep 2019 04:58) (95 - 107)  BP: 155/90 (19 Sep 2019 04:58) (134/87 - 155/90)  BP(mean): --  ABP: --  ABP(mean): --  RR: 18 (19 Sep 2019 04:58) (16 - 18)  SpO2: 99% (19 Sep 2019 04:58) (95% - 99%)           Input and Output:  I&O's Detail    18 Sep 2019 07:01  -  19 Sep 2019 07:00  --------------------------------------------------------  IN:    sodium chloride 0.9%.: 780 mL    Solution: 50 mL  Total IN: 830 mL    OUT:  Total OUT: 0 mL    Total NET: 830 mL          Lab Data                        13.0   10.43 )-----------( 199      ( 18 Sep 2019 09:06 )             38.7     09-18    141  |  106  |  13  ----------------------------<  99  3.1<L>   |  25  |  0.75    Ca    8.1<L>      18 Sep 2019 09:06  Phos  1.9     09-18  Mg     2.1     09-18              Review of Systems	  weak      Objective     Physical Examination    heart s1s2  lung dec BS  abd soft  frail  head nc      Pertinent Lab findings & Imaging      Angel:  NO   Adequate UO     I&O's Detail    18 Sep 2019 07:01  -  19 Sep 2019 07:00  --------------------------------------------------------  IN:    sodium chloride 0.9%.: 780 mL    Solution: 50 mL  Total IN: 830 mL    OUT:  Total OUT: 0 mL    Total NET: 830 mL               Discussed with:     Cultures:	        Radiology      EXAM:  US DPLX LWR EXT VEINS COMPL BI                            PROCEDURE DATE:  09/17/2019          INTERPRETATION:  [facility] RADIOLOGIST PRELIMINARY REPORT    EXAM:   US Duplex Bilateral Lower Extremity Veins     EXAM DATE/TIME:   9/17/2019 1:36 AM     CLINICAL HISTORY:   79 years old, male; Swelling (edema) of limb; Lower extremity, bilateral     TECHNIQUE:   Imaging protocol: Real-time duplex ultrasound of the Bilateral Lower   Extremities with 2-D gray scale, color Doppler flow and spectral waveform   analysis with image documentation. Complete exam focused on the bilateral   lower   extremity veins.     COMPARISON:   No relevant prior studies available.     FINDINGS:   Right deep veins: Unremarkable. The common femoral, femoral, proximal   profunda   femoral and popliteal veins are patent without thrombus. Normal Doppler   waveforms. Normal compressibility and/or augmentation response.    Right superficial veins: Saphenofemoral junction is patent without   thrombus.     Left deep veins: Unremarkable. The common femoral, femoral, proximal   profunda   femoral and popliteal veins are patent without thrombus. Normal Doppler   waveforms. Normal compressibility and/or augmentation response.    Left superficial veins: Saphenofemoral junction is patent without   thrombus.     Soft tissues: Unremarkable.     IMPRESSION:   No acute findings. No evidence of deep vein thrombosis.    Official report:    CLINICAL STATEMENT: Swelling leg.    TECHNIQUE: Ultrasound of bilateral lower extremity deep venous system.    COMPARISON: None.    FINDINGS:  There is color and spectral flow, compression and augmentation of the   common femoral, superficial femoral and popliteal veins.    There is flow in the posterior tibial vein.    IMPRESSION:  No evidence of DVT.                 DEEDEE VILLALOBOS M.D., ATTENDING RADIOLOGIST  This document has been electronically signed. Sep 17 2019  7:23AM      EXAM:  XR CHEST PORTABLE URGENT 1V                            PROCEDURE DATE:  09/16/2019          INTERPRETATION:  XR CHEST URGENT    Single AP view    HISTORY:  increased weakness    Comparison: Chest x-ray 5/20/2019      The cardiac silhouette is within normal limits. The lungs are clear. No   pleural abnormality.    IMPRESSION: Normal AP chest.                JESSICA MARS M.D., ATTENDING RADIOLOGIST  This document has been electronically signed. Sep 16 2019  9:00PM

## 2019-09-19 NOTE — PROGRESS NOTE ADULT - ATTENDING COMMENTS
Problem/Plan 11   IMPROVE VTE Individual Risk Assessment        RISK                                                          Points  [  ] Previous VTE                                                3  [  ] Thrombophilia                                             2  [ x ] Lower limb paralysis                                   2        (unable to hold up >15 seconds)    [  ] Current Cancer                                             2         (within 6 months)  [ x ] Immobilization > 24 hrs                              1  [  ] ICU/CCU stay > 24 hours                             1  [ x ] Age > 60                                                         1  IMPROVE VTE Score: 4  DVT ppx: lovenox  Continue vitamins 80 yo male pmx of hemorrhagic CVA, h/o amyloid angiopathy, dementia, HTN, candidal stomatitis, rosacea, blepharitis,chronic constipation, vitamin D deficiency, insomnia, neuralgia, depression, post herpetic neurologia, acute severe protein caloric malnutrition who presents to the ED with a cc of increasing confusion. Found with + UA and admitted with acute metabolic encephalopathy 2/2 urosepsis and now dysphagia sec to trigeminal neuralgia exacerbation Plan: apprec neuro recs start tegetrol, monitor response and clinical course, apprec ENT eval to r/o any mechanical causes, apprec s/s re-eval, consider PEG if fails trials

## 2019-09-19 NOTE — PROGRESS NOTE ADULT - ASSESSMENT
78 yo male pmx of hemorrhagic CVA, h/o amyloid angiopathy, dementia, HTN, candidal stomatitis, rosacea, blepharitis,chronic constipation, vitamin D deficiency, insomnia, neuralgia, depression, post herpetic neurologia, acute severe protein caloric malnutrition who presents to the ED with a cc of increasing confusion. Found with + UA and admitted with acute metabolic encephalopathy 2/2 urosepsis

## 2019-09-19 NOTE — PROGRESS NOTE ADULT - PROBLEM SELECTOR PLAN 1
Sepsis 2/2 UTI  continue Rocephin   tylenol prn for fevers  urine cx with gram neg rods  f/u blood cx and urine cx sensitivities  leukocytosis resolved Sepsis 2/2 UTI  - continue Rocephin. As per ID, if pt can tolerate po, change to Vantin to complete course  - tylenol prn for fevers  urine cx with gram neg rods  f/u blood cx, urine cx shows multi-drug resistant, >100,000 Proteus mirabilis  leukocytosis resolved S&S recommended alternative to PO nutrition  -MBS test failed(9/19) as oral bolus remained in anterior oral cavity. The pharyngeal phase of the swallow could not be formally assessed under fluoroscopy given the severity of the oral phase deficits.  - ENT consulted   - discussed with family  - as per family, pt has had PEG tube in past and was advanced to oral nutrition  - per chart review pt was being given comfort feeds   - dysphagia is likely 2/2 past CVAs and progressive deconditioning due to chronic disease state  - Continue NPO diet for now  - aspiration precautions  - gentle IV hydration while NPO S&S recommended alternative to PO nutrition  -MBS test failed(9/19) as oral bolus remained in anterior oral cavity. The pharyngeal phase of the swallow could not be formally assessed under fluoroscopy given the severity of the oral phase deficits.  - ENT Dr. Robins consulted   - discussed with family  - as per family, pt has had PEG tube in past and was advanced to oral nutrition  - per chart review pt was being given comfort feeds   - dysphagia is likely 2/2 past CVAs and progressive deconditioning due to chronic disease state  - Continue NPO diet for now  - aspiration precautions  - gentle IV hydration while NPO S&S recommended alternative to PO nutrition  -MBS test failed(9/19) as oral bolus remained in anterior oral cavity. The pharyngeal phase of the swallow could not be formally assessed under fluoroscopy given the severity of the oral phase deficits.  - ENT Dr. Hill consulted   - discussed with family  - as per family, pt has had PEG tube in past and was advanced to oral nutrition  - per chart review pt was being given comfort feeds   - dysphagia is likely 2/2 past CVAs and progressive deconditioning due to chronic disease state  - Continue NPO diet for now  - aspiration precautions  - gentle IV hydration while NPO S&S recommended alternative to PO nutrition  -MBS test failed(9/19) as oral bolus remained in anterior oral cavity. The pharyngeal phase of the swallow could not be formally assessed under fluoroscopy given the severity of the oral phase deficits.  - ENT Dr. Hill consulted   -apprec neuro recs for trigeminal neuralgia tx, reasses s/s eval tomorrow  - discussed with family  - as per family, pt has had PEG tube in past and was advanced to oral nutrition  - per chart review pt was being given comfort feeds   - dysphagia is likely 2/2 past CVAs and progressive deconditioning due to chronic disease state  - Continue NPO diet for now  - aspiration precautions  - gentle IV hydration while NPO

## 2019-09-19 NOTE — PROGRESS NOTE ADULT - PROBLEM SELECTOR PLAN 10
IMPROVE VTE Individual Risk Assessment        RISK                                                          Points  [  ] Previous VTE                                                3  [  ] Thrombophilia                                             2  [ x ] Lower limb paralysis                                   2        (unable to hold up >15 seconds)    [  ] Current Cancer                                             2         (within 6 months)  [ x ] Immobilization > 24 hrs                              1  [  ] ICU/CCU stay > 24 hours                             1  [ x ] Age > 60                                                         1  IMPROVE VTE Score: 4  DVT ppx: lovenox  Continue vitamins Stable - continue trazodone Stable - continue trazodone    Problem/Plan 11   IMPROVE VTE Individual Risk Assessment        RISK                                                          Points  [  ] Previous VTE                                                3  [  ] Thrombophilia                                             2  [ x ] Lower limb paralysis                                   2        (unable to hold up >15 seconds)    [  ] Current Cancer                                             2         (within 6 months)  [ x ] Immobilization > 24 hrs                              1  [  ] ICU/CCU stay > 24 hours                             1  [ x ] Age > 60                                                         1  IMPROVE VTE Score: 4  DVT ppx: lovenox  Continue vitamins

## 2019-09-19 NOTE — PROGRESS NOTE ADULT - SUBJECTIVE AND OBJECTIVE BOX
Patient is a 79y old  Male who presents with a chief complaint of AMS/uti (19 Sep 2019 12:25)      INTERVAL HPI: Pt seen and examined at bedside. Pt has had poor PO intake x 5 days. Unable to illicit ROS as pt is nonverbal at baseline and non-compliant with physical exam.  OVERNIGHT EVENTS: No Acute Overnight events.  T(F): 98.1 (09-19-19 @ 04:58), Max: 98.6 (09-18-19 @ 20:25)  HR: 96 (09-19-19 @ 08:02) (95 - 107)  BP: 155/90 (09-19-19 @ 04:58) (134/87 - 155/90)  RR: 18 (09-19-19 @ 04:58) (16 - 18)  SpO2: 99% (09-19-19 @ 04:58) (95% - 99%)  I&O's Summary    18 Sep 2019 07:01  -  19 Sep 2019 07:00  --------------------------------------------------------  IN: 830 mL / OUT: 0 mL / NET: 830 mL    19 Sep 2019 07:01  -  19 Sep 2019 12:52  --------------------------------------------------------  IN: 325 mL / OUT: 0 mL / NET: 325 mL        REVIEW OF SYSTEMS:  unable to obtain 2/2 mental status        PHYSICAL EXAM:  GENERAL: NAD, frail, chronically ill appearing  HEAD:  Atraumatic, Normocephalic  NERVOUS SYSTEM:  Alert & Oriented X0, Awake, doesn't follow commands  CHEST/LUNG: Decreased lung sounds d/t poor inspiratory effort  HEART: Regular rate and rhythm; No murmurs, rubs, or gallops  ABDOMEN: Soft, Nontender, Nondistended; Bowel sounds present  EXTREMITIES:  2+ Peripheral Pulses, No clubbing, cyanosis, or edema  SKIN: Sacral stage 1 decubitus ulcer L poa. Temporal, clavicle, and shoulder muscle wasting.    LABS:                        13.8   11.05 )-----------( 220      ( 19 Sep 2019 09:40 )             40.2     09-19    141  |  107  |  9   ----------------------------<  89  3.6   |  26  |  0.71    Ca    8.0<L>      19 Sep 2019 09:40  Phos  1.9     09-18  Mg     2.1     09-18          CAPILLARY BLOOD GLUCOSE          09-16 @ 21:11   No growth to date.  --  --  09-16 @ 21:08   No growth to date.  --  --  09-16 @ 21:07   >100,000 CFU/ml Proteus mirabilis  --  Proteus mirabilis          MEDICATIONS  (STANDING):  ascorbic acid 500 milliGRAM(s) Oral daily  carBAMazepine 100 milliGRAM(s) Oral two times a day  cefTRIAXone   IVPB 1000 milliGRAM(s) IV Intermittent every 24 hours  cholecalciferol 2000 Unit(s) Oral daily  enoxaparin Injectable 40 milliGRAM(s) SubCutaneous daily  hydrochlorothiazide 12.5 milliGRAM(s) Oral daily  influenza   Vaccine 0.5 milliLiter(s) IntraMuscular once  memantine 10 milliGRAM(s) Oral daily  pregabalin 50 milliGRAM(s) Oral three times a day  sodium chloride 0.9%. 1000 milliLiter(s) (65 mL/Hr) IV Continuous <Continuous>  traZODone 50 milliGRAM(s) Oral at bedtime    MEDICATIONS  (PRN):  acetaminophen   Tablet .. 650 milliGRAM(s) Oral every 6 hours PRN Temp greater or equal to 38C (100.4F)  magnesium hydroxide Suspension 30 milliLiter(s) Oral daily PRN Constipation

## 2019-09-19 NOTE — PROGRESS NOTE ADULT - PROBLEM SELECTOR PLAN 8
Controlled - continue HCTZ - Protective barrier cream/ointment, Cavilon applied - Sacral stage 1 decubitus ulcer L poa  - Protective barrier cream/ointment, Cavilon applied

## 2019-09-19 NOTE — PHYSICAL THERAPY INITIAL EVALUATION ADULT - LEVEL OF INDEPENDENCE: SIT/STAND, REHAB EVAL
attempted with maxA x2 at this time and unable- needs lift and pt returned back to bed secondary to recliners unavailable at this time

## 2019-09-19 NOTE — PROGRESS NOTE ADULT - PROBLEM SELECTOR PLAN 3
S&S recommended alternative to PO nutrition  mbs ordered as per s/s eval  discussed with family  per chart review pt was being given comfort feeds   dysphagia is likely 2/2 past CVAs and progressive deconditionig due to chronic disease state  keep NPO for now  aspiration precautions  gentle IV hydration while NPO

## 2019-09-20 LAB
ANION GAP SERPL CALC-SCNC: 9 MMOL/L — SIGNIFICANT CHANGE UP (ref 5–17)
BUN SERPL-MCNC: 8 MG/DL — SIGNIFICANT CHANGE UP (ref 7–23)
CALCIUM SERPL-MCNC: 8.6 MG/DL — SIGNIFICANT CHANGE UP (ref 8.5–10.1)
CHLORIDE SERPL-SCNC: 104 MMOL/L — SIGNIFICANT CHANGE UP (ref 96–108)
CO2 SERPL-SCNC: 26 MMOL/L — SIGNIFICANT CHANGE UP (ref 22–31)
CREAT SERPL-MCNC: 0.77 MG/DL — SIGNIFICANT CHANGE UP (ref 0.5–1.3)
GLUCOSE SERPL-MCNC: 92 MG/DL — SIGNIFICANT CHANGE UP (ref 70–99)
HCT VFR BLD CALC: 39.9 % — SIGNIFICANT CHANGE UP (ref 39–50)
HGB BLD-MCNC: 13.8 G/DL — SIGNIFICANT CHANGE UP (ref 13–17)
MAGNESIUM SERPL-MCNC: 2.2 MG/DL — SIGNIFICANT CHANGE UP (ref 1.6–2.6)
MCHC RBC-ENTMCNC: 30.4 PG — SIGNIFICANT CHANGE UP (ref 27–34)
MCHC RBC-ENTMCNC: 34.6 GM/DL — SIGNIFICANT CHANGE UP (ref 32–36)
MCV RBC AUTO: 87.9 FL — SIGNIFICANT CHANGE UP (ref 80–100)
NRBC # BLD: 0 /100 WBCS — SIGNIFICANT CHANGE UP (ref 0–0)
PHOSPHATE SERPL-MCNC: 2.7 MG/DL — SIGNIFICANT CHANGE UP (ref 2.5–4.5)
PLATELET # BLD AUTO: 224 K/UL — SIGNIFICANT CHANGE UP (ref 150–400)
POTASSIUM SERPL-MCNC: 4.4 MMOL/L — SIGNIFICANT CHANGE UP (ref 3.5–5.3)
POTASSIUM SERPL-SCNC: 4.4 MMOL/L — SIGNIFICANT CHANGE UP (ref 3.5–5.3)
RBC # BLD: 4.54 M/UL — SIGNIFICANT CHANGE UP (ref 4.2–5.8)
RBC # FLD: 12.1 % — SIGNIFICANT CHANGE UP (ref 10.3–14.5)
SODIUM SERPL-SCNC: 139 MMOL/L — SIGNIFICANT CHANGE UP (ref 135–145)
WBC # BLD: 13.57 K/UL — HIGH (ref 3.8–10.5)
WBC # FLD AUTO: 13.57 K/UL — HIGH (ref 3.8–10.5)

## 2019-09-20 PROCEDURE — 99233 SBSQ HOSP IP/OBS HIGH 50: CPT | Mod: GC

## 2019-09-20 RX ORDER — CEFTRIAXONE 500 MG/1
1000 INJECTION, POWDER, FOR SOLUTION INTRAMUSCULAR; INTRAVENOUS EVERY 24 HOURS
Refills: 0 | Status: COMPLETED | OUTPATIENT
Start: 2019-09-20 | End: 2019-09-22

## 2019-09-20 RX ORDER — SODIUM CHLORIDE 9 MG/ML
1000 INJECTION, SOLUTION INTRAVENOUS
Refills: 0 | Status: DISCONTINUED | OUTPATIENT
Start: 2019-09-20 | End: 2019-09-21

## 2019-09-20 RX ADMIN — Medication 50 MILLIGRAM(S): at 05:30

## 2019-09-20 RX ADMIN — CEFTRIAXONE 100 MILLIGRAM(S): 500 INJECTION, POWDER, FOR SOLUTION INTRAMUSCULAR; INTRAVENOUS at 21:36

## 2019-09-20 RX ADMIN — SODIUM CHLORIDE 75 MILLILITER(S): 9 INJECTION, SOLUTION INTRAVENOUS at 05:32

## 2019-09-20 RX ADMIN — Medication 100 MILLIGRAM(S): at 05:24

## 2019-09-20 RX ADMIN — Medication 12.5 MILLIGRAM(S): at 05:25

## 2019-09-20 RX ADMIN — ENOXAPARIN SODIUM 40 MILLIGRAM(S): 100 INJECTION SUBCUTANEOUS at 11:48

## 2019-09-20 RX ADMIN — SODIUM CHLORIDE 75 MILLILITER(S): 9 INJECTION, SOLUTION INTRAVENOUS at 12:13

## 2019-09-20 NOTE — PROGRESS NOTE ADULT - SUBJECTIVE AND OBJECTIVE BOX
JADE VILLALOBOS is a 79yMale , patient examined and chart reviewed.    INTERVAL HPI/ OVERNIGHT EVENTS:   No events. afebrile. Failed MBS.    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    ROS:  Unable to obtain due to : Pt' condition    Current inpatient medications :    ANTIBIOTICS/RELEVANT:  cefTRIAXone   IVPB 1000 milliGRAM(s) IV Intermittent every 24 hours    acetaminophen   Tablet .. 650 milliGRAM(s) Oral every 6 hours PRN  ascorbic acid 500 milliGRAM(s) Oral daily  carBAMazepine 100 milliGRAM(s) Oral two times a day  cholecalciferol 2000 Unit(s) Oral daily  dextrose 5% + lactated ringers. 1000 milliLiter(s) IV Continuous <Continuous>  enoxaparin Injectable 40 milliGRAM(s) SubCutaneous daily  hydrochlorothiazide 12.5 milliGRAM(s) Oral daily  magnesium hydroxide Suspension 30 milliLiter(s) Oral daily PRN  memantine 10 milliGRAM(s) Oral daily  pregabalin 50 milliGRAM(s) Oral three times a day  traZODone 50 milliGRAM(s) Oral at bedtime      Objective:    09-19 @ 07:01  -  09-20 @ 07:00  --------------------------------------------------------  IN: 1685 mL / OUT: 0 mL / NET: 1685 mL    09-20 @ 07:01  -  09-20 @ 16:57  --------------------------------------------------------  IN: 750 mL / OUT: 0 mL / NET: 750 mL      T(C): 36.4 (09-20-19 @ 12:44), Max: 36.6 (09-19-19 @ 21:03)  HR: 98 (09-20-19 @ 12:44) (97 - 105)  BP: 151/98 (09-20-19 @ 12:44) (148/79 - 151/98)  RR: 18 (09-20-19 @ 12:44) (16 - 18)  SpO2: 99% (09-20-19 @ 12:44) (97% - 99%)    Physical Exam:  General: no acute distress  Eyes: sclera anicteric, pupils equal and reactive to light  ENMT: buccal mucosa moist, pharynx not injected  Neck: supple, trachea midline  Lungs: Decreased, no wheeze/rhonchi  Cardiovascular: regular rate and rhythm, S1 S2  Abdomen: soft, nontender, no organomegaly present, bowel sounds normal  Neurological: awake confused  Skin: no increased ecchymosis/petechiae/purpura  Lymph Nodes: no palpable cervical/supraclavicular lymph nodes enlargements  Extremities: no cyanosis/clubbing/edema      LABS:                        13.8   13.57 )-----------( 224      ( 20 Sep 2019 07:39 )             39.9       09-20    139  |  104  |  8   ----------------------------<  92  4.4   |  26  |  0.77    Ca    8.6      20 Sep 2019 07:39  Phos  2.7     09-20  Mg     2.2     09-20    MICROBIOLOGY:    Culture - Blood (collected 16 Sep 2019 21:11)  Source: .Blood Blood-Venous  Preliminary Report (17 Sep 2019 22:01):    No growth to date.    Culture - Blood (collected 16 Sep 2019 21:08)  Source: .Blood Blood-Venous  Preliminary Report (17 Sep 2019 22:01):    No growth to date.    Culture - Urine (collected 16 Sep 2019 21:07)  Source: .Urine Clean Catch (Midstream)  Final Report (18 Sep 2019 18:48):    >100,000 CFU/ml Proteus mirabilis  Organism: Proteus mirabilis (18 Sep 2019 18:48)  Organism: Proteus mirabilis (18 Sep 2019 18:48)      -  Amikacin: S <=16      -  Ampicillin: S <=8 These ampicillin results predict results for amoxicillin      -  Ampicillin/Sulbactam: S <=8/4 Enterobacter, Citrobacter, and Serratia may develop resistance during prolonged therapy (3-4 days)      -  Aztreonam: S <=4      -  Cefazolin: S <=8 (MIC_CL_COM_ENTERIC_CEFAZU) For uncomplicated UTI with K. pneumoniae, E. coli, or P. mirablis: SELENA <=16 is sensitive and SELENA >=32 is resistant. This also predicts results for oral agents cefaclor, cefdinir, cefpodoxime, cefprozil, cefuroxime axetil, cephalexin and locarbef for uncomplicated UTI. Note that some isolates may be susceptible to these agents while testing resistant to cefazolin.      -  Cefepime: S <=4      -  Cefoxitin: S <=8      -  Ceftriaxone: S <=1 Enterobacter, Citrobacter, and Serratia may develop resistance during prolonged therapy      -  Ciprofloxacin: R >2      -  Gentamicin: S <=4      -  Levofloxacin: R >4      -  Meropenem: S <=1      -  Nitrofurantoin: R >64 Should not be used to treat pyelonephritis      -  Piperacillin/Tazobactam: S <=16      -  Tobramycin: S <=4      -  Trimethoprim/Sulfamethoxazole: S <=2/38      Method Type: SELENA    RADIOLOGY & ADDITIONAL STUDIES:    EXAM:  XR CHEST PORTABLE URGENT 1V                            PROCEDURE DATE:  09/16/2019          INTERPRETATION:  XR CHEST URGENT    Single AP view    HISTORY:  increased weakness    Comparison: Chest x-ray 5/20/2019      The cardiac silhouette is within normal limits. The lungs are clear. No   pleural abnormality.    IMPRESSION: Normal AP chest.      Assessment :   78 yo male pmx of hemorrhagic CVA on recent admission to [facility] from 5/20-5/23 , h/o amyloid angiopathy, HTN, trigeminal neuralgia, depression, dementia, admitted with mental status change sec UTI with Proteus Also with progressive functional decline with global aphasia and dysphagia. High aspiration risk. Failed MBS. Family had refused PEG in the past. WBC trending up.    Plan :   Cont Rocephin day 5/7  NPO  Trend temps and wbc  Asp precautions    D/w Dr Conklin    Continue with present regiment.  Appropriate use of antibiotics and adverse effects reviewed.      I have discussed the above plan of care with patient/ family in detail. They expressed understanding of the  treatment plan . Risks, benefits and alternatives discussed in detail. I have asked if they have any questions or concerns and appropriately addressed them to the best of my ability .    > 35 minutes were spent in direct patient care reviewing notes, medications ,labs data/ imaging , discussion with multidisciplinary team.    Thank you for allowing me to participate in care of your patient .    Gerardo Mercedes MD  Infectious Disease  [phone]

## 2019-09-20 NOTE — CONSULT NOTE ADULT - SUBJECTIVE AND OBJECTIVE BOX
Chief Complaint:  Patient is a 79y old  Male who presents with a chief complaint of AMS/uti (20 Sep 2019 12:53)    Dementia  CVA (cerebral infarction)  Amyloid disease  No significant past surgical history     HPI:  80 yo male pmx of hemorrhagic CVA, h/o amyloid angiopathy, dementia, HTN, candidal stomatitis, rosacea, blepharitis, chronic constipation, vitamin D deficiency, insomnia, neuralgia, depression, post herpetic neurologia who presents to the ED with a cc of increasing confusion.   Recently admitted to [facility] from - after pt was found to have new small amount of hemorrhage in the right posterior frontal parasagittal region.  No acute intervention was preformed.  He was noted to have a progressive functional decline with global aphasia and dysphagia.  PEG was recommend family refused.  Pt was discharged to NH.  Per wife history pt has been improving during his stay at the NH.  He had progressed to being able to ambulate with a walker and assistance and was able to tolerate soft mechanical soft diet and soon to be advanced to regular per son.  Approx 1 week ago pt had a flare of his trigeminal neuralgia and since then has had a progressive decline.  They reports that he has not been able to ambulate and has not been able to swallow for the last 3-4 days.  These symptoms were similar to his last re-bleed and so they became concerned and came to the ED for further work up. (16 Sep 2019 21:26)    gi consulted for dysphagia. chart reviewed- pt failed [facility] in may, family refused peg at that time. pt seen and examined. unable to provide meaningful hx. info obtained via chart and staff. per dw medicine,       No Known Allergies      acetaminophen   Tablet .. 650 milliGRAM(s) Oral every 6 hours PRN  ascorbic acid 500 milliGRAM(s) Oral daily  carBAMazepine 100 milliGRAM(s) Oral two times a day  cefTRIAXone   IVPB 1000 milliGRAM(s) IV Intermittent every 24 hours  cholecalciferol 2000 Unit(s) Oral daily  dextrose 5% + lactated ringers. 1000 milliLiter(s) IV Continuous <Continuous>  enoxaparin Injectable 40 milliGRAM(s) SubCutaneous daily  hydrochlorothiazide 12.5 milliGRAM(s) Oral daily  influenza   Vaccine 0.5 milliLiter(s) IntraMuscular once  magnesium hydroxide Suspension 30 milliLiter(s) Oral daily PRN  memantine 10 milliGRAM(s) Oral daily  pregabalin 50 milliGRAM(s) Oral three times a day  traZODone 50 milliGRAM(s) Oral at bedtime        FAMILY HISTORY:  No pertinent family history in first degree relatives        Review of Systems:    General:  No wt loss, fevers, chills, night sweats, fatigue  Eyes:  Good vision, no reported pain  ENT:  No sore throat, pain, runny nose, dysphagia  CV:  No pain, palpitations, no lightheadedness  Resp:  No dyspnea, cough, tachypnea, wheezing  GI: .  :  No pain, bleeding, incontinence, nocturia  Muscle:  No pain, weakness  Neuro:  No weakness, tingling, memory problems  Psych:  No fatigue, insomnia, mood problems, depression  Endocrine:  No polyuria, polydypsia, cold/heat intolerance  Heme:  No petechiae, ecchymosis, easy bruisability  Skin:  No rash, tattoos, scars, edema    Relevant Family History:   n/c    Relevant Social History: n/c      Physical Exam:    Vital Signs:  Vital Signs Last 24 Hrs  T(C): 36.4 (20 Sep 2019 12:44), Max: 36.6 (19 Sep 2019 21:03)  T(F): 97.6 (20 Sep 2019 12:44), Max: 97.8 (19 Sep 2019 21:03)  HR: 98 (20 Sep 2019 12:44) (97 - 105)  BP: 151/98 (20 Sep 2019 12:44) (148/79 - 151/98)  BP(mean): --  RR: 18 (20 Sep 2019 12:44) (16 - 18)  SpO2: 99% (20 Sep 2019 12:44) (97% - 99%)  Daily     Daily Weight in k.4 (20 Sep 2019 12:43)    General:  Appears stated age, well-groomed, nad  HEENT:  NC/AT,  conjunctivae clear and pink, no thyromegaly, nodules, adenopathy, no JVD  Chest:  Full & symmetric excursion, no increased effort, breath sounds clear  Cardiovascular:  Regular rhythm, S1, S2, no murmur/rub/S3/S4, no abdominal bruit, no edema  Abdomen:  Soft, non-tender, non-distended, normoactive bowel sounds,  no masses ,no hepatosplenomeagaly, no signs of chronic liver disease  Extremities:  no cyanosis,clubbing or edema  Skin:  No rash/erythema/ecchymoses/petechiae/wounds/abscess/warm/dry  Neuro/Psych:  A&O  , no asterixis, no tremor, no encephalopathy    Laboratory:                            13.8   13.57 )-----------( 224      ( 20 Sep 2019 07:39 )             39.9     09-    139  |  104  |  8   ----------------------------<  92  4.4   |  26  |  0.77    Ca    8.6      20 Sep 2019 07:39  Phos  2.7       Mg     2.2                   Imaging: Chief Complaint:  Patient is a 79y old  Male who presents with a chief complaint of AMS/uti (20 Sep 2019 12:53)    Dementia  CVA (cerebral infarction)  Amyloid disease  No significant past surgical history     HPI:  78 yo male pmx of hemorrhagic CVA, h/o amyloid angiopathy, dementia, HTN, candidal stomatitis, rosacea, blepharitis, chronic constipation, vitamin D deficiency, insomnia, neuralgia, depression, post herpetic neurologia who presents to the ED with a cc of increasing confusion.   Recently admitted to [facility] from - after pt was found to have new small amount of hemorrhage in the right posterior frontal parasagittal region.  No acute intervention was preformed.  He was noted to have a progressive functional decline with global aphasia and dysphagia.  PEG was recommend family refused.  Pt was discharged to NH.  Per wife history pt has been improving during his stay at the NH.  He had progressed to being able to ambulate with a walker and assistance and was able to tolerate soft mechanical soft diet and soon to be advanced to regular per son.  Approx 1 week ago pt had a flare of his trigeminal neuralgia and since then has had a progressive decline.  They reports that he has not been able to ambulate and has not been able to swallow for the last 3-4 days.  These symptoms were similar to his last re-bleed and so they became concerned and came to the ED for further work up. (16 Sep 2019 21:26)    gi consulted for dysphagia. chart reviewed- pt failed [facility]bs back in may, family refused peg at that time. pt seen and examined. unable to provide meaningful hx. info obtained via chart and staff; dw wife briefly at bedside. per wife, pt is awaiting repeat slp eval, no vomiting no acute gi issues, undecided on FT; pt w hx of prior peg placement/removal. per dw medicine, son thinks dysphagia is r/t trigeminal neuralgia, hopes once txd that swallowing will improve, started on tegretol yesterday; for eventual repeat slp eval, family to then decide re: possible peg placement. seen by ent, no needed interventions. intermittently taking ?po meds per mar    recent vs/labs/imaging reviewed-  avss  mild leukocytosis  bld cxs ngtd  failed mbs    No Known Allergies      acetaminophen   Tablet .. 650 milliGRAM(s) Oral every 6 hours PRN  ascorbic acid 500 milliGRAM(s) Oral daily  carBAMazepine 100 milliGRAM(s) Oral two times a day  cefTRIAXone   IVPB 1000 milliGRAM(s) IV Intermittent every 24 hours  cholecalciferol 2000 Unit(s) Oral daily  dextrose 5% + lactated ringers. 1000 milliLiter(s) IV Continuous <Continuous>  enoxaparin Injectable 40 milliGRAM(s) SubCutaneous daily  hydrochlorothiazide 12.5 milliGRAM(s) Oral daily  influenza   Vaccine 0.5 milliLiter(s) IntraMuscular once  magnesium hydroxide Suspension 30 milliLiter(s) Oral daily PRN  memantine 10 milliGRAM(s) Oral daily  pregabalin 50 milliGRAM(s) Oral three times a day  traZODone 50 milliGRAM(s) Oral at bedtime        FAMILY HISTORY:  No pertinent family history in first degree relatives        Review of Systems:    unable to obtain    Relevant Family History:   n/c    Relevant Social History: n/c      Physical Exam:    Vital Signs:  Vital Signs Last 24 Hrs  T(C): 36.4 (20 Sep 2019 12:44), Max: 36.6 (19 Sep 2019 21:03)  T(F): 97.6 (20 Sep 2019 12:44), Max: 97.8 (19 Sep 2019 21:03)  HR: 98 (20 Sep 2019 12:44) (97 - 105)  BP: 151/98 (20 Sep 2019 12:44) (148/79 - 151/98)  BP(mean): --  RR: 18 (20 Sep 2019 12:44) (16 - 18)  SpO2: 99% (20 Sep 2019 12:44) (97% - 99%)  Daily     Daily Weight in k.4 (20 Sep 2019 12:43)    General:  nad  HEENT:  NC/AT  Chest:   dec bs  Cardiovascular:  Regular rhythm, S1, S2  Abdomen:  Soft, non-tender, non-distended, +scar from prior peg site  Extremities:  no  edema  Skin:  No rash  Neuro/Psych:  Awake alert    Laboratory:                            13.8   13.57 )-----------( 224      ( 20 Sep 2019 07:39 )             39.9     -    139  |  104  |  8   ----------------------------<  92  4.4   |  26  |  0.77    Ca    8.6      20 Sep 2019 07:39  Phos  2.7     -  Mg     2.2                   Imaging: no abd imaging

## 2019-09-20 NOTE — PROGRESS NOTE ADULT - PROBLEM SELECTOR PLAN 3
acute metabolic encephalopathy 2/2 UTI infectious gram neg; improving  unknown baseline mental status however pt has baseline cognitive deficits from previous CVAs and dementia  -Dementia stable - continue Namenda

## 2019-09-20 NOTE — PROGRESS NOTE ADULT - PROBLEM SELECTOR PLAN 1
S&S recommended alternative to PO nutrition  -Repeat MBS(9/20) failed, a PO diet could not be recommended given the patient did not elicit a pharyngeal swallow and barium had to be removed from the oral cavity via Yankauer suctioning  -MBS test failed(9/19) as oral bolus remained in anterior oral cavity. The pharyngeal phase of the swallow could not be formally assessed under fluoroscopy given the severity of the oral phase deficits.  - As per RN and son, ENT Dr. Hill stated that pt had decreased patency in throat, but should be enough to allow passage of food and liquids.  -apprec neuro recs for trigeminal neuralgia tx  - discussed with family. Son understands that if pt fails MBS today, possibility of PEG tube will have to be reassessed.  - Will NEED to edit MOLST form if pt gets PEG tube. As of now, it is checked off as NO tube feeding.  - as per family, pt has had PEG tube in past and was advanced to oral nutrition  - per chart review pt was being given comfort feeds   - dysphagia is likely 2/2 past CVAs and progressive deconditioning due to chronic disease state  - Continue NPO diet for now  - aspiration precautions  - gentle IV hydration while NPO S&S recommended alternative to PO nutrition  -Repeat MBS(9/20) failed, a PO diet could not be recommended given the patient did not elicit a pharyngeal swallow and barium had to be removed from the oral cavity via Yankauer suctioning  -MBS test failed(9/19) as oral bolus remained in anterior oral cavity. The pharyngeal phase of the swallow could not be formally assessed under fluoroscopy given the severity of the oral phase deficits.  - As per RN and son, ENT Dr. Hill stated that pt had decreased patency in throat, but should be enough to allow passage of food and liquids.  -apprec neuro recs for trigeminal neuralgia tx  - discussed with family. Son understands that if pt fails MBS today, possibility of PEG tube will have to be reassessed.  - Will NEED to edit MOLST form if pt gets PEG tube. As of now, it is checked off as NO tube feeding.  - as per family, pt has had PEG tube in past and was advanced to oral nutrition  - per chart review pt was being given comfort feeds   - dysphagia is likely 2/2 past CVAs and progressive deconditioning due to chronic disease state  - Continue NPO diet for now  - aspiration precautions  - gentle IV hydration while NPO  - Please communicate w/ son if any changes happen([phone])  -GI consulted

## 2019-09-20 NOTE — PROGRESS NOTE ADULT - ASSESSMENT
78 yo male pmx of hemorrhagic CVA, h/o amyloid angiopathy, dementia, HTN, candidal stomatitis, rosacea, blepharitis,chronic constipation, vitamin D deficiency, insomnia, neuralgia, depression, post herpetic neurologia, acute severe protein caloric malnutrition who presents to the ED with a cc of increasing confusion. Found with + UA and admitted with acute metabolic encephalopathy 2/2 urosepsis and now dysphagia sec to trigeminal neuralgia exacerbation.

## 2019-09-20 NOTE — PROGRESS NOTE ADULT - PROBLEM SELECTOR PLAN 1
dementia  cva  dysphagia  GOC documented - pt is DNR DNI  on IVF  NPO status  supportive medical regimen and care and assist with ADL  vs and meds and labs reviewed  son and family are in the process of deciding direction of care - informed of prognosis and options

## 2019-09-20 NOTE — PROGRESS NOTE ADULT - ATTENDING COMMENTS
78 yo male pmx of hemorrhagic CVA, h/o amyloid angiopathy, dementia, HTN, candidal stomatitis, rosacea, blepharitis,chronic constipation, vitamin D deficiency, insomnia, neuralgia, depression, post herpetic neurologia, acute severe protein caloric malnutrition who presents to the ED with a cc of increasing confusion. Found with + UA and admitted with acute metabolic encephalopathy 2/2 urosepsis and now dysphagia sec to trigeminal neuralgia exacerbation. Plan: apprec s/s eval, apprec GI recs, ng tube vs peg as failed mbs, cont to treat trigeminal neuralgia, update molst if son/hcp/caretaker as well as wife agrees to PEG, monitro clinical course

## 2019-09-20 NOTE — PROGRESS NOTE ADULT - PROBLEM SELECTOR PLAN 10
Stable - continue trazodone    Problem/Plan 11   IMPROVE VTE Individual Risk Assessment        RISK                                                          Points  [  ] Previous VTE                                                3  [  ] Thrombophilia                                             2  [ x ] Lower limb paralysis                                   2        (unable to hold up >15 seconds)    [  ] Current Cancer                                             2         (within 6 months)  [ x ] Immobilization > 24 hrs                              1  [  ] ICU/CCU stay > 24 hours                             1  [ x ] Age > 60                                                         1  IMPROVE VTE Score: 4  DVT ppx: lovenox  Continue vitamins

## 2019-09-20 NOTE — SWALLOW BEDSIDE ASSESSMENT ADULT - SWALLOW EVAL: RECOMMENDED DIET
1. Oral nutrition/hydration is contraindicated at this time. 2. Consider short-term vs long-term non-oral means of nutrition/hydration as the patient is an increased nutritional and aspiration risk at this time. 1. Oral nutrition/hydration is contraindicated at this time. 2. Consider short-term vs long-term non-oral means of nutrition/hydration as the patient is an increased nutritional and aspiration risk at this time. 3. Discuss the goals of care regarding the nutritional plan of care with the patient's caregivers.

## 2019-09-20 NOTE — PROGRESS NOTE ADULT - PROBLEM SELECTOR PLAN 4
-Pt severely malnourished, Temporal, clavicular, and shoulder muscle wasting present, BMI 19.7  -dysphagia is likely 2/2 past CVAs and progressive deconditioning due to chronic disease state  -discussion w/ family on PEG tube -Pt severely malnourished, Temporal, clavicular, and shoulder muscle wasting present, BMI 19.7  -dysphagia is likely 2/2 past CVAs and progressive deconditioning due to chronic disease state  -discussion w/ family on PEG tube  -GI consulted

## 2019-09-20 NOTE — CONSULT NOTE ADULT - PROBLEM SELECTOR RECOMMENDATION 9
cva, dysphagia, atelectasis, frail and weak  swallow eval noted - dysphagia diet recs  HOB elev  asp prec  assist with ADL  on Rocephin for UTI - urine cx noted -   labs and imaging reviewed  CM eval noted  pt is DNR DNI  recent hospital stay at [facility]  will follow and monitor  prognosis remains guarded -
multifactorial  failed mbs  npo/ivf  correct elytes prn  started on tegretol per neuro  for eventual repeat slp eval  consider ngt feeds in interim  aspiration/gerd prec, elevate hob  pall care following to further delineate goc  may need peg pending above  will follow

## 2019-09-20 NOTE — PROGRESS NOTE ADULT - SUBJECTIVE AND OBJECTIVE BOX
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 1000 milliLiter(s) (75 mL/Hr) IV Continuous <Continuous>  memantine 10 milliGRAM(s) Oral daily  pregabalin 50 milliGRAM(s) Oral three times a day  traZODone 50 milliGRAM(s) Oral at bedtime    MEDICATIONS  (PRN):  acetaminophen   Tablet .. 650 milliGRAM(s) Oral every 6 hours PRN Temp greater or equal to 38C (100.4F)  magnesium hydroxide Suspension 30 milliLiter(s) Oral daily PRN Constipation         Vitals log        ICU Vital Signs Last 24 Hrs  T(C): 36.4 (20 Sep 2019 04:34), Max: 36.6 (19 Sep 2019 21:03)  T(F): 97.6 (20 Sep 2019 04:34), Max: 97.8 (19 Sep 2019 21:03)  HR: 105 (20 Sep 2019 04:34) (96 - 105)  BP: 150/81 (20 Sep 2019 04:34) (148/79 - 150/85)  BP(mean): --  ABP: --  ABP(mean): --  RR: 18 (20 Sep 2019 04:34) (16 - 18)  SpO2: 98% (20 Sep 2019 04:34) (97% - 98%)           Input and Output:  I&O's Detail    18 Sep 2019 07:01  -  19 Sep 2019 07:00  --------------------------------------------------------  IN:    sodium chloride 0.9%: 780 mL    Solution: 50 mL  Total IN: 830 mL    OUT:  Total OUT: 0 mL    Total NET: 830 mL      19 Sep 2019 07:01  -  20 Sep 2019 06:26  --------------------------------------------------------  IN:    lactated ringers.: 1050 mL    sodium chloride 0.9%: 585 mL    Solution: 50 mL  Total IN: 1685 mL    OUT:  Total OUT: 0 mL    Total NET: 1685 mL          Lab Data                        13.8   11.05 )-----------( 220      ( 19 Sep 2019 09:40 )             40.2     09-19    141  |  107  |  9   ----------------------------<  89  3.6   |  26  |  0.71    Ca    8.0<L>      19 Sep 2019 09:40  Phos  1.9     09-18  Mg     2.1     09-18              Review of Systems	      Objective     Physical Examination    heart s1s2  lung dec BS  abd soft      Pertinent Lab findings & Imaging      Angel:  NO   Adequate UO     I&O's Detail    18 Sep 2019 07:01  -  19 Sep 2019 07:00  --------------------------------------------------------  IN:    sodium chloride 0.9%: 780 mL    Solution: 50 mL  Total IN: 830 mL    OUT:  Total OUT: 0 mL    Total NET: 830 mL      19 Sep 2019 07:01  -  20 Sep 2019 06:26  --------------------------------------------------------  IN:    lactated ringers.: 1050 mL    sodium chloride 0.9%: 585 mL    Solution: 50 mL  Total IN: 1685 mL    OUT:  Total OUT: 0 mL    Total NET: 1685 mL               Discussed with:     Cultures:	        Radiology

## 2019-09-20 NOTE — PROGRESS NOTE ADULT - SUBJECTIVE AND OBJECTIVE BOX
Neurology follow up note    HANSEN [id number]      Interval History:    Patient awake in bed     MEDICATIONS    acetaminophen   Tablet .. 650 milliGRAM(s) Oral every 6 hours PRN  ascorbic acid 500 milliGRAM(s) Oral daily  carBAMazepine 100 milliGRAM(s) Oral two times a day  cefTRIAXone   IVPB 1000 milliGRAM(s) IV Intermittent every 24 hours  cholecalciferol 2000 Unit(s) Oral daily  dextrose 5% + lactated ringers. 1000 milliLiter(s) IV Continuous <Continuous>  enoxaparin Injectable 40 milliGRAM(s) SubCutaneous daily  hydrochlorothiazide 12.5 milliGRAM(s) Oral daily  influenza   Vaccine 0.5 milliLiter(s) IntraMuscular once  magnesium hydroxide Suspension 30 milliLiter(s) Oral daily PRN  memantine 10 milliGRAM(s) Oral daily  pregabalin 50 milliGRAM(s) Oral three times a day  traZODone 50 milliGRAM(s) Oral at bedtime      Allergies    No Known Allergies    Intolerances            Vital Signs Last 24 Hrs  T(C): 36.4 (20 Sep 2019 12:44), Max: 36.6 (19 Sep 2019 21:03)  T(F): 97.6 (20 Sep 2019 12:44), Max: 97.8 (19 Sep 2019 21:03)  HR: 98 (20 Sep 2019 12:44) (97 - 105)  BP: 151/98 (20 Sep 2019 12:44) (148/79 - 151/98)  BP(mean): --  RR: 18 (20 Sep 2019 12:44) (16 - 18)  SpO2: 99% (20 Sep 2019 12:44) (97% - 99%)      REVIEW OF SYSTEMS: Non Verbal    On Neurological Examination:    Mental Status - Patient is alert, awake    Extraocular movements appeared to be intact.      Pupils bilaterally 2 mm reactive to 1 mm.      Speech nonverbal.      Motor:  Bilateral upper; attempted to elevate arm, the patient would actively resist, pull both arms down, would say 4/5, bilateral lower extremities with plantar stimulation and flexation of hip and knee, would say 3+/5.  Sensory:  Unreliable.    Muscle tone - is normal all over.  No asymmetry is seen.      GENERAL Exam: Nontoxic , No Acute Distress   	  HEENT:  normocephalic, atraumatic  		  LUNGS:  Decreased bilaterally  	  HEART: Normal S1S2   No murmur RRR        	  GI/ ABDOMEN:  Soft  Non tender    EXTREMITIES:   No Edema  No Clubbing  No Cyanosis   	   SKIN: Normal  No Ecchymosis               LABS:  CBC Full  -  ( 20 Sep 2019 07:39 )  WBC Count : 13.57 K/uL  RBC Count : 4.54 M/uL  Hemoglobin : 13.8 g/dL  Hematocrit : 39.9 %  Platelet Count - Automated : 224 K/uL  Mean Cell Volume : 87.9 fl  Mean Cell Hemoglobin : 30.4 pg  Mean Cell Hemoglobin Concentration : 34.6 gm/dL  Auto Neutrophil # : x  Auto Lymphocyte # : x  Auto Monocyte # : x  Auto Eosinophil # : x  Auto Basophil # : x  Auto Neutrophil % : x  Auto Lymphocyte % : x  Auto Monocyte % : x  Auto Eosinophil % : x  Auto Basophil % : x      09-20    139  |  104  |  8   ----------------------------<  92  4.4   |  26  |  0.77    Ca    8.6      20 Sep 2019 07:39  Phos  2.7     09-20  Mg     2.2     09-20      Hemoglobin A1C:       Vitamin B12         RADIOLOGY      ANALYSIS AND PLAN:  An 88-year-old with episodes of change in mental status, history of intracerebral hemorrhage, dementia, and trigeminal neuralgia.  1.	For episode of change in mental status, possibly suspect multifactorial secondary to underlying infectious type process, UTI, and poor oral intake.  2.	Would recommend antibiotics as needed.  3.	For history of intracerebral hemorrhage, would recommend supportive therapy.  Repeat CAT scan does not show any new signs of intracerebral hemorrhage, limit the use of blood-thinning medications if possible.  4.	For history of dementia, continue the patient on Namenda 10 mg once a day.  5.	For history of trigeminal neuralgia, the patient is on Lyrica, I will add Tegretol to see if that will help.  6.	Aspiration precautions.  7.	Spoke with the son, Geovanni, at the bedside, telephone number [phone]. 9/20/19    Greater than 45 minutes spent in direct patient care reviewing  the notes, lab data/ imaging , discussion with multidisciplinary team.

## 2019-09-20 NOTE — PROGRESS NOTE ADULT - SUBJECTIVE AND OBJECTIVE BOX
Patient is a 79y old  Male who presents with a chief complaint of AMS/uti (20 Sep 2019 13:41)      INTERVAL HPI: Patient was seen and examined at bedside. Pt is unresponsive at baseline and on today's examination and I was unable to illicit a ROS. Will NEED to edit MOLST form if pt gets PEG tube. As of now, MOLST form in chart has NO tube feeding checked off.  OVERNIGHT EVENTS: No Acute Overnight events.   T(F): 97.6 (09-20-19 @ 12:44), Max: 97.8 (09-19-19 @ 21:03)  HR: 98 (09-20-19 @ 12:44) (97 - 105)  BP: 151/98 (09-20-19 @ 12:44) (148/79 - 151/98)  RR: 18 (09-20-19 @ 12:44) (16 - 18)  SpO2: 99% (09-20-19 @ 12:44) (97% - 99%)  I&O's Summary    19 Sep 2019 07:01  -  20 Sep 2019 07:00  --------------------------------------------------------  IN: 1685 mL / OUT: 0 mL / NET: 1685 mL    20 Sep 2019 07:01  -  20 Sep 2019 15:25  --------------------------------------------------------  IN: 600 mL / OUT: 0 mL / NET: 600 mL        REVIEW OF SYSTEMS:  unable to obtain 2/2 mental status      PHYSICAL EXAM:  GENERAL: NAD, frail, chronically ill appearing, unresponsive and uncooperative for physical exam.   HEAD:  Atraumatic, Normocephalic  NERVOUS SYSTEM:  Alert & Oriented X0, Awake, doesn't follow commands  CHEST/LUNG: Decreased lung sounds d/t poor inspiratory effort  HEART: Regular rate and rhythm; No murmurs, rubs, or gallops  ABDOMEN: Soft, Nontender, Nondistended; Bowel sounds present  EXTREMITIES:  2+ Peripheral Pulses, No clubbing, cyanosis, or edema  SKIN: Sacral stage 1 decubitus ulcer L poa. Temporal, clavicle, and shoulder muscle wasting.    LABS:                        13.8   13.57 )-----------( 224      ( 20 Sep 2019 07:39 )             39.9     09-20    139  |  104  |  8   ----------------------------<  92  4.4   |  26  |  0.77    Ca    8.6      20 Sep 2019 07:39  Phos  2.7     09-20  Mg     2.2     09-20          CAPILLARY BLOOD GLUCOSE          09-16 @ 21:11   No growth to date.  --  --  09-16 @ 21:08   No growth to date.  --  --  09-16 @ 21:07   >100,000 CFU/ml Proteus mirabilis  --  Proteus mirabilis          MEDICATIONS  (STANDING):  ascorbic acid 500 milliGRAM(s) Oral daily  carBAMazepine 100 milliGRAM(s) Oral two times a day  cefTRIAXone   IVPB 1000 milliGRAM(s) IV Intermittent every 24 hours  cholecalciferol 2000 Unit(s) Oral daily  dextrose 5% + lactated ringers. 1000 milliLiter(s) (75 mL/Hr) IV Continuous <Continuous>  enoxaparin Injectable 40 milliGRAM(s) SubCutaneous daily  hydrochlorothiazide 12.5 milliGRAM(s) Oral daily  influenza   Vaccine 0.5 milliLiter(s) IntraMuscular once  memantine 10 milliGRAM(s) Oral daily  pregabalin 50 milliGRAM(s) Oral three times a day  traZODone 50 milliGRAM(s) Oral at bedtime    MEDICATIONS  (PRN):  acetaminophen   Tablet .. 650 milliGRAM(s) Oral every 6 hours PRN Temp greater or equal to 38C (100.4F)  magnesium hydroxide Suspension 30 milliLiter(s) Oral daily PRN Constipation

## 2019-09-20 NOTE — PROGRESS NOTE ADULT - PROBLEM SELECTOR PLAN 7
acute on Chronic, trigeminal   lyrica increased to 50mg tid; start acute on Chronic, trigeminal   lyrica increased to 50mg tid;   Cont Tegretol

## 2019-09-21 LAB
ANION GAP SERPL CALC-SCNC: 9 MMOL/L — SIGNIFICANT CHANGE UP (ref 5–17)
BASOPHILS # BLD AUTO: 0.03 K/UL — SIGNIFICANT CHANGE UP (ref 0–0.2)
BASOPHILS NFR BLD AUTO: 0.3 % — SIGNIFICANT CHANGE UP (ref 0–2)
BUN SERPL-MCNC: 5 MG/DL — LOW (ref 7–23)
CALCIUM SERPL-MCNC: 8.4 MG/DL — LOW (ref 8.5–10.1)
CHLORIDE SERPL-SCNC: 104 MMOL/L — SIGNIFICANT CHANGE UP (ref 96–108)
CO2 SERPL-SCNC: 29 MMOL/L — SIGNIFICANT CHANGE UP (ref 22–31)
CREAT SERPL-MCNC: 0.86 MG/DL — SIGNIFICANT CHANGE UP (ref 0.5–1.3)
CULTURE RESULTS: SIGNIFICANT CHANGE UP
CULTURE RESULTS: SIGNIFICANT CHANGE UP
EOSINOPHIL # BLD AUTO: 0.27 K/UL — SIGNIFICANT CHANGE UP (ref 0–0.5)
EOSINOPHIL NFR BLD AUTO: 2.7 % — SIGNIFICANT CHANGE UP (ref 0–6)
GLUCOSE SERPL-MCNC: 113 MG/DL — HIGH (ref 70–99)
HCT VFR BLD CALC: 38.2 % — LOW (ref 39–50)
HGB BLD-MCNC: 13.2 G/DL — SIGNIFICANT CHANGE UP (ref 13–17)
IMM GRANULOCYTES NFR BLD AUTO: 0.6 % — SIGNIFICANT CHANGE UP (ref 0–1.5)
LYMPHOCYTES # BLD AUTO: 1.19 K/UL — SIGNIFICANT CHANGE UP (ref 1–3.3)
LYMPHOCYTES # BLD AUTO: 11.9 % — LOW (ref 13–44)
MAGNESIUM SERPL-MCNC: 2.2 MG/DL — SIGNIFICANT CHANGE UP (ref 1.6–2.6)
MCHC RBC-ENTMCNC: 30.2 PG — SIGNIFICANT CHANGE UP (ref 27–34)
MCHC RBC-ENTMCNC: 34.6 GM/DL — SIGNIFICANT CHANGE UP (ref 32–36)
MCV RBC AUTO: 87.4 FL — SIGNIFICANT CHANGE UP (ref 80–100)
MONOCYTES # BLD AUTO: 0.6 K/UL — SIGNIFICANT CHANGE UP (ref 0–0.9)
MONOCYTES NFR BLD AUTO: 6 % — SIGNIFICANT CHANGE UP (ref 2–14)
NEUTROPHILS # BLD AUTO: 7.82 K/UL — HIGH (ref 1.8–7.4)
NEUTROPHILS NFR BLD AUTO: 78.5 % — HIGH (ref 43–77)
NRBC # BLD: 0 /100 WBCS — SIGNIFICANT CHANGE UP (ref 0–0)
PHOSPHATE SERPL-MCNC: 3 MG/DL — SIGNIFICANT CHANGE UP (ref 2.5–4.5)
PLATELET # BLD AUTO: 234 K/UL — SIGNIFICANT CHANGE UP (ref 150–400)
POTASSIUM SERPL-MCNC: 3.6 MMOL/L — SIGNIFICANT CHANGE UP (ref 3.5–5.3)
POTASSIUM SERPL-SCNC: 3.6 MMOL/L — SIGNIFICANT CHANGE UP (ref 3.5–5.3)
RBC # BLD: 4.37 M/UL — SIGNIFICANT CHANGE UP (ref 4.2–5.8)
RBC # FLD: 12 % — SIGNIFICANT CHANGE UP (ref 10.3–14.5)
SODIUM SERPL-SCNC: 142 MMOL/L — SIGNIFICANT CHANGE UP (ref 135–145)
SPECIMEN SOURCE: SIGNIFICANT CHANGE UP
SPECIMEN SOURCE: SIGNIFICANT CHANGE UP
WBC # BLD: 9.97 K/UL — SIGNIFICANT CHANGE UP (ref 3.8–10.5)
WBC # FLD AUTO: 9.97 K/UL — SIGNIFICANT CHANGE UP (ref 3.8–10.5)

## 2019-09-21 PROCEDURE — 99233 SBSQ HOSP IP/OBS HIGH 50: CPT

## 2019-09-21 RX ORDER — SODIUM CHLORIDE 9 MG/ML
1000 INJECTION, SOLUTION INTRAVENOUS
Refills: 0 | Status: DISCONTINUED | OUTPATIENT
Start: 2019-09-21 | End: 2019-09-27

## 2019-09-21 RX ORDER — CARBAMAZEPINE 200 MG
100 TABLET ORAL ONCE
Refills: 0 | Status: COMPLETED | OUTPATIENT
Start: 2019-09-21 | End: 2019-09-21

## 2019-09-21 RX ADMIN — Medication 500 MILLIGRAM(S): at 11:02

## 2019-09-21 RX ADMIN — Medication 100 MILLIGRAM(S): at 23:26

## 2019-09-21 RX ADMIN — Medication 50 MILLIGRAM(S): at 21:20

## 2019-09-21 RX ADMIN — Medication 50 MILLIGRAM(S): at 11:02

## 2019-09-21 RX ADMIN — Medication 2000 UNIT(S): at 11:01

## 2019-09-21 RX ADMIN — MEMANTINE HYDROCHLORIDE 10 MILLIGRAM(S): 10 TABLET ORAL at 11:01

## 2019-09-21 RX ADMIN — SODIUM CHLORIDE 75 MILLILITER(S): 9 INJECTION, SOLUTION INTRAVENOUS at 01:38

## 2019-09-21 RX ADMIN — CEFTRIAXONE 100 MILLIGRAM(S): 500 INJECTION, POWDER, FOR SOLUTION INTRAMUSCULAR; INTRAVENOUS at 21:20

## 2019-09-21 RX ADMIN — Medication 100 MILLIGRAM(S): at 11:09

## 2019-09-21 RX ADMIN — ENOXAPARIN SODIUM 40 MILLIGRAM(S): 100 INJECTION SUBCUTANEOUS at 11:02

## 2019-09-21 NOTE — PROGRESS NOTE ADULT - PROBLEM SELECTOR PLAN 2
Sepsis 2/2 UTI  - continue Rocephin. As per ID, if pt can tolerate po, change to Vantin to complete course  -leukocytosis trending upward- wbc- 10.43->11.05->13.57  - tylenol prn for fevers  urine cx shows gram neg rods, multi-drug resistant  f/u blood cx

## 2019-09-21 NOTE — PROGRESS NOTE ADULT - PROBLEM SELECTOR PLAN 1
unable to complete MBS  NPO  oral and skin care  on ABX for UTI  urine cx noted  serial labs  serial clinical exam  assist with ADL  pt is DNR DNI  CVA  frailty  dementia  prognosis POOR  GOC discussion ongoing - limited options - placement

## 2019-09-21 NOTE — PROGRESS NOTE ADULT - PROBLEM SELECTOR PLAN 4
-Pt severely malnourished, Temporal, clavicular, and shoulder muscle wasting present, BMI 19.7  -dysphagia is likely 2/2 past CVAs and progressive deconditioning due to chronic disease state  -discussion w/ family on PEG tube  -GI consulted

## 2019-09-21 NOTE — PROVIDER CONTACT NOTE (OTHER) - BACKGROUND
Pt is NPO excerpt meds, PF held at bedside this morning with son and spouse and they were advised that pt is still NPO. Pt took meds from wife this morning in apple sauce.

## 2019-09-21 NOTE — PROGRESS NOTE ADULT - SUBJECTIVE AND OBJECTIVE BOX
Patient is a 79y old  Male who presents with a chief complaint of AMS/uti (21 Sep 2019 11:23)      INTERVAL HPI: Pt seen and examined. Son and wife at bedside, state they feel he still can swallow and just need more reinforcement for family. When discussing option of ng tube for feed and meds son says patient may pull out NG tube, when offered restraints son and wife refuse on behalf of patient, pt son and wife still amendable to PEG, GI PA came to bedside and agrees with current plan of poss PEG if mbs on monday unsuccessful, neuro also arrived to bedside who believes trigeminal neuralgia exacerbation may be cause of dysphagia but there could be a component of advancing dementia in hospital setting. Pt son and wife understand. Request RN attempt with wife at bedside. Pt chronically nonverbal and as per family does not follow commands.     OVERNIGHT EVENTS: none noted  T(F): 98.3 (09-21-19 @ 04:46), Max: 98.3 (09-21-19 @ 04:46)  HR: 87 (09-21-19 @ 04:46) (87 - 94)  BP: 128/82 (09-21-19 @ 04:46) (128/82 - 148/88)  RR: 18 (09-21-19 @ 04:46) (18 - 18)  SpO2: 95% (09-21-19 @ 04:46) (95% - 100%)  I&O's Summary    20 Sep 2019 07:01  -  21 Sep 2019 07:00  --------------------------------------------------------  IN: 1725 mL / OUT: 1 mL / NET: 1724 mL        REVIEW OF SYSTEMS: unable as pt nonverbal since prev stroke    PHYSICAL EXAM:  GENERAL: NAD, elder, chronically ill appearing, severe protein caloric malnutrition  NERVOUS SYSTEM:  Alert & Oriented X1,  Motor Strength 3/5 B/L upper and lower extremities  CHEST/LUNG: Clear to percussion bilaterally; No rales, rhonchi, wheezing, or rubs  HEART: Regular rate and rhythm; No murmurs, rubs, or gallops  ABDOMEN: Soft, Nontender, Nondistended; Bowel sounds present  EXTREMITIES:  2+ Peripheral Pulses, No clubbing, cyanosis, or edema except thin extremiteis  SKIN: No rashes or lesions    LABS:                        13.2   9.97  )-----------( 234      ( 21 Sep 2019 07:26 )             38.2     09-21    142  |  104  |  5<L>  ----------------------------<  113<H>  3.6   |  29  |  0.86    Ca    8.4<L>      21 Sep 2019 07:26  Phos  3.0     09-21  Mg     2.2     09-21          CAPILLARY BLOOD GLUCOSE                  MEDICATIONS  (STANDING):  ascorbic acid 500 milliGRAM(s) Oral daily  carBAMazepine 100 milliGRAM(s) Oral two times a day  cefTRIAXone   IVPB 1000 milliGRAM(s) IV Intermittent every 24 hours  cholecalciferol 2000 Unit(s) Oral daily  dextrose 5% + lactated ringers. 1000 milliLiter(s) (75 mL/Hr) IV Continuous <Continuous>  enoxaparin Injectable 40 milliGRAM(s) SubCutaneous daily  hydrochlorothiazide 12.5 milliGRAM(s) Oral daily  influenza   Vaccine 0.5 milliLiter(s) IntraMuscular once  memantine 10 milliGRAM(s) Oral daily  pregabalin 50 milliGRAM(s) Oral three times a day  traZODone 50 milliGRAM(s) Oral at bedtime    MEDICATIONS  (PRN):  acetaminophen   Tablet .. 650 milliGRAM(s) Oral every 6 hours PRN Temp greater or equal to 38C (100.4F)  magnesium hydroxide Suspension 30 milliLiter(s) Oral daily PRN Constipation

## 2019-09-21 NOTE — PROVIDER CONTACT NOTE (OTHER) - RECOMMENDATIONS
Spouse was educated again about pt remaining NPO excerpt meds pending speech and swallow  evaluation.

## 2019-09-21 NOTE — PROGRESS NOTE ADULT - SUBJECTIVE AND OBJECTIVE BOX
JADE VILLALOBOS is a 79yMale , patient examined and chart reviewed.    INTERVAL HPI/ OVERNIGHT EVENTS:   No events. afebrile.     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    ROS:  Unable to obtain due to : Pt' condition    Current inpatient medications :    ANTIBIOTICS/RELEVANT:  cefTRIAXone   IVPB 1000 milliGRAM(s) IV Intermittent every 24 hours    acetaminophen   Tablet .. 650 milliGRAM(s) Oral every 6 hours PRN  ascorbic acid 500 milliGRAM(s) Oral daily  carBAMazepine 100 milliGRAM(s) Oral two times a day  cholecalciferol 2000 Unit(s) Oral daily  dextrose 5% + lactated ringers. 1000 milliLiter(s) IV Continuous <Continuous>  enoxaparin Injectable 40 milliGRAM(s) SubCutaneous daily  hydrochlorothiazide 12.5 milliGRAM(s) Oral daily  magnesium hydroxide Suspension 30 milliLiter(s) Oral daily PRN  memantine 10 milliGRAM(s) Oral daily  pregabalin 50 milliGRAM(s) Oral three times a day  traZODone 50 milliGRAM(s) Oral at bedtime      Objective:  Vital Signs Last 24 Hrs  T(C): 36.8 (21 Sep 2019 13:01), Max: 36.8 (21 Sep 2019 04:46)  T(F): 98.2 (21 Sep 2019 13:01), Max: 98.3 (21 Sep 2019 04:46)  HR: 75 (21 Sep 2019 13:01) (75 - 94)  BP: 104/70 (21 Sep 2019 13:01) (104/70 - 148/88)  RR: 18 (21 Sep 2019 13:01) (18 - 18)  SpO2: 97% (21 Sep 2019 13:01) (95% - 100%)    Physical Exam:  General: no acute distress  Eyes: sclera anicteric, pupils equal and reactive to light  ENMT: buccal mucosa moist, pharynx not injected  Neck: supple, trachea midline  Lungs: Decreased, no wheeze/rhonchi  Cardiovascular: regular rate and rhythm, S1 S2  Abdomen: soft, nontender, no organomegaly present, bowel sounds normal  Neurological: awake confused  Skin: no increased ecchymosis/petechiae/purpura  Lymph Nodes: no palpable cervical/supraclavicular lymph nodes enlargements  Extremities: no cyanosis/clubbing/edema      LABS:                        13.2   9.97  )-----------( 234      ( 21 Sep 2019 07:26 )             38.2   09-21    142  |  104  |  5<L>  ----------------------------<  113<H>  3.6   |  29  |  0.86    Ca    8.4<L>      21 Sep 2019 07:26  Phos  3.0     09-21  Mg     2.2     09-21    MICROBIOLOGY:    Culture - Blood (collected 16 Sep 2019 21:11)  Source: .Blood Blood-Venous  Preliminary Report (17 Sep 2019 22:01):    No growth to date.    Culture - Blood (collected 16 Sep 2019 21:08)  Source: .Blood Blood-Venous  Preliminary Report (17 Sep 2019 22:01):    No growth to date.    Culture - Urine (collected 16 Sep 2019 21:07)  Source: .Urine Clean Catch (Midstream)  Final Report (18 Sep 2019 18:48):    >100,000 CFU/ml Proteus mirabilis  Organism: Proteus mirabilis (18 Sep 2019 18:48)  Organism: Proteus mirabilis (18 Sep 2019 18:48)      -  Amikacin: S <=16      -  Ampicillin: S <=8 These ampicillin results predict results for amoxicillin      -  Ampicillin/Sulbactam: S <=8/4 Enterobacter, Citrobacter, and Serratia may develop resistance during prolonged therapy (3-4 days)      -  Aztreonam: S <=4      -  Cefazolin: S <=8 (MIC_CL_COM_ENTERIC_[id number]) For uncomplicated UTI with K. pneumoniae, E. coli, or P. mirablis: SELENA <=16 is sensitive and SELENA >=32 is resistant. This also predicts results for oral agents cefaclor, cefdinir, cefpodoxime, cefprozil, cefuroxime axetil, cephalexin and locarbef for uncomplicated UTI. Note that some isolates may be susceptible to these agents while testing resistant to cefazolin.      -  Cefepime: S <=4      -  Cefoxitin: S <=8      -  Ceftriaxone: S <=1 Enterobacter, Citrobacter, and Serratia may develop resistance during prolonged therapy      -  Ciprofloxacin: R >2      -  Gentamicin: S <=4      -  Levofloxacin: R >4      -  Meropenem: S <=1      -  Nitrofurantoin: R >64 Should not be used to treat pyelonephritis      -  Piperacillin/Tazobactam: S <=16      -  Tobramycin: S <=4      -  Trimethoprim/Sulfamethoxazole: S <=2/38      Method Type: SELENA    RADIOLOGY & ADDITIONAL STUDIES:    EXAM:  XR CHEST PORTABLE URGENT 1V                            PROCEDURE DATE:  09/16/2019          INTERPRETATION:  XR CHEST URGENT    Single AP view    HISTORY:  increased weakness    Comparison: Chest x-ray 5/20/2019      The cardiac silhouette is within normal limits. The lungs are clear. No   pleural abnormality.    IMPRESSION: Normal AP chest.      Assessment :   80 yo male pmx of hemorrhagic CVA on recent admission to [facility] from 5/20-5/23 , h/o amyloid angiopathy, HTN, trigeminal neuralgia, depression, dementia, admitted with mental status change sec UTI with Proteus Also with progressive functional decline with global aphasia and dysphagia. High aspiration risk. Failed MBS. Family had refused PEG in the past. WBC resolved. Overall stable.    Plan :   Cont Rocephin day 6/7  NPO  Trend temps and wbc  Repeat swallow study  Asp precautions    D/w Dr Conklin    Continue with present regiment.  Appropriate use of antibiotics and adverse effects reviewed.      I have discussed the above plan of care with patient/ family in detail. They expressed understanding of the  treatment plan . Risks, benefits and alternatives discussed in detail. I have asked if they have any questions or concerns and appropriately addressed them to the best of my ability .    > 35 minutes were spent in direct patient care reviewing notes, medications ,labs data/ imaging , discussion with multidisciplinary team.    Thank you for allowing me to participate in care of your patient .    Gerardo Mercedes MD  Infectious Disease  [phone]

## 2019-09-21 NOTE — PROGRESS NOTE ADULT - SUBJECTIVE AND OBJECTIVE BOX
Neurology follow up note    HANSEN [id number]      Interval History:    Patient seen with family     MEDICATIONS    acetaminophen   Tablet .. 650 milliGRAM(s) Oral every 6 hours PRN  ascorbic acid 500 milliGRAM(s) Oral daily  carBAMazepine 100 milliGRAM(s) Oral two times a day  cefTRIAXone   IVPB 1000 milliGRAM(s) IV Intermittent every 24 hours  cholecalciferol 2000 Unit(s) Oral daily  dextrose 5% + lactated ringers. 1000 milliLiter(s) IV Continuous <Continuous>  enoxaparin Injectable 40 milliGRAM(s) SubCutaneous daily  hydrochlorothiazide 12.5 milliGRAM(s) Oral daily  influenza   Vaccine 0.5 milliLiter(s) IntraMuscular once  magnesium hydroxide Suspension 30 milliLiter(s) Oral daily PRN  memantine 10 milliGRAM(s) Oral daily  pregabalin 50 milliGRAM(s) Oral three times a day  traZODone 50 milliGRAM(s) Oral at bedtime      Allergies    No Known Allergies    Intolerances            Vital Signs Last 24 Hrs  T(C): 36.8 (21 Sep 2019 04:46), Max: 36.8 (21 Sep 2019 04:46)  T(F): 98.3 (21 Sep 2019 04:46), Max: 98.3 (21 Sep 2019 04:46)  HR: 87 (21 Sep 2019 04:46) (87 - 98)  BP: 128/82 (21 Sep 2019 04:46) (128/82 - 151/98)  BP(mean): --  RR: 18 (21 Sep 2019 04:46) (18 - 18)  SpO2: 95% (21 Sep 2019 04:46) (95% - 100%)      REVIEW OF SYSTEMS: Non Verbal    On Neurological Examination:    Mental Status - Patient is alert, awake    Extraocular movements appeared to be intact.      Pupils bilaterally 2 mm reactive to 1 mm.      Speech nonverbal.      Motor:  Bilateral upper; attempted to elevate arm, the patient would actively resist, pull both arms down, would say 4/5, bilateral lower extremities with plantar stimulation and flexation of hip and knee, would say 3+/5.  Sensory:  Unreliable.    Muscle tone - is normal all over.  No asymmetry is seen.      GENERAL Exam: Nontoxic , No Acute Distress   	  HEENT:  normocephalic, atraumatic  		  LUNGS:  Decreased bilaterally  	  HEART: Normal S1S2   No murmur RRR        	  GI/ ABDOMEN:  Soft  Non tender    EXTREMITIES:   No Edema  No Clubbing  No Cyanosis   	   SKIN: Normal  No Ecchymosis               LABS:  CBC Full  -  ( 21 Sep 2019 07:26 )  WBC Count : 9.97 K/uL  RBC Count : 4.37 M/uL  Hemoglobin : 13.2 g/dL  Hematocrit : 38.2 %  Platelet Count - Automated : 234 K/uL  Mean Cell Volume : 87.4 fl  Mean Cell Hemoglobin : 30.2 pg  Mean Cell Hemoglobin Concentration : 34.6 gm/dL  Auto Neutrophil # : 7.82 K/uL  Auto Lymphocyte # : 1.19 K/uL  Auto Monocyte # : 0.60 K/uL  Auto Eosinophil # : 0.27 K/uL  Auto Basophil # : 0.03 K/uL  Auto Neutrophil % : 78.5 %  Auto Lymphocyte % : 11.9 %  Auto Monocyte % : 6.0 %  Auto Eosinophil % : 2.7 %  Auto Basophil % : 0.3 %      09-21    142  |  104  |  5<L>  ----------------------------<  113<H>  3.6   |  29  |  0.86    Ca    8.4<L>      21 Sep 2019 07:26  Phos  3.0     09-21  Mg     2.2     09-21      Hemoglobin A1C:       Vitamin B12         RADIOLOGY      ANALYSIS AND PLAN:  An 88-year-old with episodes of change in mental status, history of intracerebral hemorrhage, dementia, and trigeminal neuralgia.  1.	For episode of change in mental status, possibly suspect multifactorial secondary to underlying infectious type process, UTI, and poor oral intake.  2.	Would recommend antibiotics as needed.  3.	For history of intracerebral hemorrhage, would recommend supportive therapy.  Repeat CAT scan does not show any new signs of intracerebral hemorrhage, limit the use of blood-thinning medications if possible.  4.	For history of dementia, continue the patient on Namenda 10 mg once a day.  5.	For history of trigeminal neuralgia, the patient is on Lyrica, I will add Tegretol to see if that will help.  6.	Aspiration precautions.  7.	Spoke with the son, Geovanni, at the bedside, telephone number [phone]. 9/21/19  8.	possible PEG plaement     Greater than 40 minutes spent in direct patient care reviewing  the notes, lab data/ imaging , discussion with multidisciplinary team. Neurology follow up note    HANSEN [id number]      Interval History:    Patient seen with family     MEDICATIONS    acetaminophen   Tablet .. 650 milliGRAM(s) Oral every 6 hours PRN  ascorbic acid 500 milliGRAM(s) Oral daily  carBAMazepine 100 milliGRAM(s) Oral two times a day  cefTRIAXone   IVPB 1000 milliGRAM(s) IV Intermittent every 24 hours  cholecalciferol 2000 Unit(s) Oral daily  dextrose 5% + lactated ringers. 1000 milliLiter(s) IV Continuous <Continuous>  enoxaparin Injectable 40 milliGRAM(s) SubCutaneous daily  hydrochlorothiazide 12.5 milliGRAM(s) Oral daily  influenza   Vaccine 0.5 milliLiter(s) IntraMuscular once  magnesium hydroxide Suspension 30 milliLiter(s) Oral daily PRN  memantine 10 milliGRAM(s) Oral daily  pregabalin 50 milliGRAM(s) Oral three times a day  traZODone 50 milliGRAM(s) Oral at bedtime      Allergies    No Known Allergies    Intolerances            Vital Signs Last 24 Hrs  T(C): 36.8 (21 Sep 2019 04:46), Max: 36.8 (21 Sep 2019 04:46)  T(F): 98.3 (21 Sep 2019 04:46), Max: 98.3 (21 Sep 2019 04:46)  HR: 87 (21 Sep 2019 04:46) (87 - 98)  BP: 128/82 (21 Sep 2019 04:46) (128/82 - 151/98)  BP(mean): --  RR: 18 (21 Sep 2019 04:46) (18 - 18)  SpO2: 95% (21 Sep 2019 04:46) (95% - 100%)      REVIEW OF SYSTEMS: Non Verbal    On Neurological Examination:    Mental Status - Patient is alert, awake    Extraocular movements appeared to be intact.      Pupils bilaterally 2 mm reactive to 1 mm.      Speech nonverbal.      Motor:  Bilateral upper; attempted to elevate arm, the patient would actively resist, pull both arms down, would say 4/5, bilateral lower extremities with plantar stimulation and flexation of hip and knee, would say 3+/5.  Sensory:  Unreliable.    Muscle tone - is normal all over.  No asymmetry is seen.      GENERAL Exam: Nontoxic , No Acute Distress   	  HEENT:  normocephalic, atraumatic  		  LUNGS:  Decreased bilaterally  	  HEART: Normal S1S2   No murmur RRR        	  GI/ ABDOMEN:  Soft  Non tender    EXTREMITIES:   No Edema  No Clubbing  No Cyanosis   	   SKIN: Normal  No Ecchymosis               LABS:  CBC Full  -  ( 21 Sep 2019 07:26 )  WBC Count : 9.97 K/uL  RBC Count : 4.37 M/uL  Hemoglobin : 13.2 g/dL  Hematocrit : 38.2 %  Platelet Count - Automated : 234 K/uL  Mean Cell Volume : 87.4 fl  Mean Cell Hemoglobin : 30.2 pg  Mean Cell Hemoglobin Concentration : 34.6 gm/dL  Auto Neutrophil # : 7.82 K/uL  Auto Lymphocyte # : 1.19 K/uL  Auto Monocyte # : 0.60 K/uL  Auto Eosinophil # : 0.27 K/uL  Auto Basophil # : 0.03 K/uL  Auto Neutrophil % : 78.5 %  Auto Lymphocyte % : 11.9 %  Auto Monocyte % : 6.0 %  Auto Eosinophil % : 2.7 %  Auto Basophil % : 0.3 %      09-21    142  |  104  |  5<L>  ----------------------------<  113<H>  3.6   |  29  |  0.86    Ca    8.4<L>      21 Sep 2019 07:26  Phos  3.0     09-21  Mg     2.2     09-21      Hemoglobin A1C:       Vitamin B12         RADIOLOGY      ANALYSIS AND PLAN:  An 88-year-old with episodes of change in mental status, history of intracerebral hemorrhage, dementia, and trigeminal neuralgia.  1.	For episode of change in mental status, possibly suspect multifactorial secondary to underlying infectious type process, UTI, and poor oral intake.  2.	Would recommend antibiotics as needed.  3.	For history of intracerebral hemorrhage, would recommend supportive therapy.  Repeat CAT scan does not show any new signs of intracerebral hemorrhage, limit the use of blood-thinning medications if possible.  4.	For history of dementia, continue the patient on Namenda 10 mg once a day.  5.	For history of trigeminal neuralgia, the patient is on Lyrica, I will add Tegretol to see if that will help.  6.	Aspiration precautions.  7.	Spoke with the son, Geovanni, at the bedside, telephone number [phone]. 9/21/19  8.	possible PEG placement     Greater than 40 minutes spent in direct patient care reviewing  the notes, lab data/ imaging , discussion with multidisciplinary team.

## 2019-09-21 NOTE — PROGRESS NOTE ADULT - PROBLEM SELECTOR PLAN 1
S&S recommended alternative to PO nutrition  -Repeat MBS(9/20) failed, a PO diet could not be recommended given the patient did not elicit a pharyngeal swallow and barium had to be removed from the oral cavity via Yankauer suctioning  -MBS test failed(9/19) as oral bolus remained in anterior oral cavity. The pharyngeal phase of the swallow could not be formally assessed under fluoroscopy given the severity of the oral phase deficits.  - As per RN and son, ENT Dr. Hill stated that pt had decreased patency in throat, but should be enough to allow passage of food and liquids.  -apprec neuro recs for trigeminal neuralgia tx  - discussed with family. Son understands that if pt fails MBS today, possibility of PEG tube will have to be reassessed. RN Beryl will reattempt meds with pureed consistency which patient was able to tolerate, s/s eval re-ordered for today  - Will NEED to edit MOLST form if pt gets PEG tube. As of now, it is checked off as NO tube feeding.  - as per family, pt has had PEG tube in past and was advanced to oral nutrition  - per chart review pt was being given comfort feeds   - dysphagia is likely 2/2 past CVAs and progressive deconditioning due to chronic disease state  - Continue NPO diet for now  - aspiration precautions  - gentle IV hydration while NPO  - Please communicate w/ son if any changes happen([phone])  -GI consulted

## 2019-09-21 NOTE — PROGRESS NOTE ADULT - ASSESSMENT
80 yo male pmx of hemorrhagic CVA, h/o amyloid angiopathy, dementia, HTN, candidal stomatitis, rosacea, blepharitis,chronic constipation, vitamin D deficiency, insomnia, neuralgia, depression, post herpetic neurologia, acute severe protein caloric malnutrition who presents to the ED with a cc of increasing confusion. Found with + UA and admitted with acute metabolic encephalopathy 2/2 urosepsis and now dysphagia sec to trigeminal neuralgia exacerbation and poss advanicng dementia

## 2019-09-22 LAB
ANION GAP SERPL CALC-SCNC: 7 MMOL/L — SIGNIFICANT CHANGE UP (ref 5–17)
BUN SERPL-MCNC: 4 MG/DL — LOW (ref 7–23)
CALCIUM SERPL-MCNC: 8.2 MG/DL — LOW (ref 8.5–10.1)
CHLORIDE SERPL-SCNC: 107 MMOL/L — SIGNIFICANT CHANGE UP (ref 96–108)
CO2 SERPL-SCNC: 30 MMOL/L — SIGNIFICANT CHANGE UP (ref 22–31)
CREAT SERPL-MCNC: 0.92 MG/DL — SIGNIFICANT CHANGE UP (ref 0.5–1.3)
GLUCOSE SERPL-MCNC: 107 MG/DL — HIGH (ref 70–99)
HCT VFR BLD CALC: 34.9 % — LOW (ref 39–50)
HGB BLD-MCNC: 12.1 G/DL — LOW (ref 13–17)
MCHC RBC-ENTMCNC: 30.9 PG — SIGNIFICANT CHANGE UP (ref 27–34)
MCHC RBC-ENTMCNC: 34.7 GM/DL — SIGNIFICANT CHANGE UP (ref 32–36)
MCV RBC AUTO: 89.3 FL — SIGNIFICANT CHANGE UP (ref 80–100)
NRBC # BLD: 0 /100 WBCS — SIGNIFICANT CHANGE UP (ref 0–0)
PLATELET # BLD AUTO: 219 K/UL — SIGNIFICANT CHANGE UP (ref 150–400)
POTASSIUM SERPL-MCNC: 3.5 MMOL/L — SIGNIFICANT CHANGE UP (ref 3.5–5.3)
POTASSIUM SERPL-SCNC: 3.5 MMOL/L — SIGNIFICANT CHANGE UP (ref 3.5–5.3)
RBC # BLD: 3.91 M/UL — LOW (ref 4.2–5.8)
RBC # FLD: 12.2 % — SIGNIFICANT CHANGE UP (ref 10.3–14.5)
SODIUM SERPL-SCNC: 144 MMOL/L — SIGNIFICANT CHANGE UP (ref 135–145)
WBC # BLD: 8.27 K/UL — SIGNIFICANT CHANGE UP (ref 3.8–10.5)
WBC # FLD AUTO: 8.27 K/UL — SIGNIFICANT CHANGE UP (ref 3.8–10.5)

## 2019-09-22 PROCEDURE — 99233 SBSQ HOSP IP/OBS HIGH 50: CPT

## 2019-09-22 RX ADMIN — Medication 50 MILLIGRAM(S): at 13:39

## 2019-09-22 RX ADMIN — Medication 50 MILLIGRAM(S): at 05:58

## 2019-09-22 RX ADMIN — Medication 100 MILLIGRAM(S): at 23:27

## 2019-09-22 RX ADMIN — Medication 50 MILLIGRAM(S): at 21:38

## 2019-09-22 RX ADMIN — SODIUM CHLORIDE 75 MILLILITER(S): 9 INJECTION, SOLUTION INTRAVENOUS at 03:24

## 2019-09-22 RX ADMIN — Medication 2000 UNIT(S): at 11:21

## 2019-09-22 RX ADMIN — Medication 100 MILLIGRAM(S): at 11:22

## 2019-09-22 RX ADMIN — CEFTRIAXONE 100 MILLIGRAM(S): 500 INJECTION, POWDER, FOR SOLUTION INTRAMUSCULAR; INTRAVENOUS at 21:38

## 2019-09-22 RX ADMIN — ENOXAPARIN SODIUM 40 MILLIGRAM(S): 100 INJECTION SUBCUTANEOUS at 11:21

## 2019-09-22 RX ADMIN — MEMANTINE HYDROCHLORIDE 10 MILLIGRAM(S): 10 TABLET ORAL at 11:22

## 2019-09-22 RX ADMIN — Medication 500 MILLIGRAM(S): at 11:21

## 2019-09-22 NOTE — PROGRESS NOTE ADULT - ASSESSMENT
80 yo male pmx of hemorrhagic CVA, h/o amyloid angiopathy, dementia, HTN, candidal stomatitis, rosacea, blepharitis,chronic constipation, vitamin D deficiency, insomnia, neuralgia, depression, post herpetic neurologia, acute severe protein caloric malnutrition who presents to the ED with a cc of increasing confusion. Found with + UA and admitted with acute metabolic encephalopathy 2/2 urosepsis and now dysphagia sec to trigeminal neuralgia

## 2019-09-22 NOTE — PROGRESS NOTE ADULT - SUBJECTIVE AND OBJECTIVE BOX
INTERVAL HPI/OVERNIGHT EVENTS:  pt seen and examined  resting in bed  per rn, tolerating po meds  wife noted to be feeding pt food from home yesterday  spoke w wife and son at bedside yesterday    MEDICATIONS  (STANDING):  ascorbic acid 500 milliGRAM(s) Oral daily  carBAMazepine 100 milliGRAM(s) Oral two times a day  cefTRIAXone   IVPB 1000 milliGRAM(s) IV Intermittent every 24 hours  cholecalciferol 2000 Unit(s) Oral daily  dextrose 5% + lactated ringers. 1000 milliLiter(s) (75 mL/Hr) IV Continuous <Continuous>  enoxaparin Injectable 40 milliGRAM(s) SubCutaneous daily  hydrochlorothiazide 12.5 milliGRAM(s) Oral daily  influenza   Vaccine 0.5 milliLiter(s) IntraMuscular once  memantine 10 milliGRAM(s) Oral daily  pregabalin 50 milliGRAM(s) Oral three times a day  traZODone 50 milliGRAM(s) Oral at bedtime    MEDICATIONS  (PRN):  acetaminophen   Tablet .. 650 milliGRAM(s) Oral every 6 hours PRN Temp greater or equal to 38C (100.4F)  magnesium hydroxide Suspension 30 milliLiter(s) Oral daily PRN Constipation      Allergies    No Known Allergies    Intolerances        Review of Systems:    unable to obtain      Vital Signs Last 24 Hrs  T(C): 37.4 (22 Sep 2019 04:53), Max: 37.4 (22 Sep 2019 04:53)  T(F): 99.4 (22 Sep 2019 04:53), Max: 99.4 (22 Sep 2019 04:53)  HR: 76 (22 Sep 2019 04:53) (75 - 83)  BP: 106/70 (22 Sep 2019 04:53) (104/70 - 106/70)  BP(mean): --  RR: 18 (22 Sep 2019 04:53) (18 - 18)  SpO2: 96% (22 Sep 2019 04:53) (96% - 97%)    PHYSICAL EXAM:    General:  nad  HEENT:  NC/AT  Chest:   dec bs  Cardiovascular:  Regular rhythm, S1, S2  Abdomen:  Soft, non-tender, non-distended, +scar from prior peg site  Extremities:  no  edema  Skin:  No rash  Neuro/Psych:  Awake alert      LABS:                        12.1   8.27  )-----------( 219      ( 22 Sep 2019 05:51 )             34.9     09-22    144  |  107  |  4<L>  ----------------------------<  107<H>  3.5   |  30  |  0.92    Ca    8.2<L>      22 Sep 2019 05:51  Phos  3.0     09-21  Mg     2.2     09-21            RADIOLOGY & ADDITIONAL TESTS:

## 2019-09-22 NOTE — PROGRESS NOTE ADULT - PROBLEM SELECTOR PLAN 4
-Pt severely malnourished, Temporal, clavicular, and shoulder muscle wasting present, BMI 19.7  -dysphagia is likely 2/2 past CVAs and progressive deconditioning due to chronic disease state  -discussion w/ family on PEG tube and are amendable it continued or refractory dysphagia will need to update molst  -GI consulted

## 2019-09-22 NOTE — PROGRESS NOTE ADULT - SUBJECTIVE AND OBJECTIVE BOX
JADE VILLALOBOS is a 79yMale , patient examined and chart reviewed.    INTERVAL HPI/ OVERNIGHT EVENTS:   No events. afebrile.     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    ROS:  Unable to obtain due to : Pt' condition    Current inpatient medications :    ANTIBIOTICS/RELEVANT:  cefTRIAXone   IVPB 1000 milliGRAM(s) IV Intermittent every 24 hours    acetaminophen   Tablet .. 650 milliGRAM(s) Oral every 6 hours PRN  ascorbic acid 500 milliGRAM(s) Oral daily  carBAMazepine 100 milliGRAM(s) Oral two times a day  cholecalciferol 2000 Unit(s) Oral daily  dextrose 5% + lactated ringers. 1000 milliLiter(s) IV Continuous <Continuous>  enoxaparin Injectable 40 milliGRAM(s) SubCutaneous daily  hydrochlorothiazide 12.5 milliGRAM(s) Oral daily  magnesium hydroxide Suspension 30 milliLiter(s) Oral daily PRN  memantine 10 milliGRAM(s) Oral daily  pregabalin 50 milliGRAM(s) Oral three times a day  traZODone 50 milliGRAM(s) Oral at bedtime      Objective:  Vital Signs Last 24 Hrs  T(C): 36.6 (22 Sep 2019 14:03), Max: 37.4 (22 Sep 2019 04:53)  T(F): 97.8 (22 Sep 2019 14:03), Max: 99.4 (22 Sep 2019 04:53)  HR: 85 (22 Sep 2019 14:03) (76 - 85)  BP: 117/77 (22 Sep 2019 14:03) (105/68 - 117/77)  RR: 18 (22 Sep 2019 14:03) (18 - 18)  SpO2: 97% (22 Sep 2019 14:03) (96% - 97%)    Physical Exam:  General: no acute distress  Eyes: sclera anicteric, pupils equal and reactive to light  ENMT: buccal mucosa moist, pharynx not injected  Neck: supple, trachea midline  Lungs: Decreased, no wheeze/rhonchi  Cardiovascular: regular rate and rhythm, S1 S2  Abdomen: soft, nontender, no organomegaly present, bowel sounds normal  Neurological: awake confused  Skin: no increased ecchymosis/petechiae/purpura  Lymph Nodes: no palpable cervical/supraclavicular lymph nodes enlargements  Extremities: no cyanosis/clubbing/edema      LABS:                                12.1   8.27  )-----------( 219      ( 22 Sep 2019 05:51 )             34.9   09-22    144  |  107  |  4<L>  ----------------------------<  107<H>  3.5   |  30  |  0.92    Ca    8.2<L>      22 Sep 2019 05:51  Phos  3.0     09-21  Mg     2.2     09-21    MICROBIOLOGY:    Culture - Blood (collected 16 Sep 2019 21:11)  Source: .Blood Blood-Venous  Preliminary Report (17 Sep 2019 22:01):    No growth to date.    Culture - Blood (collected 16 Sep 2019 21:08)  Source: .Blood Blood-Venous  Preliminary Report (17 Sep 2019 22:01):    No growth to date.    Culture - Urine (collected 16 Sep 2019 21:07)  Source: .Urine Clean Catch (Midstream)  Final Report (18 Sep 2019 18:48):    >100,000 CFU/ml Proteus mirabilis  Organism: Proteus mirabilis (18 Sep 2019 18:48)  Organism: Proteus mirabilis (18 Sep 2019 18:48)      -  Amikacin: S <=16      -  Ampicillin: S <=8 These ampicillin results predict results for amoxicillin      -  Ampicillin/Sulbactam: S <=8/4 Enterobacter, Citrobacter, and Serratia may develop resistance during prolonged therapy (3-4 days)      -  Aztreonam: S <=4      -  Cefazolin: S <=8 (MIC_CL_COM_ENTERIC_CEFAZU) For uncomplicated UTI with K. pneumoniae, E. coli, or P. mirablis: SELENA <=16 is sensitive and SELENA >=32 is resistant. This also predicts results for oral agents cefaclor, cefdinir, cefpodoxime, cefprozil, cefuroxime axetil, cephalexin and locarbef for uncomplicated UTI. Note that some isolates may be susceptible to these agents while testing resistant to cefazolin.      -  Cefepime: S <=4      -  Cefoxitin: S <=8      -  Ceftriaxone: S <=1 Enterobacter, Citrobacter, and Serratia may develop resistance during prolonged therapy      -  Ciprofloxacin: R >2      -  Gentamicin: S <=4      -  Levofloxacin: R >4      -  Meropenem: S <=1      -  Nitrofurantoin: R >64 Should not be used to treat pyelonephritis      -  Piperacillin/Tazobactam: S <=16      -  Tobramycin: S <=4      -  Trimethoprim/Sulfamethoxazole: S <=2/38      Method Type: SELENA    RADIOLOGY & ADDITIONAL STUDIES:    EXAM:  XR CHEST PORTABLE URGENT 1V                            PROCEDURE DATE:  09/16/2019          INTERPRETATION:  XR CHEST URGENT    Single AP view    HISTORY:  increased weakness    Comparison: Chest x-ray 5/20/2019      The cardiac silhouette is within normal limits. The lungs are clear. No   pleural abnormality.    IMPRESSION: Normal AP chest.      Assessment :   80 yo male pmx of hemorrhagic CVA on recent admission to [place] from 5/20-5/23 , h/o amyloid angiopathy, HTN, trigeminal neuralgia, depression, dementia, admitted with mental status change sec UTI with Proteus Also with progressive functional decline with global aphasia and dysphagia. High aspiration risk. Failed MBS. Family had refused PEG in the past. WBC resolved. Overall stable.    Plan :   Cont Rocephin day 7  Repeat swallow eval in am  Trend temps and wbc  Asp precautions    D/w Dr Conklin    Continue with present regiment.  Appropriate use of antibiotics and adverse effects reviewed.      I have discussed the above plan of care with patient/ family in detail. They expressed understanding of the  treatment plan . Risks, benefits and alternatives discussed in detail. I have asked if they have any questions or concerns and appropriately addressed them to the best of my ability .    > 35 minutes were spent in direct patient care reviewing notes, medications ,labs data/ imaging , discussion with multidisciplinary team.    Thank you for allowing me to participate in care of your patient .    Gerardo Mercedes MD  Infectious Disease  [phone]

## 2019-09-22 NOTE — PROGRESS NOTE ADULT - PROBLEM SELECTOR PLAN 1
multifactorial  npo/ivf  correct elytes prn  rec ppi daily  aspiration/gerd prec, elevate hob  pall care following  dw wife/son at bedside 9/21, awaiting slp re eval tomorrow, they do not want ngt in interim, per discussion, seem to be leaning towards peg if pt fails mbs  will follow

## 2019-09-22 NOTE — PROGRESS NOTE ADULT - PROBLEM SELECTOR PLAN 1
rpt Swallow eval pending  ID follow up noted  remains on ABX  remains NPO except MEDS  son at the bedside - yesterday - ambivalent about PEG  HOB elev  asp prec  oral hygiene  dementia and cva in this patient - assist with ADL  pt is DNR DNI  will follow  prognosis poor

## 2019-09-22 NOTE — CHART NOTE - NSCHARTNOTEFT_GEN_A_CORE
Assessment: Pt seen for malnutrition follow-up, NPO x 3 days. As per chart pt is a 79 year old male with a PMH of hemorrhagic CVA, h/o amyloid angiopathy, dementia, HTN, candidal stomatitis, rosacea, blepharitis,chronic constipation, vitamin D deficiency, insomnia, neuralgia, depression, post herpetic neurologia, acute severe protein caloric malnutrition who presents to the ED with a cc of increasing confusion. Found with + UA and admitted with acute metabolic encephalopathy 2/2 urosepsis and now dysphagia sec to trigeminal neuralgia exacerbation and poss advancing dementia. S/P MBS (9/19) and (9/20) with SLP recommending that oral nutrition/ hydration is contraindicated at this time. Pt is awaiting speech and swallow re-eval tomorrow. Per chart pt noted to currently be receiving comfortable feeds from pt's family. Pt's family is declining NGT in interim, still undecided about PEG, ambivalent but leaning more towards PEG if pt fails MBS again. No GI distress noted at this time, last BM 9/20.     Factors impacting intake:  [x ] swallowing issues    Diet Presciption: Diet, NPO:   Except Medications (09-18-19 @ 17:42)    Intake:     Current Weight: (9/21) 140.6lbs, (9/20) 144.1lbs  Previous Weight: (9/18) 149.2lbs   5.8% Weight Change- pt's weight noted to be trending down over the past few days     Pertinent Medications: MEDICATIONS  (STANDING):  ascorbic acid 500 milliGRAM(s) Oral daily  carBAMazepine 100 milliGRAM(s) Oral two times a day  cefTRIAXone   IVPB 1000 milliGRAM(s) IV Intermittent every 24 hours  cholecalciferol 2000 Unit(s) Oral daily  dextrose 5% + lactated ringers. 1000 milliLiter(s) (75 mL/Hr) IV Continuous <Continuous>  enoxaparin Injectable 40 milliGRAM(s) SubCutaneous daily  hydrochlorothiazide 12.5 milliGRAM(s) Oral daily  influenza   Vaccine 0.5 milliLiter(s) IntraMuscular once  memantine 10 milliGRAM(s) Oral daily  pregabalin 50 milliGRAM(s) Oral three times a day  traZODone 50 milliGRAM(s) Oral at bedtime    MEDICATIONS  (PRN):  acetaminophen   Tablet .. 650 milliGRAM(s) Oral every 6 hours PRN Temp greater or equal to 38C (100.4F)  magnesium hydroxide Suspension 30 milliLiter(s) Oral daily PRN Constipation    Pertinent Labs: 09-22 Na144 mmol/L Glu 107 mg/dL<H> K+ 3.5 mmol/L Cr  0.92 mg/dL BUN 4 mg/dL<L>, Hgb 12.1, Hct 34.9, Calcium 8.2,  09-21 Phos 3.0 mg/dL 09-17 Alb 2.9 g/dL<L>     CAPILLARY BLOOD GLUCOSE    Skin: No edema or pressure injuries noted at this time     Estimated Needs:   [x ] no change since previous assessment  [ ] recalculated:     Previous Nutrition Diagnosis:    [x ] Malnutrition (acute, severe)     Nutrition Diagnosis is [ x] ongoing-being addressed with possible PEG placement     New Nutrition Diagnosis: [x ] not applicable       Interventions:   Recommend  [ ] Change Diet To:  [ ] Nutrition Supplement  [x ] Nutrition Support: If pt and pt's family decide on PEG placement recommend TF of Jevity 1.5 at 75ml x 20hrs for a total volume of 1,500ml/day, provides 2,250 kcal/day, 96 gram protein day. Recommend to start TF at 30ml and increase by 10ml every 8 hours until goal rate is reached. Recommend additional 55ml/hr of free water.   [ x] Other:   1) If pt fails MBS and family decides against PEG consider PO pleasure feeds if it is within the pt's/pt's family wishes and GOC  2) Monitor pt's weight, skin, edema, GI distress   3) RD to remain available     Monitoring and Evaluation:   [ ] PO intake [ x ] Tolerance to diet prescription [ x ] weights [ x ] labs[ x ] follow up per protocol  [ ] other:

## 2019-09-22 NOTE — PROGRESS NOTE ADULT - PROBLEM SELECTOR PLAN 1
acute on chronic, recurrent  passed bedside swallow today after much encouragement by wife  cont trigeminal neuralgia meds  s/s eval, mbs tomorrow  asp precautions

## 2019-09-22 NOTE — PROGRESS NOTE ADULT - SUBJECTIVE AND OBJECTIVE BOX
Neurology follow up note    HANSEN [id number]      Interval History:    Patient being feed by spouse     MEDICATIONS    acetaminophen   Tablet .. 650 milliGRAM(s) Oral every 6 hours PRN  ascorbic acid 500 milliGRAM(s) Oral daily  carBAMazepine 100 milliGRAM(s) Oral two times a day  cefTRIAXone   IVPB 1000 milliGRAM(s) IV Intermittent every 24 hours  cholecalciferol 2000 Unit(s) Oral daily  dextrose 5% + lactated ringers. 1000 milliLiter(s) IV Continuous <Continuous>  enoxaparin Injectable 40 milliGRAM(s) SubCutaneous daily  hydrochlorothiazide 12.5 milliGRAM(s) Oral daily  influenza   Vaccine 0.5 milliLiter(s) IntraMuscular once  magnesium hydroxide Suspension 30 milliLiter(s) Oral daily PRN  memantine 10 milliGRAM(s) Oral daily  pregabalin 50 milliGRAM(s) Oral three times a day  traZODone 50 milliGRAM(s) Oral at bedtime      Allergies    No Known Allergies    Intolerances            Vital Signs Last 24 Hrs  T(C): 37.4 (22 Sep 2019 04:53), Max: 37.4 (22 Sep 2019 04:53)  T(F): 99.4 (22 Sep 2019 04:53), Max: 99.4 (22 Sep 2019 04:53)  HR: 76 (22 Sep 2019 04:53) (75 - 83)  BP: 106/70 (22 Sep 2019 04:53) (104/70 - 106/70)  BP(mean): --  RR: 18 (22 Sep 2019 04:53) (18 - 18)  SpO2: 96% (22 Sep 2019 04:53) (96% - 97%)    REVIEW OF SYSTEMS: Non Verbal    On Neurological Examination:    Mental Status - Patient is alert, awake    Extraocular movements appeared to be intact.      Pupils bilaterally 2 mm reactive to 1 mm.      Speech nonverbal.      Motor:  Bilateral upper; attempted to elevate arm, the patient would actively resist, pull both arms down, would say 4/5, bilateral lower extremities with plantar stimulation and flexation of hip and knee, would say 3+/5.  Sensory:  Unreliable.    Muscle tone - is normal all over.  No asymmetry is seen.      GENERAL Exam: Nontoxic , No Acute Distress   	  HEENT:  normocephalic, atraumatic  		  LUNGS:  Decreased bilaterally  	  HEART: Normal S1S2   No murmur RRR        	  GI/ ABDOMEN:  Soft  Non tender    EXTREMITIES:   No Edema  No Clubbing  No Cyanosis   	   SKIN: Normal  No Ecchymosis                    LABS:  CBC Full  -  ( 22 Sep 2019 05:51 )  WBC Count : 8.27 K/uL  RBC Count : 3.91 M/uL  Hemoglobin : 12.1 g/dL  Hematocrit : 34.9 %  Platelet Count - Automated : 219 K/uL  Mean Cell Volume : 89.3 fl  Mean Cell Hemoglobin : 30.9 pg  Mean Cell Hemoglobin Concentration : 34.7 gm/dL  Auto Neutrophil # : x  Auto Lymphocyte # : x  Auto Monocyte # : x  Auto Eosinophil # : x  Auto Basophil # : x  Auto Neutrophil % : x  Auto Lymphocyte % : x  Auto Monocyte % : x  Auto Eosinophil % : x  Auto Basophil % : x      09-22    144  |  107  |  4<L>  ----------------------------<  107<H>  3.5   |  30  |  0.92    Ca    8.2<L>      22 Sep 2019 05:51  Phos  3.0     09-21  Mg     2.2     09-21      Hemoglobin A1C:       Vitamin B12         RADIOLOGY      ANALYSIS AND PLAN:  An 88-year-old with episodes of change in mental status, history of intracerebral hemorrhage, dementia, and trigeminal neuralgia.  1.	For episode of change in mental status, possibly suspect multifactorial secondary to underlying infectious type process, UTI, and poor oral intake.  2.	Would recommend antibiotics as needed.  3.	For history of intracerebral hemorrhage, would recommend supportive therapy.  Repeat CAT scan does not show any new signs of intracerebral hemorrhage, limit the use of blood-thinning medications if possible.  4.	For history of dementia, continue the patient on Namenda 10 mg once a day.  5.	For history of trigeminal neuralgia, the patient is on Lyrica, I will add Tegretol to see if that will help.  6.	Aspiration precautions.  7.	Spoke with the son, Geovanni, at the bedside, telephone number [phone]. 9/22/19  8.	possible PEG placement if continue to have poor oral intake     Greater than 40 minutes spent in direct patient care reviewing  the notes, lab data/ imaging , discussion with multidisciplinary team.

## 2019-09-22 NOTE — PROGRESS NOTE ADULT - SUBJECTIVE AND OBJECTIVE BOX
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 1000 milliLiter(s) (75 mL/Hr) IV Continuous <Continuous>  enoxaparin Injectable 40 milliGRAM(s) SubCutaneous daily  hydrochlorothiazide 12.5 milliGRAM(s) Oral daily  influenza   Vaccine 0.5 milliLiter(s) IntraMuscular once  memantine 10 milliGRAM(s) Oral daily  pregabalin 50 milliGRAM(s) Oral three times a day  traZODone 50 milliGRAM(s) Oral at bedtime    MEDICATIONS  (PRN):  acetaminophen   Tablet .. 650 milliGRAM(s) Oral every 6 hours PRN Temp greater or equal to 38C (100.4F)  magnesium hydroxide Suspension 30 milliLiter(s) Oral daily PRN Constipation         Vitals log        ICU Vital Signs Last 24 Hrs  T(C): 37.4 (22 Sep 2019 04:53), Max: 37.4 (22 Sep 2019 04:53)  T(F): 99.4 (22 Sep 2019 04:53), Max: 99.4 (22 Sep 2019 04:53)  HR: 76 (22 Sep 2019 04:53) (75 - 83)  BP: 106/70 (22 Sep 2019 04:53) (104/70 - 106/70)  BP(mean): --  ABP: --  ABP(mean): --  RR: 18 (22 Sep 2019 04:53) (18 - 18)  SpO2: 96% (22 Sep 2019 04:53) (96% - 97%)           Input and Output:  I&O's Detail    20 Sep 2019 07:01  -  21 Sep 2019 07:00  --------------------------------------------------------  IN:    dextrose 5% + lactated ringers.: 1350 mL    lactated ringers.: 375 mL  Total IN: 1725 mL    OUT:    Incontinent per Condom Catheter: 1 mL  Total OUT: 1 mL    Total NET: 1724 mL      21 Sep 2019 07:01  -  22 Sep 2019 06:02  --------------------------------------------------------  IN:  Total IN: 0 mL    OUT:    Incontinent per Condom Catheter: 1 mL  Total OUT: 1 mL    Total NET: -1 mL          Lab Data                        12.1   8.27  )-----------( 219      ( 22 Sep 2019 05:51 )             34.9     09-21    142  |  104  |  5<L>  ----------------------------<  113<H>  3.6   |  29  |  0.86    Ca    8.4<L>      21 Sep 2019 07:26  Phos  3.0     09-21  Mg     2.2     09-21              Review of Systems	      Objective     Physical Examination    heart s1s2  lung dec BS      Pertinent Lab findings & Imaging      Angel:  NO   Adequate UO     I&O's Detail    20 Sep 2019 07:01  -  21 Sep 2019 07:00  --------------------------------------------------------  IN:    dextrose 5% + lactated ringers.: 1350 mL    lactated ringers.: 375 mL  Total IN: 1725 mL    OUT:    Incontinent per Condom Catheter: 1 mL  Total OUT: 1 mL    Total NET: 1724 mL      21 Sep 2019 07:01  -  22 Sep 2019 06:02  --------------------------------------------------------  IN:  Total IN: 0 mL    OUT:    Incontinent per Condom Catheter: 1 mL  Total OUT: 1 mL    Total NET: -1 mL               Discussed with:     Cultures:	        Radiology

## 2019-09-22 NOTE — PROGRESS NOTE ADULT - SUBJECTIVE AND OBJECTIVE BOX
Patient is a 79y old  Male who presents with a chief complaint of AMS/uti (22 Sep 2019 12:19)      INTERVAL HPI:  OVERNIGHT EVENTS:  T(F): 97.8 (09-22-19 @ 14:03), Max: 99.4 (09-22-19 @ 04:53)  HR: 85 (09-22-19 @ 14:03) (76 - 85)  BP: 117/77 (09-22-19 @ 14:03) (105/68 - 117/77)  RR: 18 (09-22-19 @ 14:03) (18 - 18)  SpO2: 97% (09-22-19 @ 14:03) (96% - 97%)  I&O's Summary    21 Sep 2019 07:01  -  22 Sep 2019 07:00  --------------------------------------------------------  IN: 0 mL / OUT: 1 mL / NET: -1 mL        REVIEW OF SYSTEMS:  CONSTITUTIONAL: No fever, weight loss, or fatigue  EYES: No eye pain, visual disturbances, or discharge  ENMT:  No difficulty hearing, tinnitus, vertigo; No sinus or throat pain  NECK: No pain or stiffness  BREASTS: No pain, masses, or nipple discharge  RESPIRATORY: No cough, wheezing, chills or hemoptysis; No shortness of breath  CARDIOVASCULAR: No chest pain, palpitations, dizziness, or leg swelling  GASTROINTESTINAL: No abdominal or epigastric pain. No nausea, vomiting, or hematemesis; No diarrhea or constipation. No melena or hematochezia.  GENITOURINARY: No dysuria, frequency, hematuria, or incontinence  NEUROLOGICAL: No headaches, memory loss, loss of strength, numbness, or tremors  SKIN: No itching, burning, rashes, or lesions   LYMPH NODES: No enlarged glands  ENDOCRINE: No heat or cold intolerance; No hair loss  MUSCULOSKELETAL: No joint pain or swelling; No muscle, back, or extremity pain  PSYCHIATRIC: No depression, anxiety, mood swings, or difficulty sleeping  HEME/LYMPH: No easy bruising, or bleeding gums  ALLERY AND IMMUNOLOGIC: No hives or eczema    PHYSICAL EXAM:  GENERAL: NAD, well-groomed, well-developed  HEAD:  Atraumatic, Normocephalic  EYES: EOMI, PERRLA, conjunctiva and sclera clear  ENMT: No tonsillar erythema, exudates, or enlargement; Moist mucous membranes, Good dentition, No lesions  NECK: Supple, No JVD, Normal thyroid  NERVOUS SYSTEM:  Alert & Oriented X3, Good concentration; Motor Strength 5/5 B/L upper and lower extremities; DTRs 2+ intact and symmetric  CHEST/LUNG: Clear to percussion bilaterally; No rales, rhonchi, wheezing, or rubs  HEART: Regular rate and rhythm; No murmurs, rubs, or gallops  ABDOMEN: Soft, Nontender, Nondistended; Bowel sounds present  EXTREMITIES:  2+ Peripheral Pulses, No clubbing, cyanosis, or edema  LYMPH: No lymphadenopathy noted  SKIN: No rashes or lesions    LABS:                        12.1   8.27  )-----------( 219      ( 22 Sep 2019 05:51 )             34.9     09-22    144  |  107  |  4<L>  ----------------------------<  107<H>  3.5   |  30  |  0.92    Ca    8.2<L>      22 Sep 2019 05:51  Phos  3.0     09-21  Mg     2.2     09-21          CAPILLARY BLOOD GLUCOSE                  MEDICATIONS  (STANDING):  ascorbic acid 500 milliGRAM(s) Oral daily  carBAMazepine 100 milliGRAM(s) Oral two times a day  cefTRIAXone   IVPB 1000 milliGRAM(s) IV Intermittent every 24 hours  cholecalciferol 2000 Unit(s) Oral daily  dextrose 5% + lactated ringers. 1000 milliLiter(s) (75 mL/Hr) IV Continuous <Continuous>  enoxaparin Injectable 40 milliGRAM(s) SubCutaneous daily  hydrochlorothiazide 12.5 milliGRAM(s) Oral daily  influenza   Vaccine 0.5 milliLiter(s) IntraMuscular once  memantine 10 milliGRAM(s) Oral daily  pregabalin 50 milliGRAM(s) Oral three times a day  traZODone 50 milliGRAM(s) Oral at bedtime    MEDICATIONS  (PRN):  acetaminophen   Tablet .. 650 milliGRAM(s) Oral every 6 hours PRN Temp greater or equal to 38C (100.4F)  magnesium hydroxide Suspension 30 milliLiter(s) Oral daily PRN Constipation Patient is a 79y old  Male who presents with a chief complaint of AMS/uti (22 Sep 2019 12:19)      INTERVAL HPI: Pt seen and examined. Wife at bedside, states she will still like to attempt to feed him today, collaborated with JOANIE deras and pt was able to swallow meds this AM. Beryl RN was able to perform bedside swallow, pt noted to keep food in mouth but after much encouragement by wife pt was able to swallow. Pt seems to have neuralgia discomfort with chewing. Pt nonverbal.     OVERNIGHT EVENTS: none noted  T(F): 97.8 (09-22-19 @ 14:03), Max: 99.4 (09-22-19 @ 04:53)  HR: 85 (09-22-19 @ 14:03) (76 - 85)  BP: 117/77 (09-22-19 @ 14:03) (105/68 - 117/77)  RR: 18 (09-22-19 @ 14:03) (18 - 18)  SpO2: 97% (09-22-19 @ 14:03) (96% - 97%)  I&O's Summary    21 Sep 2019 07:01  -  22 Sep 2019 07:00  --------------------------------------------------------  IN: 0 mL / OUT: 1 mL / NET: -1 mL        REVIEW OF SYSTEMS: pt is nonverbal    PHYSICAL EXAM:  GENERAL: NAD, well-groomed, elder  NERVOUS SYSTEM:  Alert & Oriented X1, Motor Strength 2/5 B/L upper and lower extremities  CHEST/LUNG: Clear to percussion bilaterally; No rales, rhonchi, wheezing, or rubs  HEART: Regular rate and rhythm; No murmurs, rubs, or gallops  ABDOMEN: Soft, Nontender, Nondistended; Bowel sounds present  EXTREMITIES:  2+ Peripheral Pulses, No clubbing, cyanosis, or edema  SKIN: No rashes or lesions    LABS:                        12.1   8.27  )-----------( 219      ( 22 Sep 2019 05:51 )             34.9     09-22    144  |  107  |  4<L>  ----------------------------<  107<H>  3.5   |  30  |  0.92    Ca    8.2<L>      22 Sep 2019 05:51  Phos  3.0     09-21  Mg     2.2     09-21          CAPILLARY BLOOD GLUCOSE                  MEDICATIONS  (STANDING):  ascorbic acid 500 milliGRAM(s) Oral daily  carBAMazepine 100 milliGRAM(s) Oral two times a day  cefTRIAXone   IVPB 1000 milliGRAM(s) IV Intermittent every 24 hours  cholecalciferol 2000 Unit(s) Oral daily  dextrose 5% + lactated ringers. 1000 milliLiter(s) (75 mL/Hr) IV Continuous <Continuous>  enoxaparin Injectable 40 milliGRAM(s) SubCutaneous daily  hydrochlorothiazide 12.5 milliGRAM(s) Oral daily  influenza   Vaccine 0.5 milliLiter(s) IntraMuscular once  memantine 10 milliGRAM(s) Oral daily  pregabalin 50 milliGRAM(s) Oral three times a day  traZODone 50 milliGRAM(s) Oral at bedtime    MEDICATIONS  (PRN):  acetaminophen   Tablet .. 650 milliGRAM(s) Oral every 6 hours PRN Temp greater or equal to 38C (100.4F)  magnesium hydroxide Suspension 30 milliLiter(s) Oral daily PRN Constipation

## 2019-09-22 NOTE — PROGRESS NOTE ADULT - ASSESSMENT
78 yo male pmx of hemorrhagic CVA, h/o amyloid angiopathy, dementia, HTN, candidal stomatitis, rosacea, blepharitis,chronic constipation, vitamin D deficiency, insomnia, neuralgia, depression, post herpetic neurologia, acute severe protein caloric malnutrition who presents to the ED with a cc of increasing confusion. Found with + UA and admitted with acute metabolic encephalopathy 2/2 urosepsis and now dysphagia sec to trigeminal neuralgia exacerbation and poss advanicng dementia

## 2019-09-23 LAB
ANION GAP SERPL CALC-SCNC: 5 MMOL/L — SIGNIFICANT CHANGE UP (ref 5–17)
BUN SERPL-MCNC: 6 MG/DL — LOW (ref 7–23)
CALCIUM SERPL-MCNC: 8.2 MG/DL — LOW (ref 8.5–10.1)
CHLORIDE SERPL-SCNC: 108 MMOL/L — SIGNIFICANT CHANGE UP (ref 96–108)
CO2 SERPL-SCNC: 31 MMOL/L — SIGNIFICANT CHANGE UP (ref 22–31)
CREAT SERPL-MCNC: 0.89 MG/DL — SIGNIFICANT CHANGE UP (ref 0.5–1.3)
GLUCOSE SERPL-MCNC: 90 MG/DL — SIGNIFICANT CHANGE UP (ref 70–99)
HCT VFR BLD CALC: 37.3 % — LOW (ref 39–50)
HGB BLD-MCNC: 12.4 G/DL — LOW (ref 13–17)
MCHC RBC-ENTMCNC: 29.9 PG — SIGNIFICANT CHANGE UP (ref 27–34)
MCHC RBC-ENTMCNC: 33.2 GM/DL — SIGNIFICANT CHANGE UP (ref 32–36)
MCV RBC AUTO: 89.9 FL — SIGNIFICANT CHANGE UP (ref 80–100)
NRBC # BLD: 0 /100 WBCS — SIGNIFICANT CHANGE UP (ref 0–0)
PLATELET # BLD AUTO: 236 K/UL — SIGNIFICANT CHANGE UP (ref 150–400)
POTASSIUM SERPL-MCNC: 3.7 MMOL/L — SIGNIFICANT CHANGE UP (ref 3.5–5.3)
POTASSIUM SERPL-SCNC: 3.7 MMOL/L — SIGNIFICANT CHANGE UP (ref 3.5–5.3)
RBC # BLD: 4.15 M/UL — LOW (ref 4.2–5.8)
RBC # FLD: 12.3 % — SIGNIFICANT CHANGE UP (ref 10.3–14.5)
SODIUM SERPL-SCNC: 144 MMOL/L — SIGNIFICANT CHANGE UP (ref 135–145)
WBC # BLD: 7.41 K/UL — SIGNIFICANT CHANGE UP (ref 3.8–10.5)
WBC # FLD AUTO: 7.41 K/UL — SIGNIFICANT CHANGE UP (ref 3.8–10.5)

## 2019-09-23 PROCEDURE — 99233 SBSQ HOSP IP/OBS HIGH 50: CPT | Mod: GC

## 2019-09-23 RX ORDER — SODIUM CHLORIDE 9 MG/ML
1000 INJECTION, SOLUTION INTRAVENOUS
Refills: 0 | Status: DISCONTINUED | OUTPATIENT
Start: 2019-09-23 | End: 2019-09-27

## 2019-09-23 RX ADMIN — Medication 50 MILLIGRAM(S): at 22:00

## 2019-09-23 RX ADMIN — MEMANTINE HYDROCHLORIDE 10 MILLIGRAM(S): 10 TABLET ORAL at 12:29

## 2019-09-23 RX ADMIN — Medication 100 MILLIGRAM(S): at 22:00

## 2019-09-23 RX ADMIN — Medication 50 MILLIGRAM(S): at 06:27

## 2019-09-23 RX ADMIN — Medication 12.5 MILLIGRAM(S): at 06:27

## 2019-09-23 RX ADMIN — Medication 500 MILLIGRAM(S): at 12:28

## 2019-09-23 RX ADMIN — Medication 2000 UNIT(S): at 12:29

## 2019-09-23 RX ADMIN — Medication 100 MILLIGRAM(S): at 12:28

## 2019-09-23 RX ADMIN — ENOXAPARIN SODIUM 40 MILLIGRAM(S): 100 INJECTION SUBCUTANEOUS at 12:29

## 2019-09-23 RX ADMIN — Medication 50 MILLIGRAM(S): at 13:51

## 2019-09-23 RX ADMIN — SODIUM CHLORIDE 50 MILLILITER(S): 9 INJECTION, SOLUTION INTRAVENOUS at 12:35

## 2019-09-23 NOTE — SWALLOW BEDSIDE ASSESSMENT ADULT - SWALLOW EVAL: RECOMMENDED DIET
1. Oral nutrition/hydration is contraindicated at this time. 2. Consider short-term vs long-term non-oral means of nutrition/hydration as the patient is at an increased nutritional and aspiration risk at this time. 3. Discuss the goals of care regarding the nutritional plan of care with the patient's family members.

## 2019-09-23 NOTE — SWALLOW BEDSIDE ASSESSMENT ADULT - COMMENTS
Consult received and chart reviewed. Attempted to see patient this afternoon for an initial assessment of swallow function, however, patient currently requiring nursing care. RN made aware. This department to re-attempt as schedule permits.
Consult received and chart reviewed. The patient was seen at bedside this afternoon for an initial assessment of swallow function, at which time he was sleeping though arouseable. The patient has been followed by this department during this admission, at which time a PO diet could not be recommended. Patient's wife present at bedside throughout this evaluation.     Per charting, "88-year-old with episodes of change in mental status, history of intracerebral hemorrhage, dementia, and trigeminal neuralgia." MD wanted a re-evaluation of swallow function given patient now started on Tegretol." Recent CXR revealed, "The cardiac silhouette is within normal limits. The lungs are clear. No pleural abnormality." Upon further chart review, the patient was seen for a Modified Barium Swallow Study in May 2019 at [facility], at which time an oral diet could not be recommended. Discussed results and recommendations from this evaluation with the patient, patient's wife, RN, and call out to MD.
Patient seen at bedside at which time he was awake, family present. Upon questioning, patient's son reported that the patient has a history of dysphagia and has recently demonstrated reduced PO intake due to pain when eating/drinking.
Consult received and chart reviewed. The patient was seen this afternoon for a re-assessment of swallow function, at which time he was awake, though requiring maximum verbal/visual/tactile prompting to participate in today's evaluation. The patient exhibited increased frustration and agitation when being presented with PO trials by this clinician and patient's wife who was present at bedside. The patient was last seen by this department on 9/19/19 for a Modified Barium Swallow Study (please see full report for details), at which time a PO diet could not be recommended given the patient did not elicit a pharyngeal swallow and barium had to be removed from the oral cavity via Yankauer suctioning.    Per charting, the patient is an "88-year-old with episodes of change in mental status, history of intracerebral hemorrhage, dementia, and trigeminal neuralgia." MD wanted a re-evaluation of swallow function given patient now started on Tegretol. Discussed results and recommendations from this evaluation with the patient's wife, RN, and call out to MD.

## 2019-09-23 NOTE — PROGRESS NOTE ADULT - PROBLEM SELECTOR PLAN 1
cva  dementia  frailty  weakness  off ABX  on PO Diet at present  HOB elev  asp prec  assist with ADL  pt is DNR  prognosis remains guarded  will follow  supportive medical regimen and care  discussed GOC with SON -   dc planning SINTIA discussion -

## 2019-09-23 NOTE — SWALLOW BEDSIDE ASSESSMENT ADULT - SPECIFY REASON(S)
To assess swallow function
To re-assess swallow function
assess swallow function
To re-assess swallow function

## 2019-09-23 NOTE — PROGRESS NOTE ADULT - PROBLEM SELECTOR PLAN 1
multifactorial  now on dysphagia diet as per primary  await re evaluation by speech  correct elytes prn  rec ppi daily  aspiration/gerd prec, elevate hob  dw wife/son at bedside 9/21, they do not want ngt in interim, per discussion, seem to be leaning towards peg if pt fails repeat slp eval today; pall care following  will follow; linda slp

## 2019-09-23 NOTE — PROGRESS NOTE ADULT - PROBLEM SELECTOR PLAN 7
acute on Chronic, trigeminal   lyrica increased to 50mg tid; possible de-escalation to BID tomorrow depending on  whether patient seems lethargic   Cont Tegretol

## 2019-09-23 NOTE — SWALLOW BEDSIDE ASSESSMENT ADULT - SWALLOW EVAL: DIAGNOSIS
1. The patient demonstrated a severe oral dysphagia for puree marked by reduced orientation to the tsp, reduced stripping of the bolus from the tsp, and absent bolus manipulation and posterior transport in the oral cavity despite maximum prompting by this clinician and patient's wife. The puree bolus was observed to remain in the R lateral sulci. 2. The pharyngeal phase of the swallow could not be assessed given the severity of the oral phase deficits. 3. Subsequently, the puree bolus was manually removed from the patient's oral cavity by this clinician via Yankauer suctioning.
Patient provided with 5 cc of honey thick liquids at which time increased oral holding with delayed a-p transport time noted. In addition, patient with delayed swallow trigger with significantly reduced laryngeal elevation. Consequently, oral suction performed at which time bolus expectorated in addition to copious secretions. Patient refused additional PO presentations due despite verbal encouragement from family /clinician.
1. The patient demonstrated a moderate oral dysphagia for puree and honey thick liquids marked by reduced stripping of the bolus from the utensil, prolonged oral holding resulting in delayed bolus collection and transfer with suspected posterior loss over the base of tongue. 2. The patient demonstrated a moderate pharyngeal dysphagia for puree and honey thick liquids marked by a significantly delayed pharyngeal swallow trigger with reduced hyolaryngeal elevation and reduced swallow intensity upon digital palpation resulting in multiple swallows suggestive of pharyngeal stasis and/or airway penetration and throat clearing x1 suggestive of airway penetration.

## 2019-09-23 NOTE — SWALLOW BEDSIDE ASSESSMENT ADULT - SWALLOW EVAL: CURRENT DIET
Dysphagia 1 with honey thick liquids per MD order
NPO except medication per MD order
puree with honey thick liquids per MD order
Dysphagia 1 with honey thick liquids per MD order

## 2019-09-23 NOTE — PROGRESS NOTE ADULT - SUBJECTIVE AND OBJECTIVE BOX
Neurology follow up note    HANSEN [id number]      Interval History:    Patient feels ok no new complaints.    MEDICATIONS    acetaminophen   Tablet .. 650 milliGRAM(s) Oral every 6 hours PRN  ascorbic acid 500 milliGRAM(s) Oral daily  carBAMazepine 100 milliGRAM(s) Oral two times a day  cholecalciferol 2000 Unit(s) Oral daily  dextrose 5% + lactated ringers. 1000 milliLiter(s) IV Continuous <Continuous>  enoxaparin Injectable 40 milliGRAM(s) SubCutaneous daily  hydrochlorothiazide 12.5 milliGRAM(s) Oral daily  influenza   Vaccine 0.5 milliLiter(s) IntraMuscular once  magnesium hydroxide Suspension 30 milliLiter(s) Oral daily PRN  memantine 10 milliGRAM(s) Oral daily  pregabalin 50 milliGRAM(s) Oral three times a day  traZODone 50 milliGRAM(s) Oral at bedtime      Allergies    No Known Allergies    Intolerances            Vital Signs Last 24 Hrs  T(C): 36.8 (23 Sep 2019 05:05), Max: 37.4 (22 Sep 2019 21:14)  T(F): 98.3 (23 Sep 2019 05:05), Max: 99.4 (22 Sep 2019 21:14)  HR: 89 (23 Sep 2019 05:05) (85 - 89)  BP: 120/78 (23 Sep 2019 05:05) (116/74 - 120/78)  BP(mean): --  RR: 17 (23 Sep 2019 05:05) (17 - 18)  SpO2: 96% (23 Sep 2019 05:05) (94% - 97%)      REVIEW OF SYSTEMS: Non Verbal    On Neurological Examination:    Mental Status - Patient is alert, awake    Extraocular movements appeared to be intact.      Pupils bilaterally 2 mm reactive to 1 mm.      Speech nonverbal.      Motor:  Bilateral upper; attempted to elevate arm, the patient would actively resist, pull both arms down, would say 4/5, bilateral lower extremities with plantar stimulation and flexation of hip and knee, would say 3+/5.    Sensory:  Unreliable.    Muscle tone - is normal all over.  No asymmetry is seen.      GENERAL Exam: Nontoxic , No Acute Distress   	  HEENT:  normocephalic, atraumatic  		  LUNGS:  Decreased bilaterally  	  HEART: Normal S1S2   No murmur RRR        	  GI/ ABDOMEN:  Soft  Non tender    EXTREMITIES:   No Edema  No Clubbing  No Cyanosis   	   SKIN: Normal  No Ecchymosis             LABS:  CBC Full  -  ( 23 Sep 2019 08:39 )  WBC Count : 7.41 K/uL  RBC Count : 4.15 M/uL  Hemoglobin : 12.4 g/dL  Hematocrit : 37.3 %  Platelet Count - Automated : 236 K/uL  Mean Cell Volume : 89.9 fl  Mean Cell Hemoglobin : 29.9 pg  Mean Cell Hemoglobin Concentration : 33.2 gm/dL  Auto Neutrophil # : x  Auto Lymphocyte # : x  Auto Monocyte # : x  Auto Eosinophil # : x  Auto Basophil # : x  Auto Neutrophil % : x  Auto Lymphocyte % : x  Auto Monocyte % : x  Auto Eosinophil % : x  Auto Basophil % : x      09-23    144  |  108  |  6<L>  ----------------------------<  90  3.7   |  31  |  0.89    Ca    8.2<L>      23 Sep 2019 08:39      Hemoglobin A1C:       Vitamin B12         RADIOLOGY      ANALYSIS AND PLAN:  An 88-year-old with episodes of change in mental status, history of intracerebral hemorrhage, dementia, and trigeminal neuralgia.  1.	For episode of change in mental status, possibly suspect multifactorial secondary to underlying infectious type process, UTI, and poor oral intake.  2.	Would recommend antibiotics as needed.  3.	For history of intracerebral hemorrhage, would recommend supportive therapy.  Repeat CAT scan does not show any new signs of intracerebral hemorrhage, limit the use of blood-thinning medications if possible.  4.	For history of dementia, continue the patient on Namenda 10 mg once a day.  5.	For history of trigeminal neuralgia, the patient is on Lyrica, I will add Tegretol to see if that will help.  6.	Aspiration precautions.  7.	Spoke with the son, Geovanni, at the bedside, telephone number [phone]. 9/23/19  8.	possible PEG placement if continue to have poor oral intake     Greater than 40 minutes spent in direct patient care reviewing  the notes, lab data/ imaging , discussion with multidisciplinary team.

## 2019-09-23 NOTE — SWALLOW BEDSIDE ASSESSMENT ADULT - SWALLOW EVAL: VELAR ELEVATION
Unable to formally assess oral-motor function given patient's inability to follow low-level directives.
Unable to formally assess oral-motor function given patient's difficulty following low-level verbal and visual directives

## 2019-09-23 NOTE — SWALLOW BEDSIDE ASSESSMENT ADULT - ASR SWALLOW REFERRAL
To facilitate adequate daily caloric intake./Registered Dietitian
Registered Dietitian/facilitate adequate caloric intake
Registered Dietitian/To facilitate adequate daily caloric intake.

## 2019-09-23 NOTE — PROGRESS NOTE ADULT - PROBLEM SELECTOR PLAN 2
Sepsis 2/2 UTI  Ceftriaxone course completed 9/22   follow fever curve closely, if new fever, get repeat cultures and CXR as patient is high risk for aspiration

## 2019-09-23 NOTE — PROGRESS NOTE ADULT - SUBJECTIVE AND OBJECTIVE BOX
Patient is a 79y old  Male who presents with a chief complaint of AMS/uti (23 Sep 2019 10:45)      FROM ADMISSION H+P:   HPI:  78 yo male pmx of hemorrhagic CVA, h/o amyloid angiopathy, dementia, HTN, candidal stomatitis, rosacea, blepharitis, chronic constipation, vitamin D deficiency, insomnia, neuralgia, depression, post herpetic neurologia who presents to the ED with a cc of increasing confusion.   Recently admitted to [facility] from 5/20-5/23 after pt was found to have new small amount of hemorrhage in the right posterior frontal parasagittal region.  No acute intervention was preformed.  He was noted to have a progressive functional decline with global aphasia and dysphagia.  PEG was recommend family refused.  Pt was discharged to NH.  Per wife history pt has been improving during his stay at the NH.  He had progressed to being able to ambulate with a walker and assistance and was able to tolerate soft mechanical soft diet and soon to be advanced to regular per son.  Approx 1 week ago pt had a flare of his trigeminal neuralgia and since then has had a progressive decline.  They reports that he has not been able to ambulate and has not been able to swallow for the last 3-4 days.  These symptoms were similar to his last re-bleed and so they became concerned and came to the ED for further work up. (16 Sep 2019 21:26)      ----  INTERVAL HPI/OVERNIGHT EVENTS: Pt seen and evaluated at the bedside. No acute overnight events occurred. Diet advanced to dysphagia 1 yesterday; however, patient failed formal speech and swallow eval today 9/23. Patient's diet changed to NPO. MBS tomorrow (9/24); if patient fails, possible PEG tube placement to be done if family is still agreeable. Antibiotic regimen completed yesterday; patient has been afebrile and no WBC count as of now.     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 1st 5 yrs ago and 2 subsequent most recently on labor day  Amyloid disease  No significant past surgical history      FAMILY HISTORY:  No pertinent family history in first degree relatives      Allergies    No Known Allergies    Intolerances        ----  REVIEW OF SYSTEMS:  unable to obtain due to AMS     ----  PHYSICAL EXAM:  GENERAL: elderly frail patient, NAD, appropriately interactive  LUNGS: good air entry bilaterally, clear to auscultation, symmetric breath sounds, no wheezing or rhonchi appreciated  HEART: soft S1/S2, regular rate and rhythm, no murmurs noted, no noted edema to b/l LE  GASTROINTESTINAL: abdomen is soft, nontender, nondistended, normoactive bowel sounds, no palpable masses  NEUROLOGIC: awake, alert, oriented x 1, good muscle tone in 4 extremities, motor strength 2/5 in b/l upper and lower extremities       T(C): 36.6 (09-23-19 @ 13:57), Max: 37.4 (09-22-19 @ 21:14)  HR: 87 (09-23-19 @ 13:57) (86 - 89)  BP: 105/70 (09-23-19 @ 13:57) (105/70 - 120/78)  RR: 18 (09-23-19 @ 13:57) (17 - 18)  SpO2: 96% (09-23-19 @ 13:57) (94% - 96%)    ----  I&O's Summary      LABS:                        12.4   7.41  )-----------( 236      ( 23 Sep 2019 08:39 )             37.3     09-23    144  |  108  |  6<L>  ----------------------------<  90  3.7   |  31  |  0.89    Ca    8.2<L>      23 Sep 2019 08:39          CAPILLARY BLOOD GLUCOSE                    ----  Personally reviewed:  Vital sign trends: [ x ] yes    [  ] no     [  ] n/a  Laboratory results: [ x ] yes    [  ] no     [  ] n/a  Radiology results: [ x ] yes    [  ] no     [  ] n/a  Culture results: [ x ] yes    [  ] no     [  ] n/a  Consultant recommendations: [x  ] yes    [  ] no     [  ] n/a

## 2019-09-23 NOTE — PROGRESS NOTE ADULT - SUBJECTIVE AND OBJECTIVE BOX
Date/Time Patient Seen:  		  Referring MD:   Data Reviewed	       Patient is a 79y old  Male who presents with a chief complaint of AMS/uti (22 Sep 2019 17:54)      Subjective/HPI     PAST MEDICAL & SURGICAL HISTORY:  Dementia  CVA (cerebral infarction): 3 previous hemorrhagic strokes secondary to amyloid disease 1st 5 yrs ago and 2 subsequent most recently on labor day  Amyloid disease  No significant past surgical history        Medication list         MEDICATIONS  (STANDING):  ascorbic acid 500 milliGRAM(s) Oral daily  carBAMazepine 100 milliGRAM(s) Oral two times a day  cholecalciferol 2000 Unit(s) Oral daily  dextrose 5% + lactated ringers. 1000 milliLiter(s) (75 mL/Hr) IV Continuous <Continuous>  enoxaparin Injectable 40 milliGRAM(s) SubCutaneous daily  hydrochlorothiazide 12.5 milliGRAM(s) Oral daily  influenza   Vaccine 0.5 milliLiter(s) IntraMuscular once  memantine 10 milliGRAM(s) Oral daily  pregabalin 50 milliGRAM(s) Oral three times a day  traZODone 50 milliGRAM(s) Oral at bedtime    MEDICATIONS  (PRN):  acetaminophen   Tablet .. 650 milliGRAM(s) Oral every 6 hours PRN Temp greater or equal to 38C (100.4F)  magnesium hydroxide Suspension 30 milliLiter(s) Oral daily PRN Constipation         Vitals log        ICU Vital Signs Last 24 Hrs  T(C): 36.8 (23 Sep 2019 05:05), Max: 37.4 (22 Sep 2019 21:14)  T(F): 98.3 (23 Sep 2019 05:05), Max: 99.4 (22 Sep 2019 21:14)  HR: 89 (23 Sep 2019 05:05) (85 - 89)  BP: 120/78 (23 Sep 2019 05:05) (116/74 - 120/78)  BP(mean): --  ABP: --  ABP(mean): --  RR: 17 (23 Sep 2019 05:05) (17 - 18)  SpO2: 96% (23 Sep 2019 05:05) (94% - 97%)           Input and Output:  I&O's Detail    21 Sep 2019 07:01  -  22 Sep 2019 07:00  --------------------------------------------------------  IN:  Total IN: 0 mL    OUT:    Incontinent per Condom Catheter: 1 mL  Total OUT: 1 mL    Total NET: -1 mL          Lab Data                        12.1   8.27  )-----------( 219      ( 22 Sep 2019 05:51 )             34.9     09-22    144  |  107  |  4<L>  ----------------------------<  107<H>  3.5   |  30  |  0.92    Ca    8.2<L>      22 Sep 2019 05:51  Phos  3.0     09-21  Mg     2.2     09-21              Review of Systems	      Objective     Physical Examination    heart s1s2  lung dec BS  abd soft      Pertinent Lab findings & Imaging      Angel:  NO   Adequate UO     I&O's Detail    21 Sep 2019 07:01  -  22 Sep 2019 07:00  --------------------------------------------------------  IN:  Total IN: 0 mL    OUT:    Incontinent per Condom Catheter: 1 mL  Total OUT: 1 mL    Total NET: -1 mL               Discussed with:     Cultures:	        Radiology

## 2019-09-23 NOTE — PROGRESS NOTE ADULT - SUBJECTIVE AND OBJECTIVE BOX
JADE VILLALOBOS is a 79yMale , patient examined and chart reviewed.    INTERVAL HPI/ OVERNIGHT EVENTS:   No events. afebrile.     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    ROS:  Unable to obtain due to : Pt' condition    Current inpatient medications :    ANTIBIOTICS/RELEVANT:  MEDICATIONS  (STANDING):  ascorbic acid 500 milliGRAM(s) Oral daily  carBAMazepine 100 milliGRAM(s) Oral two times a day  cholecalciferol 2000 Unit(s) Oral daily  dextrose 5% + lactated ringers. 1000 milliLiter(s) (75 mL/Hr) IV Continuous <Continuous>  enoxaparin Injectable 40 milliGRAM(s) SubCutaneous daily  hydrochlorothiazide 12.5 milliGRAM(s) Oral daily  influenza   Vaccine 0.5 milliLiter(s) IntraMuscular once  lactated ringers. 1000 milliLiter(s) (50 mL/Hr) IV Continuous <Continuous>  memantine 10 milliGRAM(s) Oral daily  pregabalin 50 milliGRAM(s) Oral three times a day  traZODone 50 milliGRAM(s) Oral at bedtime    MEDICATIONS  (PRN):  acetaminophen   Tablet .. 650 milliGRAM(s) Oral every 6 hours PRN Temp greater or equal to 38C (100.4F)  magnesium hydroxide Suspension 30 milliLiter(s) Oral daily PRN Constipation        Objective:  Vital Signs Last 24 Hrs  T(C): 36.6 (23 Sep 2019 13:57), Max: 37.4 (22 Sep 2019 21:14)  T(F): 97.9 (23 Sep 2019 13:57), Max: 99.4 (22 Sep 2019 21:14)  HR: 87 (23 Sep 2019 13:57) (86 - 89)  BP: 105/70 (23 Sep 2019 13:57) (105/70 - 120/78)  RR: 18 (23 Sep 2019 13:57) (17 - 18)  SpO2: 96% (23 Sep 2019 13:57) (94% - 96%)    Physical Exam:  General: no acute distress  Eyes: sclera anicteric, pupils equal and reactive to light  ENMT: buccal mucosa moist, pharynx not injected  Neck: supple, trachea midline  Lungs: Decreased, no wheeze/rhonchi  Cardiovascular: regular rate and rhythm, S1 S2  Abdomen: soft, nontender, no organomegaly present, bowel sounds normal  Neurological: awake confused  Skin: no increased ecchymosis/petechiae/purpura  Lymph Nodes: no palpable cervical/supraclavicular lymph nodes enlargements  Extremities: no cyanosis/clubbing/edema      LABS:                          12.4   7.41  )-----------( 236      ( 23 Sep 2019 08:39 )             37.3   09-23    144  |  108  |  6<L>  ----------------------------<  90  3.7   |  31  |  0.89    Ca    8.2<L>      23 Sep 2019 08:39      MICROBIOLOGY:    Culture - Blood (collected 16 Sep 2019 21:11)  Source: .Blood Blood-Venous  Preliminary Report (17 Sep 2019 22:01):    No growth to date.    Culture - Blood (collected 16 Sep 2019 21:08)  Source: .Blood Blood-Venous  Preliminary Report (17 Sep 2019 22:01):    No growth to date.    Culture - Urine (collected 16 Sep 2019 21:07)  Source: .Urine Clean Catch (Midstream)  Final Report (18 Sep 2019 18:48):    >100,000 CFU/ml Proteus mirabilis  Organism: Proteus mirabilis (18 Sep 2019 18:48)  Organism: Proteus mirabilis (18 Sep 2019 18:48)      -  Amikacin: S <=16      -  Ampicillin: S <=8 These ampicillin results predict results for amoxicillin      -  Ampicillin/Sulbactam: S <=8/4 Enterobacter, Citrobacter, and Serratia may develop resistance during prolonged therapy (3-4 days)      -  Aztreonam: S <=4      -  Cefazolin: S <=8 (MIC_CL_COM_ENTERIC_[id number]) For uncomplicated UTI with K. pneumoniae, E. coli, or P. mirablis: SELENA <=16 is sensitive and SELENA >=32 is resistant. This also predicts results for oral agents cefaclor, cefdinir, cefpodoxime, cefprozil, cefuroxime axetil, cephalexin and locarbef for uncomplicated UTI. Note that some isolates may be susceptible to these agents while testing resistant to cefazolin.      -  Cefepime: S <=4      -  Cefoxitin: S <=8      -  Ceftriaxone: S <=1 Enterobacter, Citrobacter, and Serratia may develop resistance during prolonged therapy      -  Ciprofloxacin: R >2      -  Gentamicin: S <=4      -  Levofloxacin: R >4      -  Meropenem: S <=1      -  Nitrofurantoin: R >64 Should not be used to treat pyelonephritis      -  Piperacillin/Tazobactam: S <=16      -  Tobramycin: S <=4      -  Trimethoprim/Sulfamethoxazole: S <=2/38      Method Type: SELENA    RADIOLOGY & ADDITIONAL STUDIES:    EXAM:  XR CHEST PORTABLE URGENT 1V                            PROCEDURE DATE:  09/16/2019          INTERPRETATION:  XR CHEST URGENT    Single AP view    HISTORY:  increased weakness    Comparison: Chest x-ray 5/20/2019      The cardiac silhouette is within normal limits. The lungs are clear. No   pleural abnormality.    IMPRESSION: Normal AP chest.      Assessment :   80 yo male pmx of hemorrhagic CVA on recent admission to [facility] from 5/20-5/23 , h/o amyloid angiopathy, HTN, trigeminal neuralgia, depression, dementia, admitted with mental status change sec UTI with Proteus Also with progressive functional decline with global aphasia and dysphagia. High aspiration risk. Failed MBS. Family had refused PEG in the past. WBC resolved. Failed repeat swallow study. Overall stable.    Plan :   Completed course of Rocephin x 7 days  Trend temps and wbc  Asp precautions  Will likely need PEG    D/w Dr Conklin    Continue with present regiment.  Appropriate use of antibiotics and adverse effects reviewed.      I have discussed the above plan of care with patient/ family in detail. They expressed understanding of the  treatment plan . Risks, benefits and alternatives discussed in detail. I have asked if they have any questions or concerns and appropriately addressed them to the best of my ability .    > 35 minutes were spent in direct patient care reviewing notes, medications ,labs data/ imaging , discussion with multidisciplinary team.    Thank you for allowing me to participate in care of your patient .    Gerardo Mercedes MD  Infectious Disease  [phone]

## 2019-09-23 NOTE — PROGRESS NOTE ADULT - ASSESSMENT
78 yo male pmx of hemorrhagic CVA, h/o amyloid angiopathy, dementia, HTN, candidal stomatitis, rosacea, blepharitis,chronic constipation, vitamin D deficiency, insomnia, neuralgia, depression, post herpetic neurologia, acute severe protein caloric malnutrition who presents to the ED with a cc of increasing confusion. Found with + UA and admitted with acute metabolic encephalopathy 2/2 urosepsis and now dysphagia sec to trigeminal neuralgia

## 2019-09-23 NOTE — PHARMACOTHERAPY INTERVENTION NOTE - COMMENTS
-Wife and son expressed that adding carbamazepine to pregabalin has been effective in treating trigeminal neuralgia symptoms but they are concerned about drowsiness and inability to swallow.    -Explained that drowsiness to carbamazepine may decrease once tolerance to the adverse effects develop but also explained that I will talk with dr Conklin about these concerns and will explore possible alterations to medications.

## 2019-09-23 NOTE — SWALLOW BEDSIDE ASSESSMENT ADULT - ASR SWALLOW ASPIRATION MONITOR
change of breathing pattern/cough/fever/oral hygiene/position upright (90Y)/throat clearing/gurgly voice/pneumonia/upper respiratory infection
oral hygiene/position upright (90Y)/upper respiratory infection/fever/gurgly voice/pneumonia/change of breathing pattern/oral suction as needed at patient is at increased risk of microaspiration/cough/throat clearing
change of breathing pattern/cough/fever/throat clearing/oral hygiene/position upright (90Y)/gurgly voice/pneumonia/upper respiratory infection

## 2019-09-23 NOTE — PROGRESS NOTE ADULT - PROBLEM SELECTOR PLAN 1
acute on chronic, recurrent  patient failed speech and swallow eval today; diet changed back to NPO  cont trigeminal neuralgia meds- may need to decrease Lyrica to BID tomorrow as patient's wife is concerned about lethargy   MBS tomorrow  asp precautions

## 2019-09-23 NOTE — PROGRESS NOTE ADULT - SUBJECTIVE AND OBJECTIVE BOX
INTERVAL HPI/OVERNIGHT EVENTS:  pt seen and examined  resting in bed  interim events noted, now on po diet  awaiting re eval by slp  taking po meds per nursing  labs pending    MEDICATIONS  (STANDING):  ascorbic acid 500 milliGRAM(s) Oral daily  carBAMazepine 100 milliGRAM(s) Oral two times a day  cholecalciferol 2000 Unit(s) Oral daily  dextrose 5% + lactated ringers. 1000 milliLiter(s) (75 mL/Hr) IV Continuous <Continuous>  enoxaparin Injectable 40 milliGRAM(s) SubCutaneous daily  hydrochlorothiazide 12.5 milliGRAM(s) Oral daily  influenza   Vaccine 0.5 milliLiter(s) IntraMuscular once  memantine 10 milliGRAM(s) Oral daily  pregabalin 50 milliGRAM(s) Oral three times a day  traZODone 50 milliGRAM(s) Oral at bedtime    MEDICATIONS  (PRN):  acetaminophen   Tablet .. 650 milliGRAM(s) Oral every 6 hours PRN Temp greater or equal to 38C (100.4F)  magnesium hydroxide Suspension 30 milliLiter(s) Oral daily PRN Constipation      Allergies    No Known Allergies    Intolerances        Review of Systems:    unable to obtain      Vital Signs Last 24 Hrs  T(C): 36.8 (23 Sep 2019 05:05), Max: 37.4 (22 Sep 2019 21:14)  T(F): 98.3 (23 Sep 2019 05:05), Max: 99.4 (22 Sep 2019 21:14)  HR: 89 (23 Sep 2019 05:05) (85 - 89)  BP: 120/78 (23 Sep 2019 05:05) (116/74 - 120/78)  BP(mean): --  RR: 17 (23 Sep 2019 05:05) (17 - 18)  SpO2: 96% (23 Sep 2019 05:05) (94% - 97%)    PHYSICAL EXAM:  General:  nad  HEENT:  NC/AT  Chest:   dec bs  Cardiovascular:  Regular rhythm, S1, S2  Abdomen:  Soft, non-tender, non-distended, +scar from prior peg site  Extremities:  no  edema  Skin:  No rash  Neuro/Psych:  Awake alert    LABS:                        12.1   8.27  )-----------( 219      ( 22 Sep 2019 05:51 )             34.9     09-22    144  |  107  |  4<L>  ----------------------------<  107<H>  3.5   |  30  |  0.92    Ca    8.2<L>      22 Sep 2019 05:51            RADIOLOGY & ADDITIONAL TESTS:

## 2019-09-23 NOTE — SWALLOW BEDSIDE ASSESSMENT ADULT - SWALLOW EVAL: MANDIBULAR STRENGTH AND MOBILITY
Unable to formally assess oral-motor function given patient's inability to follow low-level directives.
Unable to formally assess oral-motor function given patient's difficulty following low-level verbal and visual directives.

## 2019-09-23 NOTE — PROGRESS NOTE ADULT - ATTENDING COMMENTS
80 yo male pmx of hemorrhagic CVA, h/o amyloid angiopathy, dementia, HTN, candidal stomatitis, rosacea, blepharitis,chronic constipation, vitamin D deficiency, insomnia, neuralgia, depression, post herpetic neurologia, acute severe protein caloric malnutrition who presents to the ED with a cc of increasing confusion. Found with + UA and admitted with acute metabolic encephalopathy 2/2 urosepsis and now dysphagia sec to trigeminal neuralgia exacerbation and poss advanicng dementia PLan: apprec s/s still unable to assess full oral motor phase, npo, mbs tomorrow, may descalate on lyrica as family stating pt is lethargic with combo meds, monitor clinical course

## 2019-09-24 LAB
ANION GAP SERPL CALC-SCNC: 7 MMOL/L — SIGNIFICANT CHANGE UP (ref 5–17)
BUN SERPL-MCNC: 8 MG/DL — SIGNIFICANT CHANGE UP (ref 7–23)
CALCIUM SERPL-MCNC: 7.9 MG/DL — LOW (ref 8.5–10.1)
CHLORIDE SERPL-SCNC: 106 MMOL/L — SIGNIFICANT CHANGE UP (ref 96–108)
CO2 SERPL-SCNC: 29 MMOL/L — SIGNIFICANT CHANGE UP (ref 22–31)
CREAT SERPL-MCNC: 0.78 MG/DL — SIGNIFICANT CHANGE UP (ref 0.5–1.3)
GLUCOSE SERPL-MCNC: 82 MG/DL — SIGNIFICANT CHANGE UP (ref 70–99)
HCT VFR BLD CALC: 36.8 % — LOW (ref 39–50)
HGB BLD-MCNC: 12.5 G/DL — LOW (ref 13–17)
MCHC RBC-ENTMCNC: 30.5 PG — SIGNIFICANT CHANGE UP (ref 27–34)
MCHC RBC-ENTMCNC: 34 GM/DL — SIGNIFICANT CHANGE UP (ref 32–36)
MCV RBC AUTO: 89.8 FL — SIGNIFICANT CHANGE UP (ref 80–100)
NRBC # BLD: 0 /100 WBCS — SIGNIFICANT CHANGE UP (ref 0–0)
PLATELET # BLD AUTO: 266 K/UL — SIGNIFICANT CHANGE UP (ref 150–400)
POTASSIUM SERPL-MCNC: 3.5 MMOL/L — SIGNIFICANT CHANGE UP (ref 3.5–5.3)
POTASSIUM SERPL-SCNC: 3.5 MMOL/L — SIGNIFICANT CHANGE UP (ref 3.5–5.3)
RBC # BLD: 4.1 M/UL — LOW (ref 4.2–5.8)
RBC # FLD: 12.3 % — SIGNIFICANT CHANGE UP (ref 10.3–14.5)
SODIUM SERPL-SCNC: 142 MMOL/L — SIGNIFICANT CHANGE UP (ref 135–145)
WBC # BLD: 6.15 K/UL — SIGNIFICANT CHANGE UP (ref 3.8–10.5)
WBC # FLD AUTO: 6.15 K/UL — SIGNIFICANT CHANGE UP (ref 3.8–10.5)

## 2019-09-24 PROCEDURE — 74230 X-RAY XM SWLNG FUNCJ C+: CPT | Mod: 26

## 2019-09-24 PROCEDURE — 99233 SBSQ HOSP IP/OBS HIGH 50: CPT | Mod: GC

## 2019-09-24 RX ORDER — NYSTATIN CREAM 100000 [USP'U]/G
1 CREAM TOPICAL
Refills: 0 | Status: DISCONTINUED | OUTPATIENT
Start: 2019-09-24 | End: 2019-09-28

## 2019-09-24 RX ADMIN — ENOXAPARIN SODIUM 40 MILLIGRAM(S): 100 INJECTION SUBCUTANEOUS at 12:53

## 2019-09-24 RX ADMIN — Medication 50 MILLIGRAM(S): at 05:08

## 2019-09-24 RX ADMIN — SODIUM CHLORIDE 50 MILLILITER(S): 9 INJECTION, SOLUTION INTRAVENOUS at 23:25

## 2019-09-24 RX ADMIN — MEMANTINE HYDROCHLORIDE 10 MILLIGRAM(S): 10 TABLET ORAL at 12:53

## 2019-09-24 RX ADMIN — Medication 25 MILLIGRAM(S): at 17:48

## 2019-09-24 RX ADMIN — Medication 500 MILLIGRAM(S): at 12:53

## 2019-09-24 RX ADMIN — Medication 50 MILLIGRAM(S): at 23:25

## 2019-09-24 RX ADMIN — Medication 12.5 MILLIGRAM(S): at 05:08

## 2019-09-24 RX ADMIN — Medication 100 MILLIGRAM(S): at 12:52

## 2019-09-24 RX ADMIN — NYSTATIN CREAM 1 APPLICATION(S): 100000 CREAM TOPICAL at 17:34

## 2019-09-24 RX ADMIN — Medication 2000 UNIT(S): at 12:53

## 2019-09-24 NOTE — PROGRESS NOTE ADULT - PROBLEM SELECTOR PLAN 4
-Pt severely malnourished, Temporal, clavicular, and shoulder muscle wasting present, BMI 19.7  -dysphagia is likely 2/2 past CVAs and progressive deconditioning due to chronic disease state  -discussion w/ family on PEG tube and are amendable given patient continuously failing MBS and s/s eval; will need to update molst  -GI consulted

## 2019-09-24 NOTE — PROGRESS NOTE ADULT - SUBJECTIVE AND OBJECTIVE BOX
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 1000 milliLiter(s) (75 mL/Hr) IV Continuous <Continuous>  enoxaparin Injectable 40 milliGRAM(s) SubCutaneous daily  hydrochlorothiazide 12.5 milliGRAM(s) Oral daily  influenza   Vaccine 0.5 milliLiter(s) IntraMuscular once  lactated ringers. 1000 milliLiter(s) (50 mL/Hr) IV Continuous <Continuous>  memantine 10 milliGRAM(s) Oral daily  pregabalin 50 milliGRAM(s) Oral three times a day  traZODone 50 milliGRAM(s) Oral at bedtime    MEDICATIONS  (PRN):  acetaminophen   Tablet .. 650 milliGRAM(s) Oral every 6 hours PRN Temp greater or equal to 38C (100.4F)  magnesium hydroxide Suspension 30 milliLiter(s) Oral daily PRN Constipation         Vitals log        ICU Vital Signs Last 24 Hrs  T(C): 36.9 (24 Sep 2019 03:48), Max: 37 (23 Sep 2019 19:56)  T(F): 98.4 (24 Sep 2019 03:48), Max: 98.6 (23 Sep 2019 19:56)  HR: 80 (24 Sep 2019 03:48) (80 - 87)  BP: 131/76 (24 Sep 2019 03:48) (105/70 - 131/76)  BP(mean): --  ABP: --  ABP(mean): --  RR: 17 (24 Sep 2019 03:48) (17 - 18)  SpO2: 97% (24 Sep 2019 03:48) (96% - 97%)           Input and Output:  I&O's Detail    23 Sep 2019 07:01  -  24 Sep 2019 06:47  --------------------------------------------------------  IN:    lactated ringers.: 550 mL  Total IN: 550 mL    OUT:  Total OUT: 0 mL    Total NET: 550 mL          Lab Data                        12.4   7.41  )-----------( 236      ( 23 Sep 2019 08:39 )             37.3     09-23    144  |  108  |  6<L>  ----------------------------<  90  3.7   |  31  |  0.89    Ca    8.2<L>      23 Sep 2019 08:39              Review of Systems	      Objective     Physical Examination    heart s1s2  lung dec BS  abd soft      Pertinent Lab findings & Imaging      Angel:  NO   Adequate UO     I&O's Detail    23 Sep 2019 07:01  -  24 Sep 2019 06:47  --------------------------------------------------------  IN:    lactated ringers.: 550 mL  Total IN: 550 mL    OUT:  Total OUT: 0 mL    Total NET: 550 mL               Discussed with:     Cultures:	        Radiology

## 2019-09-24 NOTE — PROGRESS NOTE ADULT - SUBJECTIVE AND OBJECTIVE BOX
GRISELDAJADE is a 79yMale , patient examined and chart reviewed.    INTERVAL HPI/ OVERNIGHT EVENTS:   No events. afebrile.     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    ROS:  Unable to obtain due to : Pt' condition    Current inpatient medications :    MEDICATIONS  (STANDING):  ascorbic acid 500 milliGRAM(s) Oral daily  carBAMazepine 100 milliGRAM(s) Oral two times a day  cholecalciferol 2000 Unit(s) Oral daily  dextrose 5% + lactated ringers. 1000 milliLiter(s) (75 mL/Hr) IV Continuous <Continuous>  enoxaparin Injectable 40 milliGRAM(s) SubCutaneous daily  hydrochlorothiazide 12.5 milliGRAM(s) Oral daily  influenza   Vaccine 0.5 milliLiter(s) IntraMuscular once  lactated ringers. 1000 milliLiter(s) (50 mL/Hr) IV Continuous <Continuous>  memantine 10 milliGRAM(s) Oral daily  nystatin Powder 1 Application(s) Topical two times a day  pregabalin 25 milliGRAM(s) Oral two times a day  traZODone 50 milliGRAM(s) Oral at bedtime    MEDICATIONS  (PRN):  acetaminophen   Tablet .. 650 milliGRAM(s) Oral every 6 hours PRN Temp greater or equal to 38C (100.4F)  magnesium hydroxide Suspension 30 milliLiter(s) Oral daily PRN Constipation      Objective:  Vital Signs Last 24 Hrs  T(C): 37.1 (24 Sep 2019 13:32), Max: 37.1 (24 Sep 2019 13:32)  T(F): 98.8 (24 Sep 2019 13:32), Max: 98.8 (24 Sep 2019 13:32)  HR: 105 (24 Sep 2019 13:32) (80 - 105)  BP: 158/85 (24 Sep 2019 13:32) (129/74 - 158/85)  RR: 18 (24 Sep 2019 13:32) (17 - 18)  SpO2: 97% (24 Sep 2019 13:32) (96% - 97%)    Physical Exam:  General: no acute distress  Eyes: sclera anicteric, pupils equal and reactive to light  ENMT: buccal mucosa moist, pharynx not injected  Neck: supple, trachea midline  Lungs: Decreased, no wheeze/rhonchi  Cardiovascular: regular rate and rhythm, S1 S2  Abdomen: soft, nontender, no organomegaly present, bowel sounds normal  Neurological: awake confused  Skin: no increased ecchymosis/petechiae/purpura  Lymph Nodes: no palpable cervical/supraclavicular lymph nodes enlargements  Extremities: no cyanosis/clubbing/edema      LABS:                        12.5   6.15  )-----------( 266      ( 24 Sep 2019 09:24 )             36.8   09-24    142  |  106  |  8   ----------------------------<  82  3.5   |  29  |  0.78    Ca    7.9<L>      24 Sep 2019 09:24      MICROBIOLOGY:    Culture - Blood (collected 16 Sep 2019 21:11)  Source: .Blood Blood-Venous  Preliminary Report (17 Sep 2019 22:01):    No growth to date.    Culture - Blood (collected 16 Sep 2019 21:08)  Source: .Blood Blood-Venous  Preliminary Report (17 Sep 2019 22:01):    No growth to date.    Culture - Urine (collected 16 Sep 2019 21:07)  Source: .Urine Clean Catch (Midstream)  Final Report (18 Sep 2019 18:48):    >100,000 CFU/ml Proteus mirabilis  Organism: Proteus mirabilis (18 Sep 2019 18:48)  Organism: Proteus mirabilis (18 Sep 2019 18:48)      -  Amikacin: S <=16      -  Ampicillin: S <=8 These ampicillin results predict results for amoxicillin      -  Ampicillin/Sulbactam: S <=8/4 Enterobacter, Citrobacter, and Serratia may develop resistance during prolonged therapy (3-4 days)      -  Aztreonam: S <=4      -  Cefazolin: S <=8 (MIC_CL_COM_ENTERIC_CEFAZU) For uncomplicated UTI with K. pneumoniae, E. coli, or P. mirablis: SELENA <=16 is sensitive and SELENA >=32 is resistant. This also predicts results for oral agents cefaclor, cefdinir, cefpodoxime, cefprozil, cefuroxime axetil, cephalexin and locarbef for uncomplicated UTI. Note that some isolates may be susceptible to these agents while testing resistant to cefazolin.      -  Cefepime: S <=4      -  Cefoxitin: S <=8      -  Ceftriaxone: S <=1 Enterobacter, Citrobacter, and Serratia may develop resistance during prolonged therapy      -  Ciprofloxacin: R >2      -  Gentamicin: S <=4      -  Levofloxacin: R >4      -  Meropenem: S <=1      -  Nitrofurantoin: R >64 Should not be used to treat pyelonephritis      -  Piperacillin/Tazobactam: S <=16      -  Tobramycin: S <=4      -  Trimethoprim/Sulfamethoxazole: S <=2/38      Method Type: SELENA    RADIOLOGY & ADDITIONAL STUDIES:    EXAM:  XR CHEST PORTABLE URGENT 1V                            PROCEDURE DATE:  09/16/2019          INTERPRETATION:  XR CHEST URGENT    Single AP view    HISTORY:  increased weakness    Comparison: Chest x-ray 5/20/2019      The cardiac silhouette is within normal limits. The lungs are clear. No   pleural abnormality.    IMPRESSION: Normal AP chest.      Assessment :   78 yo male pmx of hemorrhagic CVA on recent admission to [facility] from 5/20-5/23 , h/o amyloid angiopathy, HTN, trigeminal neuralgia, depression, dementia, admitted with mental status change sec UTI with Proteus Also with progressive functional decline with global aphasia and dysphagia. High aspiration risk. Failed MBS. Family had refused PEG in the past. WBC resolved. Failed repeat swallow study. Overall stable.    Plan :   Completed course of Rocephin x 7 days  Trend temps and wbc  Asp precautions  Will likely need PEG    D/w Dr Conklin    Continue with present regiment.  Appropriate use of antibiotics and adverse effects reviewed.      I have discussed the above plan of care with patient/ family in detail. They expressed understanding of the  treatment plan . Risks, benefits and alternatives discussed in detail. I have asked if they have any questions or concerns and appropriately addressed them to the best of my ability .    25 minutes were spent in direct patient care reviewing notes, medications ,labs data/ imaging , discussion with multidisciplinary team.    Thank you for allowing me to participate in care of your patient .    Gerardo Mercedes MD  Infectious Disease  [phone] GRISELDAJADE is a 79yMale , patient examined and chart reviewed.    INTERVAL HPI/ OVERNIGHT EVENTS:   No events. afebrile.     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    ROS:  Unable to obtain due to : Pt' condition    Current inpatient medications :    MEDICATIONS  (STANDING):  ascorbic acid 500 milliGRAM(s) Oral daily  carBAMazepine 100 milliGRAM(s) Oral two times a day  cholecalciferol 2000 Unit(s) Oral daily  dextrose 5% + lactated ringers. 1000 milliLiter(s) (75 mL/Hr) IV Continuous <Continuous>  enoxaparin Injectable 40 milliGRAM(s) SubCutaneous daily  hydrochlorothiazide 12.5 milliGRAM(s) Oral daily  influenza   Vaccine 0.5 milliLiter(s) IntraMuscular once  lactated ringers. 1000 milliLiter(s) (50 mL/Hr) IV Continuous <Continuous>  memantine 10 milliGRAM(s) Oral daily  nystatin Powder 1 Application(s) Topical two times a day  pregabalin 25 milliGRAM(s) Oral two times a day  traZODone 50 milliGRAM(s) Oral at bedtime    MEDICATIONS  (PRN):  acetaminophen   Tablet .. 650 milliGRAM(s) Oral every 6 hours PRN Temp greater or equal to 38C (100.4F)  magnesium hydroxide Suspension 30 milliLiter(s) Oral daily PRN Constipation      Objective:  Vital Signs Last 24 Hrs  T(C): 37.1 (24 Sep 2019 13:32), Max: 37.1 (24 Sep 2019 13:32)  T(F): 98.8 (24 Sep 2019 13:32), Max: 98.8 (24 Sep 2019 13:32)  HR: 105 (24 Sep 2019 13:32) (80 - 105)  BP: 158/85 (24 Sep 2019 13:32) (129/74 - 158/85)  RR: 18 (24 Sep 2019 13:32) (17 - 18)  SpO2: 97% (24 Sep 2019 13:32) (96% - 97%)    Physical Exam:  General: no acute distress  Eyes: sclera anicteric, pupils equal and reactive to light  ENMT: buccal mucosa moist, pharynx not injected  Neck: supple, trachea midline  Lungs: Decreased, no wheeze/rhonchi  Cardiovascular: regular rate and rhythm, S1 S2  Abdomen: soft, nontender, no organomegaly present, bowel sounds normal  Neurological: awake confused  Skin: no increased ecchymosis/petechiae/purpura  Lymph Nodes: no palpable cervical/supraclavicular lymph nodes enlargements  Extremities: no cyanosis/clubbing/edema      LABS:                        12.5   6.15  )-----------( 266      ( 24 Sep 2019 09:24 )             36.8   09-24    142  |  106  |  8   ----------------------------<  82  3.5   |  29  |  0.78    Ca    7.9<L>      24 Sep 2019 09:24      MICROBIOLOGY:    Culture - Blood (collected 16 Sep 2019 21:11)  Source: .Blood Blood-Venous  Preliminary Report (17 Sep 2019 22:01):    No growth to date.    Culture - Blood (collected 16 Sep 2019 21:08)  Source: .Blood Blood-Venous  Preliminary Report (17 Sep 2019 22:01):    No growth to date.    Culture - Urine (collected 16 Sep 2019 21:07)  Source: .Urine Clean Catch (Midstream)  Final Report (18 Sep 2019 18:48):    >100,000 CFU/ml Proteus mirabilis  Organism: Proteus mirabilis (18 Sep 2019 18:48)  Organism: Proteus mirabilis (18 Sep 2019 18:48)      -  Amikacin: S <=16      -  Ampicillin: S <=8 These ampicillin results predict results for amoxicillin      -  Ampicillin/Sulbactam: S <=8/4 Enterobacter, Citrobacter, and Serratia may develop resistance during prolonged therapy (3-4 days)      -  Aztreonam: S <=4      -  Cefazolin: S <=8 (MIC_CL_COM_ENTERIC_CEFAZU) For uncomplicated UTI with K. pneumoniae, E. coli, or P. mirablis: SELENA <=16 is sensitive and SELENA >=32 is resistant. This also predicts results for oral agents cefaclor, cefdinir, cefpodoxime, cefprozil, cefuroxime axetil, cephalexin and locarbef for uncomplicated UTI. Note that some isolates may be susceptible to these agents while testing resistant to cefazolin.      -  Cefepime: S <=4      -  Cefoxitin: S <=8      -  Ceftriaxone: S <=1 Enterobacter, Citrobacter, and Serratia may develop resistance during prolonged therapy      -  Ciprofloxacin: R >2      -  Gentamicin: S <=4      -  Levofloxacin: R >4      -  Meropenem: S <=1      -  Nitrofurantoin: R >64 Should not be used to treat pyelonephritis      -  Piperacillin/Tazobactam: S <=16      -  Tobramycin: S <=4      -  Trimethoprim/Sulfamethoxazole: S <=2/38      Method Type: SELENA    RADIOLOGY & ADDITIONAL STUDIES:    EXAM:  XR CHEST PORTABLE URGENT 1V                            PROCEDURE DATE:  09/16/2019          INTERPRETATION:  XR CHEST URGENT    Single AP view    HISTORY:  increased weakness    Comparison: Chest x-ray 5/20/2019      The cardiac silhouette is within normal limits. The lungs are clear. No   pleural abnormality.    IMPRESSION: Normal AP chest.      Assessment :   80 yo male pmx of hemorrhagic CVA on recent admission to [place] from 5/20-5/23 , h/o amyloid angiopathy, HTN, trigeminal neuralgia, depression, dementia, admitted with mental status change sec UTI with Proteus Also with progressive functional decline with global aphasia and dysphagia. High aspiration risk. Failed MBS. Family had refused PEG in the past. WBC resolved. Failed repeat swallow study. Had repeat MBS today and failed sec severe dysphagia. Clinically stable.    Plan :   Completed course of Rocephin x 7 days  Trend temps and wbc  Asp precautions  Will likely need PEG    D/w Dr Conklin    Continue with present regiment.  Appropriate use of antibiotics and adverse effects reviewed.      I have discussed the above plan of care with patient/ family in detail. They expressed understanding of the  treatment plan . Risks, benefits and alternatives discussed in detail. I have asked if they have any questions or concerns and appropriately addressed them to the best of my ability .    25 minutes were spent in direct patient care reviewing notes, medications ,labs data/ imaging , discussion with multidisciplinary team.    Thank you for allowing me to participate in care of your patient .    Gerardo Mercedes MD  Infectious Disease  [phone]

## 2019-09-24 NOTE — PROGRESS NOTE ADULT - PROBLEM SELECTOR PLAN 1
cva  dysphagia  frailty  confusion  failed swallow eval   HOB elev  asp prec  oral hygiene  MBS pending  wife and son aware of current status  prognosis POOR  pt is DNR DNI  will follow  emotional support

## 2019-09-24 NOTE — SWALLOW VFSS/MBS ASSESSMENT ADULT - DEMONSTRATES NEED FOR REFERRAL TO ANOTHER SERVICE
To facilitate adequate daily caloric intake./Registered Dietitian
Registered Dietitian/To facilitate adequate daily caloric intake; ENT consult to assess laryngeal functioning

## 2019-09-24 NOTE — SWALLOW VFSS/MBS ASSESSMENT ADULT - DIAGNOSTIC IMPRESSIONS
1. The patient demonstrated a severe oral dysphagia for puree and honey thick liquid textures marked by adequate stripping of the bolus from the utensil and adequate oral acceptance with subsequent absent bolus manipulation and absent posterior transport in the oral cavity despite maximum verbal/visual/tactile prompting by this clinician and patient's wife who was also present. The bolus was observed to remain in the anterior oral cavity when fluoro was turned on.  2. The pharyngeal phase of the swallow could not be formally assessed under fluoroscopy given the severity of the oral phase deficits and patient did not elicit a pharyngeal swallow trigger.  3. Fluoro was turned back on upon removal of the bolus via Yankauer suctioning, at which time no barium was visualized remaining in the oral cavity. 1. The patient demonstrated a severe oral dysphagia for puree and honey thick liquid textures marked by adequate stripping of the bolus from the utensil and adequate oral acceptance with subsequent absent bolus manipulation and absent posterior transport in the oral cavity despite maximum verbal/visual/tactile prompting by this clinician and patient's wife who was also present. The bolus was observed to remain in the anterior oral cavity when fluoroscopy was turned on.  2. The pharyngeal phase of the swallow could not be formally assessed under fluoroscopy given the severity of the oral phase deficits and patient did not elicit a pharyngeal swallow trigger.  3. Fluoroscopy was turned back on upon removal of the bolus via Yankauer suctioning, at which time no barium was visualized remaining in the oral cavity. 1. The patient demonstrated a severe oral dysphagia for puree and honey thick liquid textures marked by adequate stripping of the bolus from the utensil and adequate oral acceptance with subsequent minimal bolus manipulation and absent posterior transport in the oral cavity despite maximum verbal/visual/tactile prompting by this clinician and patient's wife who was also present. The bolus was observed to remain in the anterior oral cavity when fluoroscopy was turned on.  2. The pharyngeal phase of the swallow could not be formally assessed under fluoroscopy given the severity of the oral phase deficits and patient did not elicit a pharyngeal swallow trigger.  3. Fluoroscopy was turned back on upon removal of the bolus via Yankauer suctioning, at which time no barium was visualized remaining in the oral cavity.

## 2019-09-24 NOTE — PROGRESS NOTE ADULT - PROBLEM SELECTOR PLAN 1
acute on chronic, recurrent  -patient failed MBS today; patient to remain NPO, started on LR IVF   -Family agreeable to PEG tube placement tomorrow; f/u with GI   -Will update MOLST form regarding tube feeds.   -cont trigeminal neuralgia meds- Lyrica changed to BID as patient's family is concerned about lethargy   -asp precautions

## 2019-09-24 NOTE — SWALLOW VFSS/MBS ASSESSMENT ADULT - COMMENTS
The patient was received in the radiology suite this AM accompanied by his wife, at which time he was awake, though nonverbal. The patient has been followed by this department during this hospital admission for PO diet candidacy. An MBS was last attempted on 9/19/19 with patient's son present (please see full report for details), at which time the patient did not elicit a pharyngeal swallow during the exam.     Per charting, "88-year-old with episodes of change in mental status, history of intracerebral hemorrhage, dementia, and trigeminal neuralgia." MD wanted a re-evaluation of swallow function given patient now started on Tegretol." Recent CXR revealed, "The cardiac silhouette is within normal limits. The lungs are clear. No pleural abnormality." Discussed results and recommendations from this evaluation with the patient's wife who was present, patient's son via telephone, KELVIN ANGELES, and Dr. Conklin.
The patient was received in the radiology suite this AM, at which time he was awake, though nonverbal. Patient accompanied to the radiology suite by his son who served as a reliable  throughout today's evaluation, as the patient's primary speaking language is Azeri, although he is nonverbal at this time given known history of aphasia. The patient was initially seen by this department for a bedside swallow evaluation on 9/18/19 (please see full report for details), at which time oral nutrition/hydration was contraindicated and a Modified Barium Swallow Study was recommended.     Per charting, the patient is a "80 yo male pmx of hemorrhagic CVA, h/o amyloid angiopathy, dementia, HTN, candidal stomatitis, rosacea, blepharitis, chronic constipation, vitamin D deficiency, insomnia, neuralgia, depression, post herpetic neurologia who presents to the ED with a cc of increasing confusion. Recently admitted to [facility] from 5/20-5/23 after pt was found to have new small amount of hemorrhage in the right posterior frontal parasagittal region.  No acute intervention was preformed.  He was noted to have a progressive functional decline with global aphasia and dysphagia.  PEG was recommend family refused.  Pt was discharged to NH.  Per wife history pt has been improving during his stay at the NH.  He had progressed to being able to ambulate with a walker and assistance and was able to tolerate soft mechanical soft diet and soon to be advanced to regular per son.  Approx 1 week ago pt had a flare of his trigeminal neuralgia and since then has had a progressive decline.  They reports that he has not been able to ambulate and has not been able to swallow for the last 3-4 days.  These symptoms were similar to his last re-bleed and so they became concerned and came to the ED for further work up." Recent CXR revealed, "The cardiac silhouette is within normal limits. The lungs are clear. No pleural abnormality." CT of the head revealed, "No acute intracranial findings."

## 2019-09-24 NOTE — SWALLOW VFSS/MBS ASSESSMENT ADULT - NS SWALLOW VFSS REC ASPIR MON
gurgly voice/change of breathing pattern/oral hygiene/position upright (90Y)/upper respiratory infection/cough/fever/pneumonia/throat clearing
oral hygiene/fever/change of breathing pattern/cough/gurgly voice/throat clearing/position upright (90Y)/pneumonia/upper respiratory infection

## 2019-09-24 NOTE — SWALLOW VFSS/MBS ASSESSMENT ADULT - RECOMMENDED CONSISTENCY
1. A PO diet could not be recommended given the patient did not elicit a pharyngeal swallow during today's Modified Barium Swallow Study.   2. Consider short-term vs long-term non-oral means of nutrition/hydration as the patient is at an increased nutritional and aspiration risk at this time.   3. Discuss the goals of care regarding the nutritional plan of care with the patient's caregivers.   4. Strict aspiration precautions  5. Strict oral care 1. A PO diet could not be recommended given the severity of the oral phase deficits and being that the pharyngeal phase of the swallow could not be formally assess during today's Modified Barium Swallow Study given the patient did not elicit a pharyngeal swallow trigger.  2. Consider short-term vs long-term non-oral means of nutrition/hydration as the patient is at an increased nutritional and aspiration risk at this time.   3. Discuss the goals of care regarding the nutritional plan of care with the patient's caregivers.   4. Strict aspiration precautions  5. Strict oral care 1. A PO diet can not be recommended at this time given the severity of the oral phase deficits and being that the pharyngeal phase of the swallow could not be formally assessed during today's Modified Barium Swallow Study.  2. Consider short-term vs long-term non-oral means of nutrition/hydration as the patient is at an increased nutritional and aspiration risk at this time.   3. Discuss the goals of care regarding the nutritional plan of care with the patient's caregivers.   4. Strict aspiration precautions  5. Strict oral care

## 2019-09-24 NOTE — PROGRESS NOTE ADULT - SUBJECTIVE AND OBJECTIVE BOX
Neurology follow up note    HANSEN [id number]      Interval History:    Patient seen with family     MEDICATIONS    acetaminophen   Tablet .. 650 milliGRAM(s) Oral every 6 hours PRN  ascorbic acid 500 milliGRAM(s) Oral daily  carBAMazepine 100 milliGRAM(s) Oral two times a day  cholecalciferol 2000 Unit(s) Oral daily  dextrose 5% + lactated ringers. 1000 milliLiter(s) IV Continuous <Continuous>  enoxaparin Injectable 40 milliGRAM(s) SubCutaneous daily  hydrochlorothiazide 12.5 milliGRAM(s) Oral daily  influenza   Vaccine 0.5 milliLiter(s) IntraMuscular once  lactated ringers. 1000 milliLiter(s) IV Continuous <Continuous>  magnesium hydroxide Suspension 30 milliLiter(s) Oral daily PRN  memantine 10 milliGRAM(s) Oral daily  nystatin Powder 1 Application(s) Topical two times a day  pregabalin 50 milliGRAM(s) Oral two times a day  traZODone 50 milliGRAM(s) Oral at bedtime      Allergies    No Known Allergies    Intolerances            Vital Signs Last 24 Hrs  T(C): 37.1 (24 Sep 2019 13:32), Max: 37.1 (24 Sep 2019 13:32)  T(F): 98.8 (24 Sep 2019 13:32), Max: 98.8 (24 Sep 2019 13:32)  HR: 105 (24 Sep 2019 13:32) (80 - 105)  BP: 158/85 (24 Sep 2019 13:32) (129/74 - 158/85)  BP(mean): --  RR: 18 (24 Sep 2019 13:32) (17 - 18)  SpO2: 97% (24 Sep 2019 13:32) (96% - 97%)      REVIEW OF SYSTEMS: Non Verbal    On Neurological Examination:    Mental Status - Patient is alert, awake    Extraocular movements appeared to be intact.      Pupils bilaterally 2 mm reactive to 1 mm.      Speech nonverbal.      Motor:  Bilateral upper; attempted to elevate arm, the patient would actively resist, pull both arms down, would say 4/5, bilateral lower extremities with plantar stimulation and flexation of hip and knee, would say 3+/5.    Sensory:  Unreliable.    Muscle tone - is normal all over.  No asymmetry is seen.      GENERAL Exam: Nontoxic , No Acute Distress   	  HEENT:  normocephalic, atraumatic  		  LUNGS:  Decreased bilaterally  	  HEART: Normal S1S2   No murmur RRR        	  GI/ ABDOMEN:  Soft  Non tender    EXTREMITIES:   No Edema  No Clubbing  No Cyanosis   	   SKIN: Normal  No Ecchymosis             LABS:  CBC Full  -  ( 24 Sep 2019 09:24 )  WBC Count : 6.15 K/uL  RBC Count : 4.10 M/uL  Hemoglobin : 12.5 g/dL  Hematocrit : 36.8 %  Platelet Count - Automated : 266 K/uL  Mean Cell Volume : 89.8 fl  Mean Cell Hemoglobin : 30.5 pg  Mean Cell Hemoglobin Concentration : 34.0 gm/dL  Auto Neutrophil # : x  Auto Lymphocyte # : x  Auto Monocyte # : x  Auto Eosinophil # : x  Auto Basophil # : x  Auto Neutrophil % : x  Auto Lymphocyte % : x  Auto Monocyte % : x  Auto Eosinophil % : x  Auto Basophil % : x      09-24    142  |  106  |  8   ----------------------------<  82  3.5   |  29  |  0.78    Ca    7.9<L>      24 Sep 2019 09:24      Hemoglobin A1C:       Vitamin B12         RADIOLOGY        ANALYSIS AND PLAN:  An 88-year-old with episodes of change in mental status, history of intracerebral hemorrhage, dementia, and trigeminal neuralgia.  1.	For episode of change in mental status, possibly suspect multifactorial secondary to underlying infectious type process, UTI, and poor oral intake.  2.	Would recommend antibiotics as needed.  3.	For history of intracerebral hemorrhage, would recommend supportive therapy.  Repeat CAT scan does not show any new signs of intracerebral hemorrhage, limit the use of blood-thinning medications if possible.  4.	For history of dementia, continue the patient on Namenda 10 mg once a day.  5.	For history of trigeminal neuralgia, the patient is on Lyrica, I will add Tegretol to see if that will help.  6.	Aspiration precautions.  7.	Spoke with the son, Geovanni, at the bedside, telephone number [phone]. 9/23/19  8.	possible PEG placement if continue to have poor oral intake   9.	will decrease Lyrica to 25 bid     Greater than 40 minutes spent in direct patient care reviewing  the notes, lab data/ imaging , discussion with multidisciplinary team.

## 2019-09-24 NOTE — PROGRESS NOTE ADULT - SUBJECTIVE AND OBJECTIVE BOX
INTERVAL HPI/OVERNIGHT EVENTS:  pt seen and examined  resting in bed  failed bedside eval, npo, awaiting mbs    MEDICATIONS  (STANDING):  ascorbic acid 500 milliGRAM(s) Oral daily  carBAMazepine 100 milliGRAM(s) Oral two times a day  cholecalciferol 2000 Unit(s) Oral daily  dextrose 5% + lactated ringers. 1000 milliLiter(s) (75 mL/Hr) IV Continuous <Continuous>  enoxaparin Injectable 40 milliGRAM(s) SubCutaneous daily  hydrochlorothiazide 12.5 milliGRAM(s) Oral daily  influenza   Vaccine 0.5 milliLiter(s) IntraMuscular once  lactated ringers. 1000 milliLiter(s) (50 mL/Hr) IV Continuous <Continuous>  memantine 10 milliGRAM(s) Oral daily  pregabalin 50 milliGRAM(s) Oral two times a day  traZODone 50 milliGRAM(s) Oral at bedtime    MEDICATIONS  (PRN):  acetaminophen   Tablet .. 650 milliGRAM(s) Oral every 6 hours PRN Temp greater or equal to 38C (100.4F)  magnesium hydroxide Suspension 30 milliLiter(s) Oral daily PRN Constipation      Allergies    No Known Allergies    Intolerances        Review of Systems:    unable to obtain    Vital Signs Last 24 Hrs  T(C): 36.9 (24 Sep 2019 03:48), Max: 37 (23 Sep 2019 19:56)  T(F): 98.4 (24 Sep 2019 03:48), Max: 98.6 (23 Sep 2019 19:56)  HR: 80 (24 Sep 2019 03:48) (80 - 87)  BP: 131/76 (24 Sep 2019 03:48) (105/70 - 131/76)  BP(mean): --  RR: 17 (24 Sep 2019 03:48) (17 - 18)  SpO2: 97% (24 Sep 2019 03:48) (96% - 97%)    PHYSICAL EXAM:    General:  nad  HEENT:  NC/AT  Chest:   dec bs  Cardiovascular:  Regular rhythm, S1, S2  Abdomen:  Soft, non-tender, non-distended, +scar from prior peg site  Extremities:  no  edema  Skin:  No rash  Neuro/Psych:  Awake alert        LABS:                        12.5   6.15  )-----------( 266      ( 24 Sep 2019 09:24 )             36.8     09-24    142  |  106  |  8   ----------------------------<  82  3.5   |  29  |  0.78    Ca    7.9<L>      24 Sep 2019 09:24            RADIOLOGY & ADDITIONAL TESTS:

## 2019-09-24 NOTE — PROGRESS NOTE ADULT - PROBLEM SELECTOR PLAN 1
multifactorial  f/u mbs results  npo/ivf  correct elytes prn  rec ppi daily  aspiration/gerd prec, elevate hob  dw wife/son at bedside 9/21, they do not want ngt in interim, per discussion, seem to be leaning towards peg if pt fails mbs; pall care following  will follow

## 2019-09-24 NOTE — PROGRESS NOTE ADULT - ATTENDING COMMENTS
78 yo male pmx of hemorrhagic CVA, h/o amyloid angiopathy, dementia, HTN, candidal stomatitis, rosacea, blepharitis,chronic constipation, vitamin D deficiency, insomnia, neuralgia, depression, post herpetic neurologia, acute severe protein caloric malnutrition who presents to the ED with a cc of increasing confusion. Found with + UA and admitted with acute metabolic encephalopathy 2/2 urosepsis and now dysphagia sec to trigeminal neuralgia exacerbation and poss advanicng dementia PLan: appre s/s eval, PEG for thursday, nutrition re-consult thursday for peg feed management in collaboration with GI, dc planning back to [facility] once peg placed and peg feeds tolerated, descalate lyrica vs tegetrol as per family request due to subjective somnolence in patient, apprec neuro recs

## 2019-09-24 NOTE — PROGRESS NOTE ADULT - SUBJECTIVE AND OBJECTIVE BOX
Patient is a 79y old  Male who presents with a chief complaint of AMS/uti (24 Sep 2019 10:09)      FROM ADMISSION H+P:   HPI:  80 yo male pmx of hemorrhagic CVA, h/o amyloid angiopathy, dementia, HTN, candidal stomatitis, rosacea, blepharitis, chronic constipation, vitamin D deficiency, insomnia, neuralgia, depression, post herpetic neurologia who presents to the ED with a cc of increasing confusion.   Recently admitted to [facility] from 5/20-5/23 after pt was found to have new small amount of hemorrhage in the right posterior frontal parasagittal region.  No acute intervention was preformed.  He was noted to have a progressive functional decline with global aphasia and dysphagia.  PEG was recommend family refused.  Pt was discharged to NH.  Per wife history pt has been improving during his stay at the NH.  He had progressed to being able to ambulate with a walker and assistance and was able to tolerate soft mechanical soft diet and soon to be advanced to regular per son.  Approx 1 week ago pt had a flare of his trigeminal neuralgia and since then has had a progressive decline.  They reports that he has not been able to ambulate and has not been able to swallow for the last 3-4 days.  These symptoms were similar to his last re-bleed and so they became concerned and came to the ED for further work up. (16 Sep 2019 21:26)      ----  INTERVAL HPI/OVERNIGHT EVENTS: Pt seen and evaluated at the bedside. No acute overnight events occurred. Patient's wife at bedside; she states that patient appears more lethargic since being his Lyrica dose has been increased. Patient to have MBS done today; patient failed. Patient's family is agreeable to PEG tube placement tomorrow. MOLST form to be updated in chart.      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 1st 5 yrs ago and 2 subsequent most recently on labor day  Amyloid disease  No significant past surgical history      FAMILY HISTORY:  No pertinent family history in first degree relatives      Allergies    No Known Allergies    Intolerances        ----  REVIEW OF SYSTEMS:  unable to obtain due to altered mental status     ----  PHYSICAL EXAM:  GENERAL: elderly frail patient, NAD, appropriately interactive  LUNGS: good air entry bilaterally, clear to auscultation, symmetric breath sounds, no wheezing or rhonchi appreciated  HEART: soft S1/S2, regular rate and rhythm, no murmurs noted, no noted edema to b/l LE  GASTROINTESTINAL: abdomen is soft, nontender, nondistended, normoactive bowel sounds, no palpable masses  NEUROLOGIC: awake, alert, oriented x 1, good muscle tone in 4 extremities, motor strength 2/5 in b/l upper and lower extremities       T(C): 36.9 (09-24-19 @ 03:48), Max: 37 (09-23-19 @ 19:56)  HR: 80 (09-24-19 @ 03:48) (80 - 87)  BP: 131/76 (09-24-19 @ 03:48) (105/70 - 131/76)  RR: 17 (09-24-19 @ 03:48) (17 - 18)  SpO2: 97% (09-24-19 @ 03:48) (96% - 97%)  Wt(kg): --    ----  I&O's Summary    23 Sep 2019 07:01  -  24 Sep 2019 07:00  --------------------------------------------------------  IN: 550 mL / OUT: 0 mL / NET: 550 mL        LABS:                        12.5   6.15  )-----------( 266      ( 24 Sep 2019 09:24 )             36.8     09-24    142  |  106  |  8   ----------------------------<  82  3.5   |  29  |  0.78    Ca    7.9<L>      24 Sep 2019 09:24

## 2019-09-25 ENCOUNTER — TRANSCRIPTION ENCOUNTER (OUTPATIENT)
Age: 79
End: 2019-09-25

## 2019-09-25 LAB
ANION GAP SERPL CALC-SCNC: 9 MMOL/L — SIGNIFICANT CHANGE UP (ref 5–17)
BUN SERPL-MCNC: 9 MG/DL — SIGNIFICANT CHANGE UP (ref 7–23)
CALCIUM SERPL-MCNC: 8.8 MG/DL — SIGNIFICANT CHANGE UP (ref 8.5–10.1)
CHLORIDE SERPL-SCNC: 101 MMOL/L — SIGNIFICANT CHANGE UP (ref 96–108)
CO2 SERPL-SCNC: 28 MMOL/L — SIGNIFICANT CHANGE UP (ref 22–31)
CREAT SERPL-MCNC: 0.73 MG/DL — SIGNIFICANT CHANGE UP (ref 0.5–1.3)
GLUCOSE SERPL-MCNC: 77 MG/DL — SIGNIFICANT CHANGE UP (ref 70–99)
HCT VFR BLD CALC: 40.5 % — SIGNIFICANT CHANGE UP (ref 39–50)
HGB BLD-MCNC: 13.9 G/DL — SIGNIFICANT CHANGE UP (ref 13–17)
MCHC RBC-ENTMCNC: 30.2 PG — SIGNIFICANT CHANGE UP (ref 27–34)
MCHC RBC-ENTMCNC: 34.3 GM/DL — SIGNIFICANT CHANGE UP (ref 32–36)
MCV RBC AUTO: 87.9 FL — SIGNIFICANT CHANGE UP (ref 80–100)
NRBC # BLD: 0 /100 WBCS — SIGNIFICANT CHANGE UP (ref 0–0)
PLATELET # BLD AUTO: 271 K/UL — SIGNIFICANT CHANGE UP (ref 150–400)
POTASSIUM SERPL-MCNC: 3.7 MMOL/L — SIGNIFICANT CHANGE UP (ref 3.5–5.3)
POTASSIUM SERPL-SCNC: 3.7 MMOL/L — SIGNIFICANT CHANGE UP (ref 3.5–5.3)
RBC # BLD: 4.61 M/UL — SIGNIFICANT CHANGE UP (ref 4.2–5.8)
RBC # FLD: 12.1 % — SIGNIFICANT CHANGE UP (ref 10.3–14.5)
SODIUM SERPL-SCNC: 138 MMOL/L — SIGNIFICANT CHANGE UP (ref 135–145)
WBC # BLD: 6.44 K/UL — SIGNIFICANT CHANGE UP (ref 3.8–10.5)
WBC # FLD AUTO: 6.44 K/UL — SIGNIFICANT CHANGE UP (ref 3.8–10.5)

## 2019-09-25 PROCEDURE — 99233 SBSQ HOSP IP/OBS HIGH 50: CPT | Mod: GC

## 2019-09-25 RX ADMIN — Medication 25 MILLIGRAM(S): at 17:21

## 2019-09-25 RX ADMIN — Medication 2000 UNIT(S): at 11:02

## 2019-09-25 RX ADMIN — Medication 100 MILLIGRAM(S): at 21:25

## 2019-09-25 RX ADMIN — Medication 500 MILLIGRAM(S): at 11:03

## 2019-09-25 RX ADMIN — MEMANTINE HYDROCHLORIDE 10 MILLIGRAM(S): 10 TABLET ORAL at 11:03

## 2019-09-25 RX ADMIN — SODIUM CHLORIDE 50 MILLILITER(S): 9 INJECTION, SOLUTION INTRAVENOUS at 13:42

## 2019-09-25 RX ADMIN — NYSTATIN CREAM 1 APPLICATION(S): 100000 CREAM TOPICAL at 17:21

## 2019-09-25 RX ADMIN — SODIUM CHLORIDE 50 MILLILITER(S): 9 INJECTION, SOLUTION INTRAVENOUS at 21:24

## 2019-09-25 RX ADMIN — NYSTATIN CREAM 1 APPLICATION(S): 100000 CREAM TOPICAL at 05:06

## 2019-09-25 RX ADMIN — ENOXAPARIN SODIUM 40 MILLIGRAM(S): 100 INJECTION SUBCUTANEOUS at 11:03

## 2019-09-25 RX ADMIN — Medication 100 MILLIGRAM(S): at 11:03

## 2019-09-25 RX ADMIN — Medication 50 MILLIGRAM(S): at 21:23

## 2019-09-25 NOTE — PROGRESS NOTE ADULT - PROBLEM SELECTOR PLAN 1
cva, dementia, dysphagia  planned for PEG - failed multiple attempts at swallow eval  HOB elev  asp prec  oral hygiene  assist with ADL  pt is DNR DNI  will follow  education and counseling provided - family aware of PEG - pros and cons  GI following

## 2019-09-25 NOTE — PROGRESS NOTE ADULT - SUBJECTIVE AND OBJECTIVE BOX
Date/Time Patient Seen:  		  Referring MD:   Data Reviewed	       Patient is a 79y old  Male who presents with a chief complaint of AMS/uti (24 Sep 2019 17:00)      Subjective/HPI     PAST MEDICAL & SURGICAL HISTORY:  Dementia  CVA (cerebral infarction): 3 previous hemorrhagic strokes secondary to amyloid disease 1st 5 yrs ago and 2 subsequent most recently on labor day  Amyloid disease  No significant past surgical history        Medication list         MEDICATIONS  (STANDING):  ascorbic acid 500 milliGRAM(s) Oral daily  carBAMazepine 100 milliGRAM(s) Oral two times a day  cholecalciferol 2000 Unit(s) Oral daily  dextrose 5% + lactated ringers. 1000 milliLiter(s) (75 mL/Hr) IV Continuous <Continuous>  enoxaparin Injectable 40 milliGRAM(s) SubCutaneous daily  hydrochlorothiazide 12.5 milliGRAM(s) Oral daily  influenza   Vaccine 0.5 milliLiter(s) IntraMuscular once  lactated ringers. 1000 milliLiter(s) (50 mL/Hr) IV Continuous <Continuous>  memantine 10 milliGRAM(s) Oral daily  nystatin Powder 1 Application(s) Topical two times a day  pregabalin 25 milliGRAM(s) Oral two times a day  traZODone 50 milliGRAM(s) Oral at bedtime    MEDICATIONS  (PRN):  acetaminophen   Tablet .. 650 milliGRAM(s) Oral every 6 hours PRN Temp greater or equal to 38C (100.4F)  magnesium hydroxide Suspension 30 milliLiter(s) Oral daily PRN Constipation         Vitals log        ICU Vital Signs Last 24 Hrs  T(C): 36.5 (25 Sep 2019 04:59), Max: 37.1 (24 Sep 2019 13:32)  T(F): 97.7 (25 Sep 2019 04:59), Max: 98.8 (24 Sep 2019 13:32)  HR: 80 (25 Sep 2019 04:59) (80 - 105)  BP: 142/82 (25 Sep 2019 04:59) (132/78 - 158/85)  BP(mean): --  ABP: --  ABP(mean): --  RR: 16 (25 Sep 2019 04:59) (16 - 18)  SpO2: 97% (25 Sep 2019 04:59) (97% - 97%)           Input and Output:  I&O's Detail    23 Sep 2019 07:01  -  24 Sep 2019 07:00  --------------------------------------------------------  IN:    lactated ringers.: 550 mL  Total IN: 550 mL    OUT:  Total OUT: 0 mL    Total NET: 550 mL      24 Sep 2019 07:01  -  25 Sep 2019 06:03  --------------------------------------------------------  IN:    lactated ringers.: 550 mL  Total IN: 550 mL    OUT:  Total OUT: 0 mL    Total NET: 550 mL          Lab Data                        12.5   6.15  )-----------( 266      ( 24 Sep 2019 09:24 )             36.8     09-24    142  |  106  |  8   ----------------------------<  82  3.5   |  29  |  0.78    Ca    7.9<L>      24 Sep 2019 09:24              Review of Systems	      Objective     Physical Examination    heart s1s2  lung dec BS  abd soft      Pertinent Lab findings & Imaging      Angel:  NO   Adequate UO     I&O's Detail    23 Sep 2019 07:01  -  24 Sep 2019 07:00  --------------------------------------------------------  IN:    lactated ringers.: 550 mL  Total IN: 550 mL    OUT:  Total OUT: 0 mL    Total NET: 550 mL      24 Sep 2019 07:01  -  25 Sep 2019 06:03  --------------------------------------------------------  IN:    lactated ringers.: 550 mL  Total IN: 550 mL    OUT:  Total OUT: 0 mL    Total NET: 550 mL               Discussed with:     Cultures:	        Radiology

## 2019-09-25 NOTE — PROGRESS NOTE ADULT - PROBLEM SELECTOR PLAN 1
multifactorial  npo/ivf  correct elytes prn  rec ppi daily  aspiration/gerd prec, elevate hob  peg in am  will follow

## 2019-09-25 NOTE — PROGRESS NOTE ADULT - PROBLEM SELECTOR PLAN 1
acute on chronic, recurrent  -patient s/p multiple failed MBS and S/S; patient to remain NPO, started on LR IVF   -Family agreeable to PEG tube placement tomorrow; f/u with GI   -MOLST form updated to tube feeds   -cont trigeminal neuralgia meds- Lyrica changed to BID as patient's family is concerned about lethargy   -Continue Tegretol   -asp precautions

## 2019-09-25 NOTE — PROGRESS NOTE ADULT - SUBJECTIVE AND OBJECTIVE BOX
Neurology follow up note    HANSEN [id number]      Interval History:    Patient resting in bed     MEDICATIONS    acetaminophen   Tablet .. 650 milliGRAM(s) Oral every 6 hours PRN  ascorbic acid 500 milliGRAM(s) Oral daily  carBAMazepine 100 milliGRAM(s) Oral two times a day  cholecalciferol 2000 Unit(s) Oral daily  dextrose 5% + lactated ringers. 1000 milliLiter(s) IV Continuous <Continuous>  enoxaparin Injectable 40 milliGRAM(s) SubCutaneous daily  hydrochlorothiazide 12.5 milliGRAM(s) Oral daily  influenza   Vaccine 0.5 milliLiter(s) IntraMuscular once  lactated ringers. 1000 milliLiter(s) IV Continuous <Continuous>  magnesium hydroxide Suspension 30 milliLiter(s) Oral daily PRN  memantine 10 milliGRAM(s) Oral daily  nystatin Powder 1 Application(s) Topical two times a day  pregabalin 25 milliGRAM(s) Oral two times a day  traZODone 50 milliGRAM(s) Oral at bedtime      Allergies    No Known Allergies    Intolerances            Vital Signs Last 24 Hrs  T(C): 36.5 (25 Sep 2019 04:59), Max: 37.1 (24 Sep 2019 13:32)  T(F): 97.7 (25 Sep 2019 04:59), Max: 98.8 (24 Sep 2019 13:32)  HR: 80 (25 Sep 2019 04:59) (80 - 105)  BP: 142/82 (25 Sep 2019 04:59) (132/78 - 158/85)  BP(mean): --  RR: 16 (25 Sep 2019 04:59) (16 - 18)  SpO2: 97% (25 Sep 2019 04:59) (97% - 97%)      REVIEW OF SYSTEMS: Non Verbal    On Neurological Examination:    Mental Status - Patient is alert, awake    Extraocular movements appeared to be intact.      Pupils bilaterally 2 mm reactive to 1 mm.      Speech nonverbal.      Motor:  Bilateral upper; attempted to elevate arm, the patient would actively resist, pull both arms down, would say 4/5, bilateral lower extremities with plantar stimulation and flexation of hip and knee, would say 3+/5.    Sensory:  Unreliable.    Muscle tone - is normal all over.  No asymmetry is seen.      GENERAL Exam: Nontoxic , No Acute Distress   	  HEENT:  normocephalic, atraumatic  		  LUNGS:  Decreased bilaterally  	  HEART: Normal S1S2   No murmur RRR        	  GI/ ABDOMEN:  Soft  Non tender    EXTREMITIES:   No Edema  No Clubbing  No Cyanosis   	   SKIN: Normal  No Ecchymosis                LABS:  CBC Full  -  ( 25 Sep 2019 08:16 )  WBC Count : 6.44 K/uL  RBC Count : 4.61 M/uL  Hemoglobin : 13.9 g/dL  Hematocrit : 40.5 %  Platelet Count - Automated : 271 K/uL  Mean Cell Volume : 87.9 fl  Mean Cell Hemoglobin : 30.2 pg  Mean Cell Hemoglobin Concentration : 34.3 gm/dL  Auto Neutrophil # : x  Auto Lymphocyte # : x  Auto Monocyte # : x  Auto Eosinophil # : x  Auto Basophil # : x  Auto Neutrophil % : x  Auto Lymphocyte % : x  Auto Monocyte % : x  Auto Eosinophil % : x  Auto Basophil % : x      09-25    138  |  101  |  9   ----------------------------<  77  3.7   |  28  |  0.73    Ca    8.8      25 Sep 2019 08:16      Hemoglobin A1C:       Vitamin B12         RADIOLOGY      ANALYSIS AND PLAN:  An 88-year-old with episodes of change in mental status, history of intracerebral hemorrhage, dementia, and trigeminal neuralgia.  1.	For episode of change in mental status, possibly suspect multifactorial secondary to underlying infectious type process, UTI, and poor oral intake.  2.	Would recommend antibiotics as needed.  3.	For history of intracerebral hemorrhage, would recommend supportive therapy.  Repeat CAT scan does not show any new signs of intracerebral hemorrhage, limit the use of blood-thinning medications if possible.  4.	For history of dementia, continue the patient on Namenda 10 mg once a day.  5.	For history of trigeminal neuralgia, the patient is on Lyrica, I will add Tegretol to see if that will help.  6.	Aspiration precautions.  7.	Spoke with the son, Geovanni, at the bedside, telephone number [phone]. 9/25/19  8.	possible PEG placement if continue to have poor oral intake   9.	will decrease Lyrica to 25 bid     Greater than 40 minutes spent in direct patient care reviewing  the notes, lab data/ imaging , discussion with multidisciplinary team.

## 2019-09-25 NOTE — PROGRESS NOTE ADULT - SUBJECTIVE AND OBJECTIVE BOX
Patient is a 79y old  Male who presents with a chief complaint of AMS/uti (25 Sep 2019 14:37)      FROM ADMISSION H+P:   HPI:  80 yo male pmx of hemorrhagic CVA, h/o amyloid angiopathy, dementia, HTN, candidal stomatitis, rosacea, blepharitis, chronic constipation, vitamin D deficiency, insomnia, neuralgia, depression, post herpetic neurologia who presents to the ED with a cc of increasing confusion.   Recently admitted to [place] from 5/20-5/23 after pt was found to have new small amount of hemorrhage in the right posterior frontal parasagittal region.  No acute intervention was preformed.  He was noted to have a progressive functional decline with global aphasia and dysphagia.  PEG was recommend family refused.  Pt was discharged to NH.  Per wife history pt has been improving during his stay at the NH.  He had progressed to being able to ambulate with a walker and assistance and was able to tolerate soft mechanical soft diet and soon to be advanced to regular per son.  Approx 1 week ago pt had a flare of his trigeminal neuralgia and since then has had a progressive decline.  They reports that he has not been able to ambulate and has not been able to swallow for the last 3-4 days.  These symptoms were similar to his last re-bleed and so they became concerned and came to the ED for further work up. (16 Sep 2019 21:26)      ----  INTERVAL HPI/OVERNIGHT EVENTS: Pt seen and evaluated at the bedside. No acute overnight events occurred. Patient is due for PEG placement tomorrow. Family is agreeable to plan and MOLST is updated in chart.     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 1st 5 yrs ago and 2 subsequent most recently on labor day  Amyloid disease  No significant past surgical history      FAMILY HISTORY:  No pertinent family history in first degree relatives      Allergies    No Known Allergies    Intolerances        ----  REVIEW OF SYSTEMS:  unable to obtain due to altered mental status     ----  PHYSICAL EXAM:  GENERAL: elderly frail patient, NAD, appropriately interactive  LUNGS: good air entry bilaterally, clear to auscultation, symmetric breath sounds, no wheezing or rhonchi appreciated  HEART: soft S1/S2, regular rate and rhythm, no murmurs noted, no noted edema to b/l LE  GASTROINTESTINAL: abdomen is soft, nontender, nondistended, normoactive bowel sounds, no palpable masses  NEUROLOGIC: awake, alert, oriented x 1, good muscle tone in 4 extremities, motor strength 2/5 in b/l upper and lower extremities     T(C): 36.7 (09-25-19 @ 13:32), Max: 36.7 (09-24-19 @ 20:23)  HR: 85 (09-25-19 @ 13:32) (80 - 85)  BP: 135/77 (09-25-19 @ 13:32) (132/78 - 142/82)  RR: 17 (09-25-19 @ 13:32) (16 - 18)  SpO2: 98% (09-25-19 @ 13:32) (97% - 98%)  Wt(kg): --    ----  I&O's Summary    24 Sep 2019 07:01  -  25 Sep 2019 07:00  --------------------------------------------------------  IN: 600 mL / OUT: 0 mL / NET: 600 mL        LABS:                        13.9   6.44  )-----------( 271      ( 25 Sep 2019 08:16 )             40.5     09-25    138  |  101  |  9   ----------------------------<  77  3.7   |  28  |  0.73    Ca    8.8      25 Sep 2019 08:16          CAPILLARY BLOOD GLUCOSE

## 2019-09-25 NOTE — PROGRESS NOTE ADULT - SUBJECTIVE AND OBJECTIVE BOX
INTERVAL HPI/OVERNIGHT EVENTS:  pt seen and examined  family requesting PEG    MEDICATIONS  (STANDING):  ascorbic acid 500 milliGRAM(s) Oral daily  carBAMazepine 100 milliGRAM(s) Oral two times a day  cholecalciferol 2000 Unit(s) Oral daily  dextrose 5% + lactated ringers. 1000 milliLiter(s) (75 mL/Hr) IV Continuous <Continuous>  enoxaparin Injectable 40 milliGRAM(s) SubCutaneous daily  hydrochlorothiazide 12.5 milliGRAM(s) Oral daily  influenza   Vaccine 0.5 milliLiter(s) IntraMuscular once  lactated ringers. 1000 milliLiter(s) (50 mL/Hr) IV Continuous <Continuous>  memantine 10 milliGRAM(s) Oral daily  pregabalin 50 milliGRAM(s) Oral two times a day  traZODone 50 milliGRAM(s) Oral at bedtime    MEDICATIONS  (PRN):  acetaminophen   Tablet .. 650 milliGRAM(s) Oral every 6 hours PRN Temp greater or equal to 38C (100.4F)  magnesium hydroxide Suspension 30 milliLiter(s) Oral daily PRN Constipation      Allergies    No Known Allergies    Intolerances        Review of Systems:    unable to obtain    Vital Signs Last 24 Hrs  T(C): 36.9 (24 Sep 2019 03:48), Max: 37 (23 Sep 2019 19:56)  T(F): 98.4 (24 Sep 2019 03:48), Max: 98.6 (23 Sep 2019 19:56)  HR: 80 (24 Sep 2019 03:48) (80 - 87)  BP: 131/76 (24 Sep 2019 03:48) (105/70 - 131/76)  BP(mean): --  RR: 17 (24 Sep 2019 03:48) (17 - 18)  SpO2: 97% (24 Sep 2019 03:48) (96% - 97%)    PHYSICAL EXAM:    General:  nad  HEENT:  NC/AT  Chest:   dec bs  Cardiovascular:  Regular rhythm, S1, S2  Abdomen:  Soft, non-tender, non-distended, +scar from prior peg site  Extremities:  no  edema  Skin:  No rash  Neuro/Psych:  Awake alert        LABS:                        12.5   6.15  )-----------( 266      ( 24 Sep 2019 09:24 )             36.8     09-24    142  |  106  |  8   ----------------------------<  82  3.5   |  29  |  0.78    Ca    7.9<L>      24 Sep 2019 09:24            RADIOLOGY & ADDITIONAL TESTS:

## 2019-09-25 NOTE — PROGRESS NOTE ADULT - SUBJECTIVE AND OBJECTIVE BOX
GRISELDAJADE is a 79yMale , patient examined and chart reviewed.    INTERVAL HPI/ OVERNIGHT EVENTS:   No events. afebrile.     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    ROS:  Unable to obtain due to : Pt' condition    Current inpatient medications :    MEDICATIONS  (STANDING):  ascorbic acid 500 milliGRAM(s) Oral daily  carBAMazepine 100 milliGRAM(s) Oral two times a day  cholecalciferol 2000 Unit(s) Oral daily  dextrose 5% + lactated ringers. 1000 milliLiter(s) (75 mL/Hr) IV Continuous <Continuous>  enoxaparin Injectable 40 milliGRAM(s) SubCutaneous daily  hydrochlorothiazide 12.5 milliGRAM(s) Oral daily  influenza   Vaccine 0.5 milliLiter(s) IntraMuscular once  lactated ringers. 1000 milliLiter(s) (50 mL/Hr) IV Continuous <Continuous>  memantine 10 milliGRAM(s) Oral daily  nystatin Powder 1 Application(s) Topical two times a day  pregabalin 25 milliGRAM(s) Oral two times a day  traZODone 50 milliGRAM(s) Oral at bedtime    MEDICATIONS  (PRN):  acetaminophen   Tablet .. 650 milliGRAM(s) Oral every 6 hours PRN Temp greater or equal to 38C (100.4F)  magnesium hydroxide Suspension 30 milliLiter(s) Oral daily PRN Constipation    Objective:  Vital Signs Last 24 Hrs  T(C): 36.7 (25 Sep 2019 13:32), Max: 36.7 (24 Sep 2019 20:23)  T(F): 98 (25 Sep 2019 13:32), Max: 98.1 (24 Sep 2019 20:23)  HR: 85 (25 Sep 2019 13:32) (80 - 85)  BP: 135/77 (25 Sep 2019 13:32) (132/78 - 142/82)  RR: 17 (25 Sep 2019 13:32) (16 - 18)  SpO2: 98% (25 Sep 2019 13:32) (97% - 98%)    Physical Exam:  General: no acute distress  Eyes: sclera anicteric, pupils equal and reactive to light  ENMT: buccal mucosa moist, pharynx not injected  Neck: supple, trachea midline  Lungs: Decreased, no wheeze/rhonchi  Cardiovascular: regular rate and rhythm, S1 S2  Abdomen: soft, nontender, no organomegaly present, bowel sounds normal  Neurological: awake confused  Skin: no increased ecchymosis/petechiae/purpura  Lymph Nodes: no palpable cervical/supraclavicular lymph nodes enlargements  Extremities: no cyanosis/clubbing/edema      LABS:                        13.9   6.44  )-----------( 271      ( 25 Sep 2019 08:16 )             40.5   09-25    138  |  101  |  9   ----------------------------<  77  3.7   |  28  |  0.73    Ca    8.8      25 Sep 2019 08:16    MICROBIOLOGY:    Culture - Blood (collected 16 Sep 2019 21:11)  Source: .Blood Blood-Venous  Preliminary Report (17 Sep 2019 22:01):    No growth to date.    Culture - Blood (collected 16 Sep 2019 21:08)  Source: .Blood Blood-Venous  Preliminary Report (17 Sep 2019 22:01):    No growth to date.    Culture - Urine (collected 16 Sep 2019 21:07)  Source: .Urine Clean Catch (Midstream)  Final Report (18 Sep 2019 18:48):    >100,000 CFU/ml Proteus mirabilis  Organism: Proteus mirabilis (18 Sep 2019 18:48)  Organism: Proteus mirabilis (18 Sep 2019 18:48)      -  Amikacin: S <=16      -  Ampicillin: S <=8 These ampicillin results predict results for amoxicillin      -  Ampicillin/Sulbactam: S <=8/4 Enterobacter, Citrobacter, and Serratia may develop resistance during prolonged therapy (3-4 days)      -  Aztreonam: S <=4      -  Cefazolin: S <=8 (MIC_CL_COM_ENTERIC_CEFAZU) For uncomplicated UTI with K. pneumoniae, E. coli, or P. mirablis: SELENA <=16 is sensitive and SELENA >=32 is resistant. This also predicts results for oral agents cefaclor, cefdinir, cefpodoxime, cefprozil, cefuroxime axetil, cephalexin and locarbef for uncomplicated UTI. Note that some isolates may be susceptible to these agents while testing resistant to cefazolin.      -  Cefepime: S <=4      -  Cefoxitin: S <=8      -  Ceftriaxone: S <=1 Enterobacter, Citrobacter, and Serratia may develop resistance during prolonged therapy      -  Ciprofloxacin: R >2      -  Gentamicin: S <=4      -  Levofloxacin: R >4      -  Meropenem: S <=1      -  Nitrofurantoin: R >64 Should not be used to treat pyelonephritis      -  Piperacillin/Tazobactam: S <=16      -  Tobramycin: S <=4      -  Trimethoprim/Sulfamethoxazole: S <=2/38      Method Type: SELENA    RADIOLOGY & ADDITIONAL STUDIES:    EXAM:  XR CHEST PORTABLE URGENT 1V                            PROCEDURE DATE:  09/16/2019          INTERPRETATION:  XR CHEST URGENT    Single AP view    HISTORY:  increased weakness    Comparison: Chest x-ray 5/20/2019      The cardiac silhouette is within normal limits. The lungs are clear. No   pleural abnormality.    IMPRESSION: Normal AP chest.      Assessment :   78 yo male pmx of hemorrhagic CVA on recent admission to [facility] from 5/20-5/23 , h/o amyloid angiopathy, HTN, trigeminal neuralgia, depression, dementia, admitted with mental status change sec UTI with Proteus Also with progressive functional decline with global aphasia and dysphagia. High aspiration risk. Failed MBS. Family had refused PEG in the past. WBC resolved. Failed repeat swallow study. Had repeat MBS today and failed sec severe dysphagia. For PEG in am. Clinically stable.    Plan :   Completed course of Rocephin x 7 days  Trend temps and wbc  Asp precautions  PEG in am    D/w Hospitalist    Continue with present regiment.  Appropriate use of antibiotics and adverse effects reviewed.      I have discussed the above plan of care with patient/ family in detail. They expressed understanding of the  treatment plan . Risks, benefits and alternatives discussed in detail. I have asked if they have any questions or concerns and appropriately addressed them to the best of my ability .    25 minutes were spent in direct patient care reviewing notes, medications ,labs data/ imaging , discussion with multidisciplinary team.    Thank you for allowing me to participate in care of your patient .    Gerardo Mercedes MD  Infectious Disease  [phone]

## 2019-09-25 NOTE — CHART NOTE - NSCHARTNOTEFT_GEN_A_CORE
Assessment: 80 y/o male adm with sepsis. Pt with h/o dementia, non verbal. Pt's son at bedside. MBS completed on 9/24 which pt failed. To have PEG placed tomorrow.     Factors impacting intake: [ ] none [ ] nausea  [ ] vomiting [ ] diarrhea [ ] constipation  [x ]chewing problems [ x] swallowing issues  [ ] other:     Diet Presciption: Diet, NPO:   Except Medications (09-23-19 @ 16:15)  Diet, NPO:   NPO for Procedure/Test     NPO Start Date: 26-Sep-2019,   NPO Start Time: 00:01  Except Medications (09-24-19 @ 13:42)    Intake: pt NPO    Current Weight: 9/25 143.9# 9/16 145#  % Weight Change    Pertinent Medications: MEDICATIONS  (STANDING):  ascorbic acid 500 milliGRAM(s) Oral daily  carBAMazepine 100 milliGRAM(s) Oral two times a day  cholecalciferol 2000 Unit(s) Oral daily  dextrose 5% + lactated ringers. 1000 milliLiter(s) (75 mL/Hr) IV Continuous <Continuous>  enoxaparin Injectable 40 milliGRAM(s) SubCutaneous daily  hydrochlorothiazide 12.5 milliGRAM(s) Oral daily  influenza   Vaccine 0.5 milliLiter(s) IntraMuscular once  lactated ringers. 1000 milliLiter(s) (50 mL/Hr) IV Continuous <Continuous>  memantine 10 milliGRAM(s) Oral daily  nystatin Powder 1 Application(s) Topical two times a day  pregabalin 25 milliGRAM(s) Oral two times a day  traZODone 50 milliGRAM(s) Oral at bedtime    MEDICATIONS  (PRN):  acetaminophen   Tablet .. 650 milliGRAM(s) Oral every 6 hours PRN Temp greater or equal to 38C (100.4F)  magnesium hydroxide Suspension 30 milliLiter(s) Oral daily PRN Constipation    Pertinent Labs: 09-25 Na138 mmol/L Glu 77 mg/dL K+ 3.7 mmol/L Cr  0.73 mg/dL BUN 9 mg/dL 09-21 Phos 3.0 mg/dL     CAPILLARY BLOOD GLUCOSE        Skin: no skin breakdown noted.     Estimated Needs:   [x ] no change since previous assessment  [ ] recalculated:     Previous Nutrition Diagnosis:   [ ] Inadequate Energy Intake [ ]Inadequate Oral Intake [ ] Excessive Energy Intake   [ ] Underweight [ ] Increased Nutrient Needs [ ] Overweight/Obesity   [ ] Altered GI Function [ ] Unintended Weight Loss [ ] Food & Nutrition Related Knowledge Deficit [ x] Malnutrition     Nutrition Diagnosis is [x ] ongoing  [ ] resolved [ ] not applicable     New Nutrition Diagnosis: [ x] not applicable       Interventions:   Recommend  [ ] Change Diet To:  [ ] Nutrition Supplement  [ x] Nutrition Support: After PEG placed, consider Jevity 1.5 @ 75 cc/hr x 20hrs + 55cc/hr free fluid. Start TF 30cc/hr and increase 10cc every 8 hrs until goal rate is reached.   [ ] Other:     Monitoring and Evaluation:   [ ] PO intake [ x ] Tolerance to diet prescription [ x ] weights [ x ] labs[ x ] follow up per protocol  [ ] other:

## 2019-09-26 ENCOUNTER — TRANSCRIPTION ENCOUNTER (OUTPATIENT)
Age: 79
End: 2019-09-26

## 2019-09-26 LAB
ANION GAP SERPL CALC-SCNC: 8 MMOL/L — SIGNIFICANT CHANGE UP (ref 5–17)
BUN SERPL-MCNC: 13 MG/DL — SIGNIFICANT CHANGE UP (ref 7–23)
CALCIUM SERPL-MCNC: 8.5 MG/DL — SIGNIFICANT CHANGE UP (ref 8.5–10.1)
CHLORIDE SERPL-SCNC: 102 MMOL/L — SIGNIFICANT CHANGE UP (ref 96–108)
CO2 SERPL-SCNC: 28 MMOL/L — SIGNIFICANT CHANGE UP (ref 22–31)
CREAT SERPL-MCNC: 0.8 MG/DL — SIGNIFICANT CHANGE UP (ref 0.5–1.3)
GLUCOSE SERPL-MCNC: 82 MG/DL — SIGNIFICANT CHANGE UP (ref 70–99)
HCT VFR BLD CALC: 37.4 % — LOW (ref 39–50)
HGB BLD-MCNC: 12.7 G/DL — LOW (ref 13–17)
MCHC RBC-ENTMCNC: 29.9 PG — SIGNIFICANT CHANGE UP (ref 27–34)
MCHC RBC-ENTMCNC: 34 GM/DL — SIGNIFICANT CHANGE UP (ref 32–36)
MCV RBC AUTO: 88 FL — SIGNIFICANT CHANGE UP (ref 80–100)
NRBC # BLD: 0 /100 WBCS — SIGNIFICANT CHANGE UP (ref 0–0)
PLATELET # BLD AUTO: 249 K/UL — SIGNIFICANT CHANGE UP (ref 150–400)
POTASSIUM SERPL-MCNC: 3.9 MMOL/L — SIGNIFICANT CHANGE UP (ref 3.5–5.3)
POTASSIUM SERPL-SCNC: 3.9 MMOL/L — SIGNIFICANT CHANGE UP (ref 3.5–5.3)
RBC # BLD: 4.25 M/UL — SIGNIFICANT CHANGE UP (ref 4.2–5.8)
RBC # FLD: 12.3 % — SIGNIFICANT CHANGE UP (ref 10.3–14.5)
SODIUM SERPL-SCNC: 138 MMOL/L — SIGNIFICANT CHANGE UP (ref 135–145)
WBC # BLD: 6.39 K/UL — SIGNIFICANT CHANGE UP (ref 3.8–10.5)
WBC # FLD AUTO: 6.39 K/UL — SIGNIFICANT CHANGE UP (ref 3.8–10.5)

## 2019-09-26 PROCEDURE — 99232 SBSQ HOSP IP/OBS MODERATE 35: CPT | Mod: GC

## 2019-09-26 RX ORDER — CHOLECALCIFEROL (VITAMIN D3) 125 MCG
2000 CAPSULE ORAL
Qty: 0 | Refills: 0 | DISCHARGE
Start: 2019-09-26

## 2019-09-26 RX ORDER — MEMANTINE HYDROCHLORIDE 10 MG/1
1 TABLET ORAL
Qty: 0 | Refills: 0 | DISCHARGE
Start: 2019-09-26

## 2019-09-26 RX ORDER — NYSTATIN CREAM 100000 [USP'U]/G
1 CREAM TOPICAL
Qty: 0 | Refills: 0 | DISCHARGE
Start: 2019-09-26

## 2019-09-26 RX ORDER — MAGNESIUM HYDROXIDE 400 MG/1
30 TABLET, CHEWABLE ORAL
Qty: 0 | Refills: 0 | DISCHARGE
Start: 2019-09-26

## 2019-09-26 RX ORDER — ASCORBIC ACID 60 MG
1 TABLET,CHEWABLE ORAL
Qty: 0 | Refills: 0 | DISCHARGE
Start: 2019-09-26

## 2019-09-26 RX ORDER — ASCORBIC ACID 60 MG
1 TABLET,CHEWABLE ORAL
Qty: 0 | Refills: 0 | DISCHARGE

## 2019-09-26 RX ORDER — TRAZODONE HCL 50 MG
1 TABLET ORAL
Qty: 0 | Refills: 0 | DISCHARGE

## 2019-09-26 RX ORDER — TRAZODONE HCL 50 MG
1 TABLET ORAL
Qty: 0 | Refills: 0 | DISCHARGE
Start: 2019-09-26

## 2019-09-26 RX ORDER — CARBAMAZEPINE 200 MG
0.5 TABLET ORAL
Qty: 0 | Refills: 0 | DISCHARGE
Start: 2019-09-26

## 2019-09-26 RX ORDER — CHOLECALCIFEROL (VITAMIN D3) 125 MCG
1 CAPSULE ORAL
Qty: 0 | Refills: 0 | DISCHARGE

## 2019-09-26 RX ORDER — CEFAZOLIN SODIUM 1 G
1000 VIAL (EA) INJECTION ONCE
Refills: 0 | Status: COMPLETED | OUTPATIENT
Start: 2019-09-26 | End: 2019-09-26

## 2019-09-26 RX ORDER — MAGNESIUM HYDROXIDE 400 MG/1
0 TABLET, CHEWABLE ORAL
Qty: 0 | Refills: 0 | DISCHARGE

## 2019-09-26 RX ADMIN — Medication 2000 UNIT(S): at 13:32

## 2019-09-26 RX ADMIN — Medication 12.5 MILLIGRAM(S): at 05:22

## 2019-09-26 RX ADMIN — NYSTATIN CREAM 1 APPLICATION(S): 100000 CREAM TOPICAL at 05:22

## 2019-09-26 RX ADMIN — NYSTATIN CREAM 1 APPLICATION(S): 100000 CREAM TOPICAL at 17:45

## 2019-09-26 RX ADMIN — ENOXAPARIN SODIUM 40 MILLIGRAM(S): 100 INJECTION SUBCUTANEOUS at 13:32

## 2019-09-26 RX ADMIN — Medication 500 MILLIGRAM(S): at 13:32

## 2019-09-26 RX ADMIN — MEMANTINE HYDROCHLORIDE 10 MILLIGRAM(S): 10 TABLET ORAL at 13:32

## 2019-09-26 RX ADMIN — Medication 100 MILLIGRAM(S): at 13:32

## 2019-09-26 RX ADMIN — Medication 25 MILLIGRAM(S): at 05:22

## 2019-09-26 NOTE — PROGRESS NOTE ADULT - SUBJECTIVE AND OBJECTIVE BOX
Patient is a 79y old  Male who presents with a chief complaint of AMS/uti (26 Sep 2019 06:11)      FROM ADMISSION H+P:   HPI:  78 yo male pmx of hemorrhagic CVA, h/o amyloid angiopathy, dementia, HTN, candidal stomatitis, rosacea, blepharitis, chronic constipation, vitamin D deficiency, insomnia, neuralgia, depression, post herpetic neurologia who presents to the ED with a cc of increasing confusion.   Recently admitted to [facility] from 5/20-5/23 after pt was found to have new small amount of hemorrhage in the right posterior frontal parasagittal region.  No acute intervention was preformed.  He was noted to have a progressive functional decline with global aphasia and dysphagia.  PEG was recommend family refused.  Pt was discharged to NH.  Per wife history pt has been improving during his stay at the NH.  He had progressed to being able to ambulate with a walker and assistance and was able to tolerate soft mechanical soft diet and soon to be advanced to regular per son.  Approx 1 week ago pt had a flare of his trigeminal neuralgia and since then has had a progressive decline.  They reports that he has not been able to ambulate and has not been able to swallow for the last 3-4 days.  These symptoms were similar to his last re-bleed and so they became concerned and came to the ED for further work up. (16 Sep 2019 21:26)      ----  INTERVAL HPI/OVERNIGHT EVENTS: Pt seen and evaluated at the bedside. No acute overnight events occurred. He is awake and alert, responsive to stimuli. Patient to have PEG tube placed today. Family is agreeable to plan and MOLST is updated in the chart.     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 1st 5 yrs ago and 2 subsequent most recently on labor day  Amyloid disease  No significant past surgical history      FAMILY HISTORY:  No pertinent family history in first degree relatives      Allergies    No Known Allergies    Intolerances        ----  REVIEW OF SYSTEMS:  unable to obtain proper review of systems due to patient's deficits/mental status     ----  PHYSICAL EXAM:  GENERAL: elderly frail patient, NAD, appropriately interactive  LUNGS: good air entry bilaterally, clear to auscultation, symmetric breath sounds, no wheezing or rhonchi appreciated  HEART: soft S1/S2, regular rate and rhythm, no murmurs noted, no noted edema to b/l LE  GASTROINTESTINAL: abdomen is soft, nontender, nondistended, normoactive bowel sounds, no palpable masses  NEUROLOGIC: awake, alert, oriented x 1, good muscle tone in 4 extremities, motor strength 2/5 in b/l upper and lower extremities    T(C): 36.7 (09-26-19 @ 04:41), Max: 36.7 (09-25-19 @ 13:32)  HR: 79 (09-26-19 @ 04:41) (79 - 85)  BP: 121/76 (09-26-19 @ 04:41) (121/76 - 135/77)  RR: 17 (09-26-19 @ 04:41) (17 - 17)  SpO2: 98% (09-26-19 @ 04:41) (98% - 98%)  Wt(kg): --    ----  I&O's Summary    25 Sep 2019 07:01  -  26 Sep 2019 07:00  --------------------------------------------------------  IN: 1200 mL / OUT: 0 mL / NET: 1200 mL        LABS:                        12.7   6.39  )-----------( 249      ( 26 Sep 2019 07:20 )             37.4     09-26    138  |  102  |  13  ----------------------------<  82  3.9   |  28  |  0.80    Ca    8.5      26 Sep 2019 07:20          CAPILLARY BLOOD GLUCOSE

## 2019-09-26 NOTE — PROGRESS NOTE ADULT - PROBLEM SELECTOR PLAN 1
PEG this am  dementia  cva  dysphagia  assist with ADL  HOB elev  oral hygiene  skin care  pt is DNR  family educated - peg does not prevent aspiration and or prolong life -   GI following

## 2019-09-26 NOTE — PROGRESS NOTE ADULT - SUBJECTIVE AND OBJECTIVE BOX
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 1000 milliLiter(s) (75 mL/Hr) IV Continuous <Continuous>  enoxaparin Injectable 40 milliGRAM(s) SubCutaneous daily  hydrochlorothiazide 12.5 milliGRAM(s) Oral daily  influenza   Vaccine 0.5 milliLiter(s) IntraMuscular once  lactated ringers. 1000 milliLiter(s) (50 mL/Hr) IV Continuous <Continuous>  memantine 10 milliGRAM(s) Oral daily  nystatin Powder 1 Application(s) Topical two times a day  pregabalin 25 milliGRAM(s) Oral two times a day  traZODone 50 milliGRAM(s) Oral at bedtime    MEDICATIONS  (PRN):  acetaminophen   Tablet .. 650 milliGRAM(s) Oral every 6 hours PRN Temp greater or equal to 38C (100.4F)  magnesium hydroxide Suspension 30 milliLiter(s) Oral daily PRN Constipation         Vitals log        ICU Vital Signs Last 24 Hrs  T(C): 36.7 (26 Sep 2019 04:41), Max: 36.7 (25 Sep 2019 13:32)  T(F): 98.1 (26 Sep 2019 04:41), Max: 98.1 (26 Sep 2019 04:41)  HR: 79 (26 Sep 2019 04:41) (79 - 85)  BP: 121/76 (26 Sep 2019 04:41) (121/76 - 135/77)  BP(mean): --  ABP: --  ABP(mean): --  RR: 17 (26 Sep 2019 04:41) (17 - 17)  SpO2: 98% (26 Sep 2019 04:41) (98% - 98%)           Input and Output:  I&O's Detail    24 Sep 2019 07:01  -  25 Sep 2019 07:00  --------------------------------------------------------  IN:    lactated ringers.: 600 mL  Total IN: 600 mL    OUT:  Total OUT: 0 mL    Total NET: 600 mL      25 Sep 2019 07:01  -  26 Sep 2019 06:11  --------------------------------------------------------  IN:    lactated ringers.: 1200 mL  Total IN: 1200 mL    OUT:  Total OUT: 0 mL    Total NET: 1200 mL          Lab Data                        13.9   6.44  )-----------( 271      ( 25 Sep 2019 08:16 )             40.5     09-25    138  |  101  |  9   ----------------------------<  77  3.7   |  28  |  0.73    Ca    8.8      25 Sep 2019 08:16              Review of Systems	      Objective     Physical Examination    heart 1s2  lung dec BS  abd soft      Pertinent Lab findings & Imaging      Angel:  NO   Adequate UO     I&O's Detail    24 Sep 2019 07:01  -  25 Sep 2019 07:00  --------------------------------------------------------  IN:    lactated ringers.: 600 mL  Total IN: 600 mL    OUT:  Total OUT: 0 mL    Total NET: 600 mL      25 Sep 2019 07:01  -  26 Sep 2019 06:11  --------------------------------------------------------  IN:    lactated ringers.: 1200 mL  Total IN: 1200 mL    OUT:  Total OUT: 0 mL    Total NET: 1200 mL               Discussed with:     Cultures:	        Radiology

## 2019-09-26 NOTE — PROGRESS NOTE ADULT - SUBJECTIVE AND OBJECTIVE BOX
Neurology follow up note    HANSEN [id number]      Interval History:    Patient resting in bed     MEDICATIONS    acetaminophen   Tablet .. 650 milliGRAM(s) Oral every 6 hours PRN  ascorbic acid 500 milliGRAM(s) Oral daily  carBAMazepine 100 milliGRAM(s) Oral two times a day  cholecalciferol 2000 Unit(s) Oral daily  dextrose 5% + lactated ringers. 1000 milliLiter(s) IV Continuous <Continuous>  enoxaparin Injectable 40 milliGRAM(s) SubCutaneous daily  hydrochlorothiazide 12.5 milliGRAM(s) Oral daily  influenza   Vaccine 0.5 milliLiter(s) IntraMuscular once  lactated ringers. 1000 milliLiter(s) IV Continuous <Continuous>  magnesium hydroxide Suspension 30 milliLiter(s) Oral daily PRN  memantine 10 milliGRAM(s) Oral daily  nystatin Powder 1 Application(s) Topical two times a day  pregabalin 25 milliGRAM(s) Oral two times a day  traZODone 50 milliGRAM(s) Oral at bedtime      Allergies    No Known Allergies    Intolerances            Vital Signs Last 24 Hrs  T(C): 36.7 (26 Sep 2019 04:41), Max: 36.7 (25 Sep 2019 13:32)  T(F): 98.1 (26 Sep 2019 04:41), Max: 98.1 (26 Sep 2019 04:41)  HR: 79 (26 Sep 2019 04:41) (79 - 85)  BP: 121/76 (26 Sep 2019 04:41) (121/76 - 135/77)  BP(mean): --  RR: 17 (26 Sep 2019 04:41) (17 - 17)  SpO2: 98% (26 Sep 2019 04:41) (98% - 98%)          REVIEW OF SYSTEMS: Non Verbal    On Neurological Examination:    Mental Status - Patient is alert, awake    Extraocular movements appeared to be intact.      Pupils bilaterally 2 mm reactive to 1 mm.      Speech nonverbal.      Motor:  Bilateral upper; attempted to elevate arm, the patient would actively resist, pull both arms down, would say 4/5, bilateral lower extremities with plantar stimulation and flexation of hip and knee, would say 3+/5.    Sensory:  Unreliable.    Muscle tone - is normal all over.  No asymmetry is seen.      GENERAL Exam: Nontoxic , No Acute Distress   	  HEENT:  normocephalic, atraumatic  		  LUNGS:  Decreased bilaterally  	  HEART: Normal S1S2   No murmur RRR        	  GI/ ABDOMEN:  Soft  Non tender    EXTREMITIES:   No Edema  No Clubbing  No Cyanosis   	   SKIN: Normal  No Ecchymosis             LABS:  CBC Full  -  ( 26 Sep 2019 07:20 )  WBC Count : 6.39 K/uL  RBC Count : 4.25 M/uL  Hemoglobin : 12.7 g/dL  Hematocrit : 37.4 %  Platelet Count - Automated : 249 K/uL  Mean Cell Volume : 88.0 fl  Mean Cell Hemoglobin : 29.9 pg  Mean Cell Hemoglobin Concentration : 34.0 gm/dL  Auto Neutrophil # : x  Auto Lymphocyte # : x  Auto Monocyte # : x  Auto Eosinophil # : x  Auto Basophil # : x  Auto Neutrophil % : x  Auto Lymphocyte % : x  Auto Monocyte % : x  Auto Eosinophil % : x  Auto Basophil % : x      09-26    138  |  102  |  13  ----------------------------<  82  3.9   |  28  |  0.80    Ca    8.5      26 Sep 2019 07:20      Hemoglobin A1C:       Vitamin B12         RADIOLOGY    ANALYSIS AND PLAN:  An 88-year-old with episodes of change in mental status, history of intracerebral hemorrhage, dementia, and trigeminal neuralgia.  1.	For episode of change in mental status, possibly suspect multifactorial secondary to underlying infectious type process, UTI, and poor oral intake.  2.	Would recommend antibiotics as needed.  3.	For history of intracerebral hemorrhage, would recommend supportive therapy.  Repeat CAT scan does not show any new signs of intracerebral hemorrhage, limit the use of blood-thinning medications if possible.  4.	For history of dementia, continue the patient on Namenda 10 mg once a day.  5.	For history of trigeminal neuralgia, the patient is on Lyrica, I will add Tegretol to see if that will help.  6.	Aspiration precautions.  7.	Spoke with the son, Geovanni, at the bedside, telephone number [phone]. 9/26/19  8.	possible PEG placement if continue to have poor oral intake   9.	will decrease Lyrica to 25 bid     Greater than 40 minutes spent in direct patient care reviewing  the notes, lab data/ imaging , discussion with multidisciplinary team.

## 2019-09-26 NOTE — CONSULT NOTE ADULT - ASSESSMENT
80 yo with malnutrition  for PEG today
80 yo male pmx of hemorrhagic CVA, h/o amyloid angiopathy, dementia, HTN, candidal stomatitis, rosacea, blepharitis,chronic constipation, vitamin D deficiency, insomnia, neuralgia, depression, post herpetic neurologia, acute severe protein caloric malnutrition who presents to the ED with a cc of increasing confusion. Found with + UA and admitted with acute metabolic encephalopathy 2/2 urosepsis and now dysphagia sec to trigeminal neuralgia

## 2019-09-26 NOTE — PROGRESS NOTE ADULT - PROBLEM SELECTOR PLAN 2
Sepsis 2/2 UTI-resolved   Ceftriaxone course completed 9/22   follow fever curve closely, if new fever, get repeat cultures and CXR as patient is high risk for aspiration

## 2019-09-26 NOTE — PROGRESS NOTE ADULT - PROBLEM SELECTOR PLAN 1
acute on chronic, recurrent  -patient s/p multiple failed MBS and S/S; patient to remain NPO, started on LR IVF   -Family agreeable to PEG tube placement this AM- f/u with GI   -MOLST form updated to tube feeds   -cont trigeminal neuralgia meds- Lyrica changed to BID as patient's family is concerned about lethargy   -Continue Tegretol   -asp precautions

## 2019-09-26 NOTE — PROGRESS NOTE ADULT - SUBJECTIVE AND OBJECTIVE BOX
JADE VILLALOBOS is a 79yMale , patient examined and chart reviewed.    INTERVAL HPI/ OVERNIGHT EVENTS:   No events. afebrile.   Sp PEG.    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    ROS:  Unable to obtain due to : Pt' condition    Current inpatient medications :    MEDICATIONS  (STANDING):  ascorbic acid 500 milliGRAM(s) Oral daily  carBAMazepine 100 milliGRAM(s) Oral two times a day  cholecalciferol 2000 Unit(s) Oral daily  dextrose 5% + lactated ringers. 1000 milliLiter(s) (75 mL/Hr) IV Continuous <Continuous>  enoxaparin Injectable 40 milliGRAM(s) SubCutaneous daily  hydrochlorothiazide 12.5 milliGRAM(s) Oral daily  influenza   Vaccine 0.5 milliLiter(s) IntraMuscular once  lactated ringers. 1000 milliLiter(s) (50 mL/Hr) IV Continuous <Continuous>  memantine 10 milliGRAM(s) Oral daily  nystatin Powder 1 Application(s) Topical two times a day  pregabalin 25 milliGRAM(s) Oral two times a day  traZODone 50 milliGRAM(s) Oral at bedtime    MEDICATIONS  (PRN):  acetaminophen   Tablet .. 650 milliGRAM(s) Oral every 6 hours PRN Temp greater or equal to 38C (100.4F)  magnesium hydroxide Suspension 30 milliLiter(s) Oral daily PRN Constipation      Objective:  Vital Signs Last 24 Hrs  T(C): 36.6 (26 Sep 2019 14:15), Max: 36.8 (26 Sep 2019 12:55)  T(F): 97.9 (26 Sep 2019 14:15), Max: 98.2 (26 Sep 2019 12:55)  HR: 84 (26 Sep 2019 14:15) (74 - 84)  BP: 125/77 (26 Sep 2019 14:15) (121/76 - 132/78)  RR: 16 (26 Sep 2019 14:15) (14 - 17)  SpO2: 96% (26 Sep 2019 14:15) (96% - 98%)    Physical Exam:  General: no acute distress  Eyes: sclera anicteric, pupils equal and reactive to light  ENMT: buccal mucosa moist, pharynx not injected  Neck: supple, trachea midline  Lungs: Decreased, no wheeze/rhonchi  Cardiovascular: regular rate and rhythm, S1 S2  Abdomen: soft, nontender, +PEG bowel sounds normal  Neurological: awake confused  Skin: no increased ecchymosis/petechiae/purpura  Lymph Nodes: no palpable cervical/supraclavicular lymph nodes enlargements  Extremities: no cyanosis/clubbing/edema      LABS:                        12.7   6.39  )-----------( 249      ( 26 Sep 2019 07:20 )             37.4   09-26    138  |  102  |  13  ----------------------------<  82  3.9   |  28  |  0.80    Ca    8.5      26 Sep 2019 07:20        Assessment :   78 yo male pmx of hemorrhagic CVA on recent admission to [facility] from 5/20-5/23 , h/o amyloid angiopathy, HTN, trigeminal neuralgia, depression, dementia, admitted with mental status change sec UTI with Proteus Also with progressive functional decline with global aphasia and dysphagia. High aspiration risk. Failed MBS. Family had refused PEG in the past. WBC resolved. Failed repeat swallow study. Had repeat MBS today and failed sec severe dysphagia. Sp PEG today. Clinically stable.    Plan :   Completed course of Rocephin x 7 days  Trend temps and wbc  Asp precautions  Stable from ID standpoint    D/w Hospitalist    Continue with present regiment.  Appropriate use of antibiotics and adverse effects reviewed.      I have discussed the above plan of care with patient/ family in detail. They expressed understanding of the  treatment plan . Risks, benefits and alternatives discussed in detail. I have asked if they have any questions or concerns and appropriately addressed them to the best of my ability .    25 minutes were spent in direct patient care reviewing notes, medications ,labs data/ imaging , discussion with multidisciplinary team.    Thank you for allowing me to participate in care of your patient .    Gerardo Mercedes MD  Infectious Disease  [phone] JADE VILLALOBOS is a 79yMale , patient examined and chart reviewed.    INTERVAL HPI/ OVERNIGHT EVENTS:   No events. afebrile.   Sp PEG.    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    ROS:  Unable to obtain due to : Pt' condition    Current inpatient medications :    MEDICATIONS  (STANDING):  ascorbic acid 500 milliGRAM(s) Oral daily  carBAMazepine 100 milliGRAM(s) Oral two times a day  cholecalciferol 2000 Unit(s) Oral daily  dextrose 5% + lactated ringers. 1000 milliLiter(s) (75 mL/Hr) IV Continuous <Continuous>  enoxaparin Injectable 40 milliGRAM(s) SubCutaneous daily  hydrochlorothiazide 12.5 milliGRAM(s) Oral daily  influenza   Vaccine 0.5 milliLiter(s) IntraMuscular once  lactated ringers. 1000 milliLiter(s) (50 mL/Hr) IV Continuous <Continuous>  memantine 10 milliGRAM(s) Oral daily  nystatin Powder 1 Application(s) Topical two times a day  pregabalin 25 milliGRAM(s) Oral two times a day  traZODone 50 milliGRAM(s) Oral at bedtime    MEDICATIONS  (PRN):  acetaminophen   Tablet .. 650 milliGRAM(s) Oral every 6 hours PRN Temp greater or equal to 38C (100.4F)  magnesium hydroxide Suspension 30 milliLiter(s) Oral daily PRN Constipation      Objective:  Vital Signs Last 24 Hrs  T(C): 36.6 (26 Sep 2019 14:15), Max: 36.8 (26 Sep 2019 12:55)  T(F): 97.9 (26 Sep 2019 14:15), Max: 98.2 (26 Sep 2019 12:55)  HR: 84 (26 Sep 2019 14:15) (74 - 84)  BP: 125/77 (26 Sep 2019 14:15) (121/76 - 132/78)  RR: 16 (26 Sep 2019 14:15) (14 - 17)  SpO2: 96% (26 Sep 2019 14:15) (96% - 98%)    Physical Exam:  General: no acute distress  Eyes: sclera anicteric, pupils equal and reactive to light  ENMT: buccal mucosa moist, pharynx not injected  Neck: supple, trachea midline  Lungs: Decreased, no wheeze/rhonchi  Cardiovascular: regular rate and rhythm, S1 S2  Abdomen: soft, nontender, +PEG bowel sounds normal  Neurological: awake confused  Skin: no increased ecchymosis/petechiae/purpura  Lymph Nodes: no palpable cervical/supraclavicular lymph nodes enlargements  Extremities: no cyanosis/clubbing/edema      LABS:                        12.7   6.39  )-----------( 249      ( 26 Sep 2019 07:20 )             37.4   09-26    138  |  102  |  13  ----------------------------<  82  3.9   |  28  |  0.80    Ca    8.5      26 Sep 2019 07:20        Assessment :   78 yo male pmx of hemorrhagic CVA on recent admission to [facility] from 5/20-5/23 , h/o amyloid angiopathy, HTN, trigeminal neuralgia, depression, dementia, admitted with mental status change sec UTI with Proteus Also with progressive functional decline with global aphasia and dysphagia. High aspiration risk. Failed MBS sec severe dysphagia. Sp PEG today. Clinically stable.    Plan :   Completed course of Rocephin x 7 days  Trend temps and wbc  Asp precautions  Stable from ID standpoint    D/w Hospitalist    Continue with present regiment.  Appropriate use of antibiotics and adverse effects reviewed.      I have discussed the above plan of care with patient/ family in detail. They expressed understanding of the  treatment plan . Risks, benefits and alternatives discussed in detail. I have asked if they have any questions or concerns and appropriately addressed them to the best of my ability .    25 minutes were spent in direct patient care reviewing notes, medications ,labs data/ imaging , discussion with multidisciplinary team.    Thank you for allowing me to participate in care of your patient .    Gerardo Mercedes MD  Infectious Disease  [phone]

## 2019-09-26 NOTE — CONSULT NOTE ADULT - SUBJECTIVE AND OBJECTIVE BOX
hx obtained from chart:  80 yo male pmx of hemorrhagic CVA, h/o amyloid angiopathy, dementia, HTN, candidal stomatitis, rosacea, blepharitis, chronic constipation, vitamin D deficiency, insomnia, neuralgia, depression, post herpetic neurologia who presents to the ED with a cc of increasing confusion.   Recently admitted to [facility] from 5/20-5/23 after pt was found to have new small amount of hemorrhage in the right posterior frontal parasagittal region.  No acute intervention was preformed.  He was noted to have a progressive functional decline with global aphasia and dysphagia.  PEG was recommend family refused.  Pt was discharged to NH.  Per wife history pt has been improving during his stay at the NH.  He had progressed to being able to ambulate with a walker and assistance and was able to tolerate soft mechanical soft diet and soon to be advanced to regular per son.  Approx 1 week ago pt had a flare of his trigeminal neuralgia and since then has had a progressive decline.  They reports that he has not been able to ambulate and has not been able to swallow for the last 3-4 days.  These symptoms were similar to his last re-bleed and so they became concerned and came to the ED for further work up.    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 1000 milliLiter(s) (75 mL/Hr) IV Continuous <Continuous>  enoxaparin Injectable 40 milliGRAM(s) SubCutaneous daily  hydrochlorothiazide 12.5 milliGRAM(s) Oral daily  influenza   Vaccine 0.5 milliLiter(s) IntraMuscular once  lactated ringers. 1000 milliLiter(s) (50 mL/Hr) IV Continuous <Continuous>  memantine 10 milliGRAM(s) Oral daily  nystatin Powder 1 Application(s) Topical two times a day  pregabalin 25 milliGRAM(s) Oral two times a day  traZODone 50 milliGRAM(s) Oral at bedtime    MEDICATIONS  (PRN):  acetaminophen   Tablet .. 650 milliGRAM(s) Oral every 6 hours PRN Temp greater or equal to 38C (100.4F)  magnesium hydroxide Suspension 30 milliLiter(s) Oral daily PRN Constipation      Allergies    No Known Allergies    Intolerances    SH-NC    .  VITAL SIGNS:  T(C): 36.7 (09-26-19 @ 04:41), Max: 36.7 (09-25-19 @ 13:32)  T(F): 98.1 (09-26-19 @ 04:41), Max: 98.1 (09-26-19 @ 04:41)  HR: 79 (09-26-19 @ 04:41) (79 - 85)  BP: 121/76 (09-26-19 @ 04:41) (121/76 - 135/77)  BP(mean): --  RR: 17 (09-26-19 @ 04:41) (17 - 17)  SpO2: 98% (09-26-19 @ 04:41) (98% - 98%)  Wt(kg): --    PHYSICAL EXAM:    Constitutional:  NAD, resting comfortably in bed  Head: NC/AT  Eyes: PERRL b/l  ENT: MMM  Neck: supple; no JVD or thyromegaly  Respiratory: CTA B/L   Cardiac: +S1/S2; RRR   Gastrointestinal: soft, NT/ND; no rebound or guarding; + BS  Genitourinary: normal external genitalia  Back: no CVA B/L  Extremities: WWP, no clubbing or cyanosis; no peripheral edema                          12.7   6.39  )-----------( 249      ( 26 Sep 2019 07:20 )             37.4   09-26    138  |  102  |  13  ----------------------------<  82  3.9   |  28  |  0.80    Ca    8.5      26 Sep 2019 07:20

## 2019-09-26 NOTE — PROGRESS NOTE ADULT - NSHPATTENDINGPLANDISCUSS_GEN_ALL_CORE
at length with son/hcp/caretaker and wife at bedside, Beryl NAIR
family member at bedside re: procedure tomorrow, anticipated hospital course, discharge back to LTC facility
patient family member at bedside
wife at bedside, son on wife's phone
wife/hcp/caretaker, son at bedside
patients son, wife, nurse
son/hcp/caretaker, wife  at bedside, Debra speech therapist, 3w IDR team
son at bedside, speech pathologist, JOANIE Corrigan
3W IDR team, Dr Warren neuro

## 2019-09-26 NOTE — DISCHARGE NOTE PROVIDER - HOSPITAL COURSE
FROM ADMISSION H+P:     HPI:    80 yo male pmx of hemorrhagic CVA, h/o amyloid angiopathy, dementia, HTN, candidal stomatitis, rosacea, blepharitis, chronic constipation, vitamin D deficiency, insomnia, neuralgia, depression, post herpetic neurologia who presents to the ED with a cc of increasing confusion.   Recently admitted to [facility] from 5/20-5/23 after pt was found to have new small amount of hemorrhage in the right posterior frontal parasagittal region.  No acute intervention was preformed.  He was noted to have a progressive functional decline with global aphasia and dysphagia.  PEG was recommend family refused.  Pt was discharged to NH.  Per wife history pt has been improving during his stay at the NH.  He had progressed to being able to ambulate with a walker and assistance and was able to tolerate soft mechanical soft diet and soon to be advanced to regular per son.  Approx 1 week ago pt had a flare of his trigeminal neuralgia and since then has had a progressive decline.  They reports that he has not been able to ambulate and has not been able to swallow for the last 3-4 days.  These symptoms were similar to his last re-bleed and so they became concerned and came to the ED for further work up. (16 Sep 2019 21:26)        ---    HOSPITAL COURSE:     Patient was admitted to [facility] for urosepsis and was found to have GNR E. coli in blood cultures which was likely cause of his AMS.  Patient was treated with IV rocephin and completed 7 day abx course in the hospital.          Patient has history of multiple ICH and CVAs causing progressive worsening aphasia and dysphagia but noted to have decline recently.  Patient orignally had MOLST form with no feeding tube and family refused PEG placement in the past.  Multiple speech and swallow evaluations and modified barium swallows were performed which patient failed all, thus requiring family to reconsider necessity of PEG tube and were now agreeable.  Palliative was consulted (Dr. Jarrett) and hospice team to readress MOLST form, and new form was signed with agreement to PEG placement.  Patient had PEG tube plcaed on 9/26 with Dr. Kapadia and PEG tube feeds were started the following day.  Patient tolerated tube feeds well ****.        ---    CONSULTANTS:         ---    TIME SPENT:    The total amount of time spent reviewing the hospital notes, laboratory values, imaging findings, assessing/counseling the patient, discussing with consultant physicians, social work, nursing staff took -- minutes        ---    FINAL DISCHARGE DIAGNOSIS LIST:    Please see last daily progress note for final discharge diagnoses        ---    Primary care provider was made aware of plan for discharge:      [  ] NO     [  ] YES FROM ADMISSION H+P:     HPI:    80 yo male pmx of hemorrhagic CVA, h/o amyloid angiopathy, dementia, HTN, candidal stomatitis, rosacea, blepharitis, chronic constipation, vitamin D deficiency, insomnia, neuralgia, depression, post herpetic neurologia who presents to the ED with a cc of increasing confusion.   Recently admitted to [facility] from 5/20-5/23 after pt was found to have new small amount of hemorrhage in the right posterior frontal parasagittal region.  No acute intervention was preformed.  He was noted to have a progressive functional decline with global aphasia and dysphagia.  PEG was recommend family refused.  Pt was discharged to NH.  Per wife history pt has been improving during his stay at the NH.  He had progressed to being able to ambulate with a walker and assistance and was able to tolerate soft mechanical soft diet and soon to be advanced to regular per son.  Approx 1 week ago pt had a flare of his trigeminal neuralgia and since then has had a progressive decline.  They reports that he has not been able to ambulate and has not been able to swallow for the last 3-4 days.  These symptoms were similar to his last re-bleed and so they became concerned and came to the ED for further work up. (16 Sep 2019 21:26)        ---    HOSPITAL COURSE:     Patient was admitted to [facility] for urosepsis and was found to have GNR E. coli in blood cultures which was likely cause of his AMS.  ID (Dr. Mercedes) was consulted and patient was treated with IV rocephin and completed 7 day abx course in the hospital.  Repeat blood cultures were negative.  Neuro (Dr. Choudhury) was consulted for AMS and determined to be likley metabolic encephalopthy 2/2 urosepsis.  Repeat CT head showed no new signs of ICH.         Patient has history of multiple ICH and CVAs causing progressive worsening aphasia and dysphagia but noted to have decline recently, likely secondary to trigemnial neuralgia exacerbation.  Patient was on Lyrica at home and was started on Tegretol by neuro to help with symptoms.  GI (Dr. Kapadia) was consulted for dysphagia.  Patient orignally had MOLST form with no feeding tube and family refused PEG placement in the past.  Multiple speech and swallow evaluations and modified barium swallows were performed which patient failed all, thus requiring family to reconsider necessity of PEG tube and were now agreeable.  Not agreeable to NGT placement.  Palliative was consulted (Dr. Jarrett) and hospice team to readress MOLST form, and new form was signed with agreement to PEG placement.  Patient had PEG tube plcaed on 9/26 with Dr. Kapadia and PEG tube feeds were started the following day.  Patient tolerated tube feeds well ****.          ---    CONSULTANTS:     ID- Dr. Mercedes    Palliative- Dr. Jarrett        ---    TIME SPENT:    The total amount of time spent reviewing the hospital notes, laboratory values, imaging findings, assessing/counseling the patient, discussing with consultant physicians, social work, nursing staff took -- minutes        ---    FINAL DISCHARGE DIAGNOSIS LIST:    Please see last daily progress note for final discharge diagnoses        ---    Primary care provider was made aware of plan for discharge:      [  ] NO     [  ] YES FROM ADMISSION H+P:     HPI:    78 yo male pmx of hemorrhagic CVA, h/o amyloid angiopathy, dementia, HTN, candidal stomatitis, rosacea, blepharitis, chronic constipation, vitamin D deficiency, insomnia, neuralgia, depression, post herpetic neurologia who presents to the ED with a cc of increasing confusion.   Recently admitted to [facility] from 5/20-5/23 after pt was found to have new small amount of hemorrhage in the right posterior frontal parasagittal region.  No acute intervention was preformed.  He was noted to have a progressive functional decline with global aphasia and dysphagia.  PEG was recommend family refused.  Pt was discharged to NH.  Per wife history pt has been improving during his stay at the NH.  He had progressed to being able to ambulate with a walker and assistance and was able to tolerate soft mechanical soft diet and soon to be advanced to regular per son.  Approx 1 week ago pt had a flare of his trigeminal neuralgia and since then has had a progressive decline.  They reports that he has not been able to ambulate and has not been able to swallow for the last 3-4 days.  These symptoms were similar to his last re-bleed and so they became concerned and came to the ED for further work up. (16 Sep 2019 21:26)        ---    HOSPITAL COURSE:     Patient was admitted to [facility] for urosepsis and was found to have GNR E. coli in blood cultures which was likely cause of his AMS.  ID (Dr. Mercedes) was consulted and patient was treated with IV rocephin and completed 7 day abx course in the hospital.  Repeat blood cultures were negative.  Neuro (Dr. Choudhury) was consulted for AMS and determined to be likley metabolic encephalopthy 2/2 urosepsis.  Repeat CT head showed no new signs of ICH.         Patient has history of multiple ICH and CVAs causing progressive worsening aphasia and dysphagia but noted to have decline recently, likely secondary to trigemnial neuralgia exacerbation.  Patient was on Lyrica at home and was started on Tegretol by neuro to help with symptoms.  GI (Dr. Kapadia) was consulted for dysphagia.  Patient orignally had MOLST form with no feeding tube and family refused PEG placement in the past.  Multiple speech and swallow evaluations and modified barium swallows were performed which patient failed all, thus requiring family to reconsider necessity of PEG tube and were now agreeable.  Not agreeable to NGT placement.  Palliative was consulted (Dr. Jarrett) and hospice team to readress MOLST form, and new form was signed with agreement to PEG placement.  Surgery was consulted and PEG tube placed on 9/26 by Dr. Stoner and PEG tube feeds were started the following day.  Patient tolerated tube feeds well ****.  Patient seen and examined on day of discharge, medically optimized to go back to assisted living facility with close outpatient follow up with PCP.         ---    CONSULTANTS:     ID- Dr. Mercedes    Palliative- Dr. Jarrett    GI- Dr. Kelly    Surgery- Dr. Stoner    Neuro- Dr. Choudhury FROM ADMISSION H+P:     HPI:    80 yo male pmx of hemorrhagic CVA, h/o amyloid angiopathy, dementia, HTN, candidal stomatitis, rosacea, blepharitis, chronic constipation, vitamin D deficiency, insomnia, neuralgia, depression, post herpetic neurologia who presents to the ED with a cc of increasing confusion.   Recently admitted to [facility] from 5/20-5/23 after pt was found to have new small amount of hemorrhage in the right posterior frontal parasagittal region.  No acute intervention was preformed.  He was noted to have a progressive functional decline with global aphasia and dysphagia.  PEG was recommend family refused.  Pt was discharged to NH.  Per wife history pt has been improving during his stay at the NH.  He had progressed to being able to ambulate with a walker and assistance and was able to tolerate soft mechanical soft diet and soon to be advanced to regular per son.  Approx 1 week ago pt had a flare of his trigeminal neuralgia and since then has had a progressive decline.  They reports that he has not been able to ambulate and has not been able to swallow for the last 3-4 days.  These symptoms were similar to his last re-bleed and so they became concerned and came to the ED for further work up. (16 Sep 2019 21:26)        ---    HOSPITAL COURSE:     Patient was admitted to [facility] for urosepsis and was found to have GNR E. coli in blood cultures which was likely cause of his AMS.  ID (Dr. Mercedes) was consulted and patient was treated with IV rocephin and completed 7 day abx course in the hospital.  Repeat blood cultures were negative.  Neuro (Dr. Choudhury) was consulted for AMS and determined to be likley metabolic encephalopthy 2/2 urosepsis.  Repeat CT head showed no new signs of ICH.         Patient has history of multiple ICH and CVAs causing progressive worsening aphasia and dysphagia but noted to have decline recently, likely secondary to trigemnial neuralgia exacerbation.  Patient was on Lyrica at home and was started on Tegretol by neuro to help with symptoms.  GI (Dr. Kapadia) was consulted for dysphagia.  Patient orignally had MOLST form with no feeding tube and family refused PEG placement in the past.  Multiple speech and swallow evaluations and modified barium swallows were performed which patient failed all, thus requiring family to reconsider necessity of PEG tube and were now agreeable.  Not agreeable to NGT placement.  Palliative was consulted (Dr. Jarrett) and hospice team to readress MOLST form, and new form was signed with agreement to PEG placement.  Surgery was consulted and PEG tube placed on 9/26 by Dr. Stoner and PEG tube feeds were started the following day.  Patient tolerated tube feeds well.  Patient seen and examined on day of discharge, medically optimized to go back to assisted living facility with close outpatient follow up with PCP.         ---    CONSULTANTS:     ID- Dr. Mercedes    Palliative- Dr. Jarrett    GI- Dr. Kelly    Surgery- Dr. Stoner    Neuro- Dr. Choudhury        Vital Signs Last 24 Hrs    T(C): 36.8 (27 Sep 2019 05:01), Max: 37.1 (26 Sep 2019 21:08)    T(F): 98.3 (27 Sep 2019 05:01), Max: 98.8 (26 Sep 2019 21:08)    HR: 88 (27 Sep 2019 05:01) (74 - 90)    BP: 110/70 (27 Sep 2019 05:01) (110/70 - 132/78)    RR: 17 (27 Sep 2019 05:01) (14 - 17)    SpO2: 97% (27 Sep 2019 05:01) (96% - 97%)        PHYSICAL EXAM:    GENERAL: elderly frail patient, NAD, appropriately interactive    LUNGS: good air entry bilaterally, clear to auscultation, symmetric breath sounds, no wheezing or rhonchi appreciated    HEART: soft S1/S2, regular rate and rhythm, no murmurs noted, no noted edema to b/l LE    GASTROINTESTINAL: abdomen is soft, nontender, nondistended, normoactive bowel sounds, no palpable masses, PEG tube in place, site c/d/i    NEUROLOGIC: awake, alert, oriented x 1, good muscle tone in 4 extremities, motor strength 2/5 in b/l upper and lower extremities FROM ADMISSION H+P:     HPI:    78 yo male pmx of hemorrhagic CVA, h/o amyloid angiopathy, dementia, HTN, candidal stomatitis, rosacea, blepharitis, chronic constipation, vitamin D deficiency, insomnia, neuralgia, depression, post herpetic neurologia who presents to the ED with a cc of increasing confusion.   Recently admitted to [place] from 5/20-5/23 after pt was found to have new small amount of hemorrhage in the right posterior frontal parasagittal region.  No acute intervention was preformed.  He was noted to have a progressive functional decline with global aphasia and dysphagia.  PEG was recommend family refused.  Pt was discharged to NH.  Per wife history pt has been improving during his stay at the NH.  He had progressed to being able to ambulate with a walker and assistance and was able to tolerate soft mechanical soft diet and soon to be advanced to regular per son.  Approx 1 week ago pt had a flare of his trigeminal neuralgia and since then has had a progressive decline.  They reports that he has not been able to ambulate and has not been able to swallow for the last 3-4 days.  These symptoms were similar to his last re-bleed and so they became concerned and came to the ED for further work up. (16 Sep 2019 21:26)        ---    HOSPITAL COURSE:     Patient was admitted to [facility] for urosepsis and was found to have GNR E. coli in blood cultures which was likely cause of his AMS.  ID (Dr. Mercedes) was consulted and patient was treated with IV rocephin and completed 7 day abx course in the hospital.  Repeat blood cultures were negative.  Neuro (Dr. Choudhury) was consulted for AMS and determined to be likley metabolic encephalopthy 2/2 urosepsis.  Repeat CT head showed no new signs of ICH.         Patient has history of multiple ICH and CVAs causing progressive worsening aphasia and dysphagia but noted to have decline recently, likely secondary to trigemnial neuralgia exacerbation.  Patient was on Lyrica at home and was started on Tegretol by neuro to help with symptoms.  GI (Dr. Kapadia) was consulted for dysphagia.  Patient orignally had MOLST form with no feeding tube and family refused PEG placement in the past.  Multiple speech and swallow evaluations and modified barium swallows were performed which patient failed all, thus requiring family to reconsider necessity of PEG tube and were now agreeable.  Not agreeable to NGT placement.  Palliative was consulted (Dr. Jarrett) and hospice team to readress MOLST form, and new form was signed with agreement to PEG placement.  Surgery was consulted and PEG tube placed on 9/26 by Dr. Stoner and PEG tube feeds were started the following day.  Patient tolerated tube feeds well.  Patient seen and examined on day of discharge, medically optimized to go back to his LTC facility with close outpatient follow up with PCP. Lyrica was discontinued due to lethargy.         ---    CONSULTANTS:     ID- Dr. Mercedes    Palliative- Dr. Jarrett    GI- Dr. Kelly    Surgery- Dr. Stoner    Neuro- Dr. Choudhury        Vital Signs Last 24 Hrs    T(C): 36.6 (28 Sep 2019 04:53), Max: 37 (27 Sep 2019 13:00)    T(F): 97.9 (28 Sep 2019 04:53), Max: 98.6 (27 Sep 2019 13:00)    HR: 79 (28 Sep 2019 04:53) (79 - 88)    BP: 103/66 (28 Sep 2019 04:53) (103/62 - 107/72)    BP(mean): --    RR: 17 (28 Sep 2019 04:53) (17 - 17)    SpO2: 94% (28 Sep 2019 04:53) (94% - 98%)        PHYSICAL EXAM:    GENERAL: elderly frail patient, NAD, appropriately interactive    LUNGS: good air entry bilaterally, clear to auscultation, symmetric breath sounds, no wheezing or rhonchi appreciated    HEART: soft S1/S2, regular rate and rhythm, no murmurs noted, no noted edema to b/l LE    GASTROINTESTINAL: abdomen is soft, nontender, nondistended, normoactive bowel sounds, no palpable masses, PEG tube in place, site c/d/i    NEUROLOGIC: awake, alert, oriented x 1, good muscle tone in 4 extremities, motor strength 2/5 in b/l upper and lower extremities        Time spent: 32 minutes

## 2019-09-26 NOTE — PROGRESS NOTE ADULT - PROBLEM SELECTOR PLAN 4
-Pt severely malnourished, Temporal, clavicular, and shoulder muscle wasting present, BMI 19.7  -dysphagia is likely 2/2 past CVAs and progressive deconditioning due to chronic disease state  -PEG tube placement this AM   -GI consulted

## 2019-09-26 NOTE — PROGRESS NOTE ADULT - SUBJECTIVE AND OBJECTIVE BOX
s/p  upper gastrointestinal endoscopy and peg    minimal amount of incisional bleeding which resolved with tamponade  dressing placed  binder placed    rec:   do not use peg today  will eval in am  d/w family

## 2019-09-26 NOTE — DISCHARGE NOTE PROVIDER - CARE PROVIDER_API CALL
Baljinder Grace (DO)  Family Medicine  [address] [place] [address]  Phone: [phone]  Fax: [phone]  Follow Up Time:     Zhao Kapadia (DO)  Gastroenterology; Internal Medicine  [address] [place] [address]  Phone: [phone]  Fax: [phone]  Follow Up Time:

## 2019-09-26 NOTE — DISCHARGE NOTE PROVIDER - NSDCCPCAREPLAN_GEN_ALL_CORE_FT
PRINCIPAL DISCHARGE DIAGNOSIS  Diagnosis: Sepsis  Assessment and Plan of Treatment: You were orignally admitted for sepsis likely due to urinary tract infection and were treated with 7 days of IV antibiotics.  Your infection resolved and repeat cultures were negative.      SECONDARY DISCHARGE DIAGNOSES  Diagnosis: Dysphagia  Assessment and Plan of Treatment: You were noted to have difficulty swallowing and a PEG tube was placed on 9/26.  Tube feeds were started and you tolerated it well.  Although tube feeding does not prevent aspiration, you can now receive nutrition.  Please continue tube feeds and elevate bed with feeds are on to prevent aspiration.    Diagnosis: UTI (urinary tract infection)  Assessment and Plan of Treatment: You were noted to have a urinary tract infection and were treated for 7 days with IV antibiotics.    Diagnosis: Altered mental status  Assessment and Plan of Treatment: Your altered mental status was likely due to your infection.  It improved as the infection was treated.    Diagnosis: Neuralgia  Assessment and Plan of Treatment: Your home dose of lyrica was decreased as it made you lethargic and your family was concerned.  You were also started on Tegretol.  Please continue Lyrica 25mg twice daily and tegretol 100mg twice daily.    Diagnosis: HTN (hypertension)  Assessment and Plan of Treatment: Continue hydrochlorothiazide. PRINCIPAL DISCHARGE DIAGNOSIS  Diagnosis: Sepsis  Assessment and Plan of Treatment: You were orignally admitted for sepsis likely due to urinary tract infection and were treated with 7 days of IV antibiotics.  Your infection resolved and repeat cultures were negative.      SECONDARY DISCHARGE DIAGNOSES  Diagnosis: Dysphagia  Assessment and Plan of Treatment: You were noted to have difficulty swallowing and a PEG tube was placed on 9/26.  Tube feeds were started and you tolerated it well.  Although tube feeding does not prevent aspiration, you can now receive nutrition.    -Please continue tube feeds and elevate bed with feeds are on to prevent aspiration.  -Tube feeds with Jevity 1.5 at 75 cc/hr for 20 hours a day plus 55cc/hr free fluid. Start tube feeds at 30cc/hr and increase 10cc every 8 hrs until goal rate is reached.    Diagnosis: UTI (urinary tract infection)  Assessment and Plan of Treatment: You were noted to have a urinary tract infection and were treated for 7 days with IV antibiotics.    Diagnosis: Altered mental status  Assessment and Plan of Treatment: Your altered mental status was likely due to your infection.  It improved as the infection was treated.    Diagnosis: Neuralgia  Assessment and Plan of Treatment: Your home dose of lyrica was decreased as it made you lethargic and your family was concerned.  You were also started on Tegretol.  Please continue Lyrica 25mg twice daily and tegretol 100mg twice daily.    Diagnosis: HTN (hypertension)  Assessment and Plan of Treatment: Continue hydrochlorothiazide. PRINCIPAL DISCHARGE DIAGNOSIS  Diagnosis: Sepsis  Assessment and Plan of Treatment: You were orignally admitted for sepsis likely due to urinary tract infection and were treated with 7 days of IV antibiotics.  Your infection resolved and repeat cultures were negative.      SECONDARY DISCHARGE DIAGNOSES  Diagnosis: HTN (hypertension)  Assessment and Plan of Treatment: Continue hydrochlorothiazide.    Diagnosis: Neuralgia  Assessment and Plan of Treatment: Your home dose of lyrica was decreased as it made you lethargic and your family was concerned.  You were also started on Tegretol, and Lyrica was stopped due to lethargy.    Diagnosis: Dysphagia  Assessment and Plan of Treatment: You were noted to have difficulty swallowing and a PEG tube was placed on 9/26.  Tube feeds were started and you tolerated it well.  Although tube feeding does not prevent aspiration, you can now receive nutrition.    -Please continue tube feeds and elevate bed with feeds are on to prevent aspiration.  -Tube feeds with Jevity 1.5 at 75 cc/hr for 20 hours a day plus 55cc/hr free fluid. Start tube feeds at 30cc/hr and increase 10cc every 8 hrs until goal rate is reached.    Diagnosis: Altered mental status  Assessment and Plan of Treatment: Your altered mental status was likely due to your infection.  It improved as the infection was treated.    Diagnosis: UTI (urinary tract infection)  Assessment and Plan of Treatment: You were noted to have a urinary tract infection and were treated for 7 days with IV antibiotics.

## 2019-09-27 PROCEDURE — 99232 SBSQ HOSP IP/OBS MODERATE 35: CPT | Mod: GC

## 2019-09-27 RX ADMIN — MEMANTINE HYDROCHLORIDE 10 MILLIGRAM(S): 10 TABLET ORAL at 11:15

## 2019-09-27 RX ADMIN — Medication 50 MILLIGRAM(S): at 21:16

## 2019-09-27 RX ADMIN — NYSTATIN CREAM 1 APPLICATION(S): 100000 CREAM TOPICAL at 17:25

## 2019-09-27 RX ADMIN — ENOXAPARIN SODIUM 40 MILLIGRAM(S): 100 INJECTION SUBCUTANEOUS at 11:16

## 2019-09-27 RX ADMIN — Medication 100 MILLIGRAM(S): at 11:15

## 2019-09-27 RX ADMIN — Medication 100 MILLIGRAM(S): at 23:16

## 2019-09-27 RX ADMIN — NYSTATIN CREAM 1 APPLICATION(S): 100000 CREAM TOPICAL at 06:21

## 2019-09-27 RX ADMIN — SODIUM CHLORIDE 50 MILLILITER(S): 9 INJECTION, SOLUTION INTRAVENOUS at 06:25

## 2019-09-27 RX ADMIN — Medication 2000 UNIT(S): at 11:15

## 2019-09-27 RX ADMIN — Medication 500 MILLIGRAM(S): at 11:16

## 2019-09-27 NOTE — PROGRESS NOTE ADULT - SUBJECTIVE AND OBJECTIVE BOX
Neurology follow up note    HANSEN [id number]      Interval History:    Patient resting in bed     MEDICATIONS    acetaminophen   Tablet .. 650 milliGRAM(s) Oral every 6 hours PRN  ascorbic acid 500 milliGRAM(s) Oral daily  carBAMazepine 100 milliGRAM(s) Oral two times a day  cholecalciferol 2000 Unit(s) Oral daily  dextrose 5% + lactated ringers. 1000 milliLiter(s) IV Continuous <Continuous>  enoxaparin Injectable 40 milliGRAM(s) SubCutaneous daily  hydrochlorothiazide 12.5 milliGRAM(s) Oral daily  influenza   Vaccine 0.5 milliLiter(s) IntraMuscular once  lactated ringers. 1000 milliLiter(s) IV Continuous <Continuous>  magnesium hydroxide Suspension 30 milliLiter(s) Oral daily PRN  memantine 10 milliGRAM(s) Oral daily  nystatin Powder 1 Application(s) Topical two times a day  pregabalin 25 milliGRAM(s) Oral two times a day  traZODone 50 milliGRAM(s) Oral at bedtime      Allergies    No Known Allergies    Intolerances            Vital Signs Last 24 Hrs  T(C): 36.8 (27 Sep 2019 05:01), Max: 37.1 (26 Sep 2019 21:08)  T(F): 98.3 (27 Sep 2019 05:01), Max: 98.8 (26 Sep 2019 21:08)  HR: 88 (27 Sep 2019 05:01) (74 - 90)  BP: 110/70 (27 Sep 2019 05:01) (110/70 - 132/78)  BP(mean): --  RR: 17 (27 Sep 2019 05:01) (14 - 17)  SpO2: 97% (27 Sep 2019 05:01) (96% - 97%)    REVIEW OF SYSTEMS: Non Verbal    On Neurological Examination:    Mental Status - Patient is alert, awake    Extraocular movements appeared to be intact.      Pupils bilaterally 2 mm reactive to 1 mm.      Speech nonverbal.      Motor:  Bilateral upper; attempted to elevate arm, the patient would actively resist, pull both arms down, would say 4/5, bilateral lower extremities with plantar stimulation and flexation of hip and knee, would say 3+/5.    Sensory:  Unreliable.    Muscle tone - is normal all over.  No asymmetry is seen.      GENERAL Exam: Nontoxic , No Acute Distress   	  HEENT:  normocephalic, atraumatic  		  LUNGS:  Decreased bilaterally  	  HEART: Normal S1S2   No murmur RRR        	  GI/ ABDOMEN:  Soft  Non tender    EXTREMITIES:   No Edema  No Clubbing  No Cyanosis   	   SKIN: Normal  No Ecchymosis                  LABS:  CBC Full  -  ( 26 Sep 2019 07:20 )  WBC Count : 6.39 K/uL  RBC Count : 4.25 M/uL  Hemoglobin : 12.7 g/dL  Hematocrit : 37.4 %  Platelet Count - Automated : 249 K/uL  Mean Cell Volume : 88.0 fl  Mean Cell Hemoglobin : 29.9 pg  Mean Cell Hemoglobin Concentration : 34.0 gm/dL  Auto Neutrophil # : x  Auto Lymphocyte # : x  Auto Monocyte # : x  Auto Eosinophil # : x  Auto Basophil # : x  Auto Neutrophil % : x  Auto Lymphocyte % : x  Auto Monocyte % : x  Auto Eosinophil % : x  Auto Basophil % : x      09-26    138  |  102  |  13  ----------------------------<  82  3.9   |  28  |  0.80    Ca    8.5      26 Sep 2019 07:20      Hemoglobin A1C:       Vitamin B12         RADIOLOGY      ANALYSIS AND PLAN:  An 88-year-old with episodes of change in mental status, history of intracerebral hemorrhage, dementia, and trigeminal neuralgia.  1.	For episode of change in mental status, possibly suspect multifactorial secondary to underlying infectious type process, UTI, and poor oral intake.  2.	Would recommend antibiotics as needed.  3.	For history of intracerebral hemorrhage, would recommend supportive therapy.  Repeat CAT scan does not show any new signs of intracerebral hemorrhage, limit the use of blood-thinning medications if possible.  4.	For history of dementia, continue the patient on Namenda 10 mg once a day.  5.	For history of trigeminal neuralgia, the patient is on Lyrica, I will add Tegretol to see if that will help.  6.	Aspiration precautions.  7.	Spoke with the son, Geovanni, at the bedside, telephone number [phone]. 9/26/19 no response today   8.	possible PEG placement if continue to have poor oral intake   9.	will dc Lyrica    Greater than 40 minutes spent in direct patient care reviewing  the notes, lab data/ imaging , discussion with multidisciplinary team.

## 2019-09-27 NOTE — PROGRESS NOTE ADULT - PROBLEM SELECTOR PLAN 7
acute on Chronic, trigeminal   Continue Lyrica 50 mg BID   Continue Tegretol acute on Chronic, trigeminal   Lyrica stopped by neuro due to concern for lethargy.  Continue Tegretol

## 2019-09-27 NOTE — PROGRESS NOTE ADULT - SUBJECTIVE AND OBJECTIVE BOX
GRISELDAJADE is a 79yMale , patient examined and chart reviewed.    INTERVAL HPI/ OVERNIGHT EVENTS:   No events. Afebrile.     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    ROS:  Unable to obtain due to : Pt' condition    Current inpatient medications :    MEDICATIONS  (STANDING):  ascorbic acid 500 milliGRAM(s) Oral daily  carBAMazepine 100 milliGRAM(s) Oral two times a day  cholecalciferol 2000 Unit(s) Oral daily  dextrose 5% + lactated ringers. 1000 milliLiter(s) (75 mL/Hr) IV Continuous <Continuous>  enoxaparin Injectable 40 milliGRAM(s) SubCutaneous daily  hydrochlorothiazide 12.5 milliGRAM(s) Oral daily  influenza   Vaccine 0.5 milliLiter(s) IntraMuscular once  lactated ringers. 1000 milliLiter(s) (50 mL/Hr) IV Continuous <Continuous>  memantine 10 milliGRAM(s) Oral daily  nystatin Powder 1 Application(s) Topical two times a day  pregabalin 25 milliGRAM(s) Oral two times a day  traZODone 50 milliGRAM(s) Oral at bedtime    MEDICATIONS  (PRN):  acetaminophen   Tablet .. 650 milliGRAM(s) Oral every 6 hours PRN Temp greater or equal to 38C (100.4F)  magnesium hydroxide Suspension 30 milliLiter(s) Oral daily PRN Constipation      Objective:  Vital Signs Last 24 Hrs  T(C): 36.8 (27 Sep 2019 05:01), Max: 37.1 (26 Sep 2019 21:08)  T(F): 98.3 (27 Sep 2019 05:01), Max: 98.8 (26 Sep 2019 21:08)  HR: 88 (27 Sep 2019 05:01) (74 - 90)  BP: 110/70 (27 Sep 2019 05:01) (110/70 - 132/78)  RR: 17 (27 Sep 2019 05:01) (14 - 17)  SpO2: 97% (27 Sep 2019 05:01) (96% - 97%)    Physical Exam:  General: no acute distress  Eyes: sclera anicteric, pupils equal and reactive to light  ENMT: buccal mucosa moist, pharynx not injected  Neck: supple, trachea midline  Lungs: Decreased, no wheeze/rhonchi  Cardiovascular: regular rate and rhythm, S1 S2  Abdomen: soft, nontender, +PEG bowel sounds normal  Neurological: awake confused  Skin: no increased ecchymosis/petechiae/purpura  Lymph Nodes: no palpable cervical/supraclavicular lymph nodes enlargements  Extremities: no cyanosis/clubbing/edema      LABS:                         12.7   6.39  )-----------( 249      ( 26 Sep 2019 07:20 )             37.4   09-26    138  |  102  |  13  ----------------------------<  82  3.9   |  28  |  0.80    Ca    8.5      26 Sep 2019 07:20    Assessment :   80 yo male pmx of hemorrhagic CVA on recent admission to [facility] from 5/20-5/23 , h/o amyloid angiopathy, HTN, trigeminal neuralgia, depression, dementia, admitted with mental status change sec UTI with Proteus Also with progressive functional decline with global aphasia and dysphagia. High aspiration risk. Failed MBS sec severe dysphagia. Sp PEG 9/26/19. Clinically stable.    Plan :   Completed course of Rocephin x 7 days  Trend temps and wbc  Asp precautions  Stable from ID standpoint    D/w Hospitalist    Continue with present regiment.  Appropriate use of antibiotics and adverse effects reviewed.      I have discussed the above plan of care with patient/ family in detail. They expressed understanding of the  treatment plan . Risks, benefits and alternatives discussed in detail. I have asked if they have any questions or concerns and appropriately addressed them to the best of my ability .    25 minutes were spent in direct patient care reviewing notes, medications ,labs data/ imaging , discussion with multidisciplinary team.    Thank you for allowing me to participate in care of your patient .    Gerardo Mercedes MD  Infectious Disease  [phone]

## 2019-09-27 NOTE — PROGRESS NOTE ADULT - SUBJECTIVE AND OBJECTIVE BOX
pt seen  no issues  ICU Vital Signs Last 24 Hrs  T(C): 36.8 (27 Sep 2019 05:01), Max: 37.1 (26 Sep 2019 21:08)  T(F): 98.3 (27 Sep 2019 05:01), Max: 98.8 (26 Sep 2019 21:08)  HR: 88 (27 Sep 2019 05:01) (74 - 90)  BP: 110/70 (27 Sep 2019 05:01) (110/70 - 132/78)  BP(mean): --  ABP: --  ABP(mean): --  RR: 17 (27 Sep 2019 05:01) (14 - 17)  SpO2: 97% (27 Sep 2019 05:01) (96% - 97%)  gen-NAD  resp-clear  abd-soft NT/ND  incision c/d/i                        12.7   6.39  )-----------( 249      ( 26 Sep 2019 07:20 )             37.4

## 2019-09-27 NOTE — PROGRESS NOTE ADULT - PROBLEM SELECTOR PLAN 1
9/26 sp peg placement; w minimal amount of incisional bleeding which resolved w tamponade  f/u am labs  if hgb stable, ok to use peg for meds  routine PEG care  correct elytes prn  aspiration/gerd prec, elevate hob  maintain abd binder  will follow 9/26 sp peg placement; w minimal amount of incisional bleeding which resolved w tamponade  f/u am labs  ok to use peg for meds/flushes  may use peg to start tf as per nutrition recs  routine PEG care  correct elytes prn  aspiration/gerd prec, elevate hob  maintain abd binder  will follow

## 2019-09-27 NOTE — PROGRESS NOTE ADULT - PROBLEM SELECTOR PLAN 4
-Pt severely malnourished, Temporal, clavicular, and shoulder muscle wasting present, BMI 19.7  -dysphagia is likely 2/2 past CVAs and progressive deconditioning due to chronic disease state  -PEG tube placed yesterday AM; patient tolerating feeds well

## 2019-09-27 NOTE — PROGRESS NOTE ADULT - SUBJECTIVE AND OBJECTIVE BOX
Patient is a 79y old  Male who presents with a chief complaint of AMS/uti (27 Sep 2019 12:43)      FROM ADMISSION H+P:   HPI:  78 yo male pmx of hemorrhagic CVA, h/o amyloid angiopathy, dementia, HTN, candidal stomatitis, rosacea, blepharitis, chronic constipation, vitamin D deficiency, insomnia, neuralgia, depression, post herpetic neurologia who presents to the ED with a cc of increasing confusion.   Recently admitted to [facility] from 5/20-5/23 after pt was found to have new small amount of hemorrhage in the right posterior frontal parasagittal region.  No acute intervention was preformed.  He was noted to have a progressive functional decline with global aphasia and dysphagia.  PEG was recommend family refused.  Pt was discharged to NH.  Per wife history pt has been improving during his stay at the NH.  He had progressed to being able to ambulate with a walker and assistance and was able to tolerate soft mechanical soft diet and soon to be advanced to regular per son.  Approx 1 week ago pt had a flare of his trigeminal neuralgia and since then has had a progressive decline.  They reports that he has not been able to ambulate and has not been able to swallow for the last 3-4 days.  These symptoms were similar to his last re-bleed and so they became concerned and came to the ED for further work up. (16 Sep 2019 21:26)      ----  INTERVAL HPI/OVERNIGHT EVENTS: Pt seen and evaluated at the bedside. No acute overnight events occurred. He is awake, alert, responsive to stimuli.  PEG placed yesterday; as per GI and surgery recommendations, tube feeds could be started. Patient tolerating feeds well so far. Possible discharge to nursing home tomorrow.     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 1st 5 yrs ago and 2 subsequent most recently on labor day  Amyloid disease  No significant past surgical history      FAMILY HISTORY:  No pertinent family history in first degree relatives      Allergies    No Known Allergies    Intolerances        ----  REVIEW OF SYSTEMS:  unable to obtain proper review of systems due to patient's deficits/mental status   ----  PHYSICAL EXAM:  GENERAL: elderly frail patient, NAD, appropriately interactive  LUNGS: good air entry bilaterally, clear to auscultation, symmetric breath sounds, no wheezing or rhonchi appreciated  HEART: soft S1/S2, regular rate and rhythm, no murmurs noted, no noted edema to b/l LE  GASTROINTESTINAL: abdomen is soft, nontender, nondistended, normoactive bowel sounds, no palpable masses, PEG tube in place   NEUROLOGIC: awake, alert, oriented x 1, good muscle tone in 4 extremities, motor strength 2/5 in b/l upper and lower extremities    T(C): 36.8 (09-27-19 @ 05:01), Max: 37.1 (09-26-19 @ 21:08)  HR: 88 (09-27-19 @ 05:01) (84 - 90)  BP: 110/70 (09-27-19 @ 05:01) (110/70 - 125/77)  RR: 17 (09-27-19 @ 05:01) (16 - 17)  SpO2: 97% (09-27-19 @ 05:01) (96% - 97%)  Wt(kg): --    ----  I&O's Summary    26 Sep 2019 07:01  -  27 Sep 2019 07:00  --------------------------------------------------------  IN: 650 mL / OUT: 0 mL / NET: 650 mL    27 Sep 2019 07:01  -  27 Sep 2019 13:49  --------------------------------------------------------  IN: 260 mL / OUT: 0 mL / NET: 260 mL        LABS:                        12.7   6.39  )-----------( 249      ( 26 Sep 2019 07:20 )             37.4     09-26    138  |  102  |  13  ----------------------------<  82  3.9   |  28  |  0.80    Ca    8.5      26 Sep 2019 07:20          CAPILLARY BLOOD GLUCOSE

## 2019-09-27 NOTE — PROGRESS NOTE ADULT - SUBJECTIVE AND OBJECTIVE BOX
INTERVAL HPI/OVERNIGHT EVENTS:  pt seen and examined  resting in bed  sp peg yesterday  per nursing no active bleeding from peg site  am labs pending    MEDICATIONS  (STANDING):  ascorbic acid 500 milliGRAM(s) Oral daily  carBAMazepine 100 milliGRAM(s) Oral two times a day  cholecalciferol 2000 Unit(s) Oral daily  dextrose 5% + lactated ringers. 1000 milliLiter(s) (75 mL/Hr) IV Continuous <Continuous>  enoxaparin Injectable 40 milliGRAM(s) SubCutaneous daily  hydrochlorothiazide 12.5 milliGRAM(s) Oral daily  influenza   Vaccine 0.5 milliLiter(s) IntraMuscular once  lactated ringers. 1000 milliLiter(s) (50 mL/Hr) IV Continuous <Continuous>  memantine 10 milliGRAM(s) Oral daily  nystatin Powder 1 Application(s) Topical two times a day  pregabalin 25 milliGRAM(s) Oral two times a day  traZODone 50 milliGRAM(s) Oral at bedtime    MEDICATIONS  (PRN):  acetaminophen   Tablet .. 650 milliGRAM(s) Oral every 6 hours PRN Temp greater or equal to 38C (100.4F)  magnesium hydroxide Suspension 30 milliLiter(s) Oral daily PRN Constipation      Allergies    No Known Allergies    Intolerances        Review of Systems:    unable to obtain    Vital Signs Last 24 Hrs  T(C): 36.8 (27 Sep 2019 05:01), Max: 37.1 (26 Sep 2019 21:08)  T(F): 98.3 (27 Sep 2019 05:01), Max: 98.8 (26 Sep 2019 21:08)  HR: 88 (27 Sep 2019 05:01) (74 - 90)  BP: 110/70 (27 Sep 2019 05:01) (110/70 - 132/78)  BP(mean): --  RR: 17 (27 Sep 2019 05:01) (14 - 17)  SpO2: 97% (27 Sep 2019 05:01) (96% - 97%)    PHYSICAL EXAM:    General:  nad  HEENT:  NC/AT  Chest:   dec bs  Cardiovascular:  Regular rhythm, S1, S2  Abdomen:  Soft, non-tender, non-distended, +peg c/d/i no active bleeding appreciated from peg site, dressing c/d/i as well +abd binder  Extremities:  no  edema  Skin:  No rash  Neuro/Psych:  Awake alert  LABS:                        12.7   6.39  )-----------( 249      ( 26 Sep 2019 07:20 )             37.4     09-26    138  |  102  |  13  ----------------------------<  82  3.9   |  28  |  0.80    Ca    8.5      26 Sep 2019 07:20            RADIOLOGY & ADDITIONAL TESTS:

## 2019-09-27 NOTE — PROGRESS NOTE ADULT - SUBJECTIVE AND OBJECTIVE BOX
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 1000 milliLiter(s) (75 mL/Hr) IV Continuous <Continuous>  enoxaparin Injectable 40 milliGRAM(s) SubCutaneous daily  hydrochlorothiazide 12.5 milliGRAM(s) Oral daily  influenza   Vaccine 0.5 milliLiter(s) IntraMuscular once  lactated ringers. 1000 milliLiter(s) (50 mL/Hr) IV Continuous <Continuous>  memantine 10 milliGRAM(s) Oral daily  nystatin Powder 1 Application(s) Topical two times a day  pregabalin 25 milliGRAM(s) Oral two times a day  traZODone 50 milliGRAM(s) Oral at bedtime    MEDICATIONS  (PRN):  acetaminophen   Tablet .. 650 milliGRAM(s) Oral every 6 hours PRN Temp greater or equal to 38C (100.4F)  magnesium hydroxide Suspension 30 milliLiter(s) Oral daily PRN Constipation         Vitals log        ICU Vital Signs Last 24 Hrs  T(C): 36.8 (27 Sep 2019 05:01), Max: 37.1 (26 Sep 2019 21:08)  T(F): 98.3 (27 Sep 2019 05:01), Max: 98.8 (26 Sep 2019 21:08)  HR: 88 (27 Sep 2019 05:01) (74 - 90)  BP: 110/70 (27 Sep 2019 05:01) (110/70 - 132/78)  BP(mean): --  ABP: --  ABP(mean): --  RR: 17 (27 Sep 2019 05:01) (14 - 17)  SpO2: 97% (27 Sep 2019 05:01) (96% - 97%)           Input and Output:  I&O's Detail    25 Sep 2019 07:01  -  26 Sep 2019 07:00  --------------------------------------------------------  IN:    lactated ringers.: 1200 mL  Total IN: 1200 mL    OUT:  Total OUT: 0 mL    Total NET: 1200 mL      26 Sep 2019 07:01  -  27 Sep 2019 06:16  --------------------------------------------------------  IN:    lactated ringers.: 600 mL    Solution: 50 mL  Total IN: 650 mL    OUT:  Total OUT: 0 mL    Total NET: 650 mL          Lab Data                        12.7   6.39  )-----------( 249      ( 26 Sep 2019 07:20 )             37.4     09-26    138  |  102  |  13  ----------------------------<  82  3.9   |  28  |  0.80    Ca    8.5      26 Sep 2019 07:20              Review of Systems	      Objective     Physical Examination  heart s1s2  lung dec BS  abd soft        Pertinent Lab findings & Imaging      Angel:  NO   Adequate UO     I&O's Detail    25 Sep 2019 07:01  -  26 Sep 2019 07:00  --------------------------------------------------------  IN:    lactated ringers.: 1200 mL  Total IN: 1200 mL    OUT:  Total OUT: 0 mL    Total NET: 1200 mL      26 Sep 2019 07:01  -  27 Sep 2019 06:16  --------------------------------------------------------  IN:    lactated ringers.: 600 mL    Solution: 50 mL  Total IN: 650 mL    OUT:  Total OUT: 0 mL    Total NET: 650 mL               Discussed with:     Cultures:	        Radiology

## 2019-09-27 NOTE — PROGRESS NOTE ADULT - SUBJECTIVE AND OBJECTIVE BOX
79y Male     T(C): 36.8 (09-27-19 @ 05:01), Max: 37.1 (09-26-19 @ 21:08)  HR: 88 (09-27-19 @ 05:01) (74 - 90)  BP: 110/70 (09-27-19 @ 05:01) (110/70 - 132/78)  RR: 17 (09-27-19 @ 05:01) (14 - 17)  SpO2: 97% (09-27-19 @ 05:01) (96% - 97%)  Wt(kg): --    Pt seen, doing well, no anesthesia complications or complaints noted or reported.   No Nausea  Pain well controlled

## 2019-09-27 NOTE — PROGRESS NOTE ADULT - PROBLEM SELECTOR PLAN 1
cva  dementia  s/p PEG  GI and Surgery notes reviewed  HOB elev  asp prec  oral hygiene  PEG care  start TF when ok with GI  on IVF  supportive medical regimen  pt is DNR  GOC discussed with son and family

## 2019-09-27 NOTE — PROGRESS NOTE ADULT - PROBLEM SELECTOR PLAN 1
acute on chronic, recurrent  -patient s/p multiple failed MBS and S/S; PEG tube placed 9/26 without complications, tube feeds started, patient tolerating tube feeds well, IVF discontinued   -MOLST form updated to tube feeds   -cont trigeminal neuralgia meds- Lyrica changed to BID as patient's family is concerned about lethargy   -Continue Tegretol   -asp precautions acute on chronic, recurrent  -patient s/p multiple failed MBS and S/S; PEG tube placed 9/26 without complications, tube feeds started, patient tolerating tube feeds well, IVF discontinued   -MOLST form updated to tube feeds   -Lyrica stopped by neuro.  -Continue Tegretol   -asp precautions

## 2019-09-28 ENCOUNTER — TRANSCRIPTION ENCOUNTER (OUTPATIENT)
Age: 79
End: 2019-09-28

## 2019-09-28 VITALS
OXYGEN SATURATION: 94 % | TEMPERATURE: 98 F | SYSTOLIC BLOOD PRESSURE: 100 MMHG | RESPIRATION RATE: 17 BRPM | DIASTOLIC BLOOD PRESSURE: 65 MMHG | HEART RATE: 86 BPM

## 2019-09-28 DIAGNOSIS — Z71.89 OTHER SPECIFIED COUNSELING: ICD-10-CM

## 2019-09-28 LAB
ANION GAP SERPL CALC-SCNC: 8 MMOL/L — SIGNIFICANT CHANGE UP (ref 5–17)
BUN SERPL-MCNC: 9 MG/DL — SIGNIFICANT CHANGE UP (ref 7–23)
CALCIUM SERPL-MCNC: 8.9 MG/DL — SIGNIFICANT CHANGE UP (ref 8.5–10.1)
CHLORIDE SERPL-SCNC: 100 MMOL/L — SIGNIFICANT CHANGE UP (ref 96–108)
CO2 SERPL-SCNC: 30 MMOL/L — SIGNIFICANT CHANGE UP (ref 22–31)
CREAT SERPL-MCNC: 0.84 MG/DL — SIGNIFICANT CHANGE UP (ref 0.5–1.3)
GLUCOSE SERPL-MCNC: 120 MG/DL — HIGH (ref 70–99)
HCT VFR BLD CALC: 38.7 % — LOW (ref 39–50)
HGB BLD-MCNC: 13.3 G/DL — SIGNIFICANT CHANGE UP (ref 13–17)
MCHC RBC-ENTMCNC: 30.3 PG — SIGNIFICANT CHANGE UP (ref 27–34)
MCHC RBC-ENTMCNC: 34.4 GM/DL — SIGNIFICANT CHANGE UP (ref 32–36)
MCV RBC AUTO: 88.2 FL — SIGNIFICANT CHANGE UP (ref 80–100)
NRBC # BLD: 0 /100 WBCS — SIGNIFICANT CHANGE UP (ref 0–0)
PLATELET # BLD AUTO: 268 K/UL — SIGNIFICANT CHANGE UP (ref 150–400)
POTASSIUM SERPL-MCNC: 3.6 MMOL/L — SIGNIFICANT CHANGE UP (ref 3.5–5.3)
POTASSIUM SERPL-SCNC: 3.6 MMOL/L — SIGNIFICANT CHANGE UP (ref 3.5–5.3)
RBC # BLD: 4.39 M/UL — SIGNIFICANT CHANGE UP (ref 4.2–5.8)
RBC # FLD: 12.6 % — SIGNIFICANT CHANGE UP (ref 10.3–14.5)
SODIUM SERPL-SCNC: 138 MMOL/L — SIGNIFICANT CHANGE UP (ref 135–145)
WBC # BLD: 8.5 K/UL — SIGNIFICANT CHANGE UP (ref 3.8–10.5)
WBC # FLD AUTO: 8.5 K/UL — SIGNIFICANT CHANGE UP (ref 3.8–10.5)

## 2019-09-28 PROCEDURE — 99239 HOSP IP/OBS DSCHRG MGMT >30: CPT

## 2019-09-28 RX ORDER — DOCUSATE SODIUM 100 MG
100 CAPSULE ORAL THREE TIMES A DAY
Refills: 0 | Status: DISCONTINUED | OUTPATIENT
Start: 2019-09-28 | End: 2019-09-28

## 2019-09-28 RX ORDER — DOCUSATE SODIUM 100 MG
1 CAPSULE ORAL
Qty: 0 | Refills: 0 | DISCHARGE
Start: 2019-09-28

## 2019-09-28 RX ORDER — SENNA PLUS 8.6 MG/1
2 TABLET ORAL AT BEDTIME
Refills: 0 | Status: DISCONTINUED | OUTPATIENT
Start: 2019-09-28 | End: 2019-09-28

## 2019-09-28 RX ORDER — SENNA PLUS 8.6 MG/1
2 TABLET ORAL
Qty: 0 | Refills: 0 | DISCHARGE
Start: 2019-09-28

## 2019-09-28 RX ORDER — POLYETHYLENE GLYCOL 3350 17 G/17G
17 POWDER, FOR SOLUTION ORAL ONCE
Refills: 0 | Status: COMPLETED | OUTPATIENT
Start: 2019-09-28 | End: 2019-09-28

## 2019-09-28 RX ADMIN — Medication 100 MILLIGRAM(S): at 12:26

## 2019-09-28 RX ADMIN — POLYETHYLENE GLYCOL 3350 17 GRAM(S): 17 POWDER, FOR SOLUTION ORAL at 05:55

## 2019-09-28 RX ADMIN — NYSTATIN CREAM 1 APPLICATION(S): 100000 CREAM TOPICAL at 05:37

## 2019-09-28 RX ADMIN — Medication 500 MILLIGRAM(S): at 12:27

## 2019-09-28 RX ADMIN — MEMANTINE HYDROCHLORIDE 10 MILLIGRAM(S): 10 TABLET ORAL at 12:27

## 2019-09-28 RX ADMIN — INFLUENZA VIRUS VACCINE 0.5 MILLILITER(S): 15; 15; 15; 15 SUSPENSION INTRAMUSCULAR at 13:25

## 2019-09-28 RX ADMIN — Medication 2000 UNIT(S): at 12:30

## 2019-09-28 RX ADMIN — Medication 100 MILLIGRAM(S): at 05:37

## 2019-09-28 RX ADMIN — ENOXAPARIN SODIUM 40 MILLIGRAM(S): 100 INJECTION SUBCUTANEOUS at 12:27

## 2019-09-28 NOTE — PROGRESS NOTE ADULT - SUBJECTIVE AND OBJECTIVE BOX
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 1st 5 yrs ago and 2 subsequent most recently on labor day  Amyloid disease  No significant past surgical history        Medication list         MEDICATIONS  (STANDING):  ascorbic acid 500 milliGRAM(s) Oral daily  carBAMazepine 100 milliGRAM(s) Oral two times a day  cholecalciferol 2000 Unit(s) Oral daily  docusate sodium 100 milliGRAM(s) Oral three times a day  enoxaparin Injectable 40 milliGRAM(s) SubCutaneous daily  hydrochlorothiazide 12.5 milliGRAM(s) Oral daily  influenza   Vaccine 0.5 milliLiter(s) IntraMuscular once  memantine 10 milliGRAM(s) Oral daily  nystatin Powder 1 Application(s) Topical two times a day  senna 2 Tablet(s) Oral at bedtime  traZODone 50 milliGRAM(s) Oral at bedtime    MEDICATIONS  (PRN):  acetaminophen   Tablet .. 650 milliGRAM(s) Oral every 6 hours PRN Temp greater or equal to 38C (100.4F)  magnesium hydroxide Suspension 30 milliLiter(s) Oral daily PRN Constipation         Vitals log        ICU Vital Signs Last 24 Hrs  T(C): 36.6 (28 Sep 2019 04:53), Max: 37 (27 Sep 2019 13:00)  T(F): 97.9 (28 Sep 2019 04:53), Max: 98.6 (27 Sep 2019 13:00)  HR: 79 (28 Sep 2019 04:53) (79 - 88)  BP: 103/66 (28 Sep 2019 04:53) (103/62 - 107/72)  BP(mean): --  ABP: --  ABP(mean): --  RR: 17 (28 Sep 2019 04:53) (17 - 17)  SpO2: 94% (28 Sep 2019 04:53) (94% - 98%)           Input and Output:  I&O's Detail    27 Sep 2019 07:01  -  28 Sep 2019 07:00  --------------------------------------------------------  IN:    Enteral Tube Flush: 765 mL    lactated ringers.: 700 mL    ns in tub fed  [id number]: 400 mL  Total IN: 1865 mL    OUT:  Total OUT: 0 mL    Total NET: 1865 mL          Lab Data                        12.7   6.39  )-----------( 249      ( 26 Sep 2019 07:20 )             37.4     09-26    138  |  102  |  13  ----------------------------<  82  3.9   |  28  |  0.80    Ca    8.5      26 Sep 2019 07:20              Review of Systems	      Objective     Physical Examination    heart s1s2  lung dec BS  abd soft      Pertinent Lab findings & Imaging      Angel:  NO   Adequate UO     I&O's Detail    27 Sep 2019 07:01  -  28 Sep 2019 07:00  --------------------------------------------------------  IN:    Enteral Tube Flush: 765 mL    lactated ringers.: 700 mL    ns in tub fed  [id number]: 400 mL  Total IN: 1865 mL    OUT:  Total OUT: 0 mL    Total NET: 1865 mL               Discussed with:     Cultures:	        Radiology

## 2019-09-28 NOTE — PROGRESS NOTE ADULT - PROBLEM SELECTOR PLAN 1
Follow up for memory loss. Patient reports no significant changes in memory since last visit. Did not have neuropsych testing done due to change in insurance. 9/26 sp peg placement; w minimal amount of incisional bleeding which resolved w tamponade  f/u am labs  ok to use PEG  routine PEG care  correct elytes prn  aspiration/gerd prec, elevate hob  maintain abd binder  will follow

## 2019-09-28 NOTE — PROGRESS NOTE ADULT - SUBJECTIVE AND OBJECTIVE BOX
INTERVAL HPI/OVERNIGHT EVENTS:  - No acute events overnight.     MEDICATIONS  (STANDING):  ascorbic acid 500 milliGRAM(s) Oral daily  carBAMazepine 100 milliGRAM(s) Oral two times a day  cholecalciferol 2000 Unit(s) Oral daily  docusate sodium 100 milliGRAM(s) Oral three times a day  enoxaparin Injectable 40 milliGRAM(s) SubCutaneous daily  hydrochlorothiazide 12.5 milliGRAM(s) Oral daily  influenza   Vaccine 0.5 milliLiter(s) IntraMuscular once  memantine 10 milliGRAM(s) Oral daily  nystatin Powder 1 Application(s) Topical two times a day  senna 2 Tablet(s) Oral at bedtime  traZODone 50 milliGRAM(s) Oral at bedtime    MEDICATIONS  (PRN):  acetaminophen   Tablet .. 650 milliGRAM(s) Oral every 6 hours PRN Temp greater or equal to 38C (100.4F)  magnesium hydroxide Suspension 30 milliLiter(s) Oral daily PRN Constipation      Allergies    No Known Allergies    Intolerances        Review of Systems:    Unable to obtain due to baseline cognitive dysfunction.      Vital Signs Last 24 Hrs  T(C): 36.6 (28 Sep 2019 04:53), Max: 37 (27 Sep 2019 13:00)  T(F): 97.9 (28 Sep 2019 04:53), Max: 98.6 (27 Sep 2019 13:00)  HR: 79 (28 Sep 2019 04:53) (79 - 88)  BP: 103/66 (28 Sep 2019 04:53) (103/62 - 107/72)  BP(mean): --  RR: 17 (28 Sep 2019 04:53) (17 - 17)  SpO2: 94% (28 Sep 2019 04:53) (94% - 98%)    PHYSICAL EXAM:    Constitutional: NAD  HEENT: EOMI, throat clear  Neck: No LAD, supple  Respiratory: CTA and P  Cardiovascular: S1 and S2, RRR, no M  Gastrointestinal: BS+, soft, NT/ND, neg HSM,  Extremities: No peripheral edema, neg clubbing, cyanosis  Vascular: 2+ peripheral pulses  Neurological: A/O x 1  Psychiatric: calm  Skin: No rashes      LABS:                        13.3   8.50  )-----------( 268      ( 28 Sep 2019 07:22 )             38.7     09-28    138  |  100  |  9   ----------------------------<  120<H>  3.6   |  30  |  0.84    Ca    8.9      28 Sep 2019 07:22            RADIOLOGY & ADDITIONAL TESTS:

## 2019-09-28 NOTE — DISCHARGE NOTE NURSING/CASE MANAGEMENT/SOCIAL WORK - PATIENT PORTAL LINK FT
You can access the FollowMyHealth Patient Portal offered by [facility] by registering at the following website: [email or web address]. By joining [facility]’s FollowMyHealth portal, you will also be able to view your health information using other applications (apps) compatible with our system.

## 2019-09-28 NOTE — PROVIDER CONTACT NOTE (OTHER) - ACTION/TREATMENT ORDERED:
made aware.  Said she will put orders in.  Will continue to monitor
Dr Conklin made aware of family feeding pt, will continue to monitor.
aware, otdoryngology consult was placed

## 2019-09-28 NOTE — PROGRESS NOTE ADULT - PROVIDER SPECIALTY LIST ADULT
Anesthesia
Gastroenterology
Hospitalist
Infectious Disease
Neurology
Palliative Care
Pulmonology
Surgery
Gastroenterology
Infectious Disease
Neurology
Hospitalist
Hospitalist
Gastroenterology
Hospitalist

## 2019-09-28 NOTE — PROGRESS NOTE ADULT - PROBLEM SELECTOR PLAN 1
on TF  via PEG  oral hygiene  skin care  HOB elev  asp prec  assist with ADL  monitor vs and HD and Sat  dc planned for [facility]  will follow  pt is DNR DNI  prognosis poor - aspiration may occur with PEG as well - discussed with family  cva and dysphagia hx

## 2019-09-28 NOTE — PROVIDER CONTACT NOTE (OTHER) - REASON
PT NPO and family feeding pt.
pt pocketing in mouth meds crushed in small amt of applesauce, went for barrium swallow, wife feeding pt watermelon
no BM since 9/18/19

## 2019-09-28 NOTE — PROVIDER CONTACT NOTE (OTHER) - SITUATION
No BM has been recorded since 9/18/19.  Pt does not have any bowel prep regimen that is currently ordered.  Pt had PEG tube place on 9/27/19
The pt.s wife came and told RN that pt wanted some food so she fed him some asian pudding.
pt unable to swallow crushed meds

## 2019-10-13 PROCEDURE — 80053 COMPREHEN METABOLIC PANEL: CPT

## 2019-10-13 PROCEDURE — 71045 X-RAY EXAM CHEST 1 VIEW: CPT

## 2019-10-13 PROCEDURE — 93970 EXTREMITY STUDY: CPT

## 2019-10-13 PROCEDURE — 83690 ASSAY OF LIPASE: CPT

## 2019-10-13 PROCEDURE — L8699: CPT

## 2019-10-13 PROCEDURE — 84100 ASSAY OF PHOSPHORUS: CPT

## 2019-10-13 PROCEDURE — 70450 CT HEAD/BRAIN W/O DYE: CPT

## 2019-10-13 PROCEDURE — 74230 X-RAY XM SWLNG FUNCJ C+: CPT

## 2019-10-13 PROCEDURE — 97162 PT EVAL MOD COMPLEX 30 MIN: CPT

## 2019-10-13 PROCEDURE — 80048 BASIC METABOLIC PNL TOTAL CA: CPT

## 2019-10-13 PROCEDURE — 97167 OT EVAL HIGH COMPLEX 60 MIN: CPT

## 2019-10-13 PROCEDURE — 92610 EVALUATE SWALLOWING FUNCTION: CPT

## 2019-10-13 PROCEDURE — 85730 THROMBOPLASTIN TIME PARTIAL: CPT

## 2019-10-13 PROCEDURE — 81001 URINALYSIS AUTO W/SCOPE: CPT

## 2019-10-13 PROCEDURE — 90686 IIV4 VACC NO PRSV 0.5 ML IM: CPT

## 2019-10-13 PROCEDURE — 84145 PROCALCITONIN (PCT): CPT

## 2019-10-13 PROCEDURE — 92611 MOTION FLUOROSCOPY/SWALLOW: CPT

## 2019-10-13 PROCEDURE — 85610 PROTHROMBIN TIME: CPT

## 2019-10-13 PROCEDURE — 87040 BLOOD CULTURE FOR BACTERIA: CPT

## 2019-10-13 PROCEDURE — 85027 COMPLETE CBC AUTOMATED: CPT

## 2019-10-13 PROCEDURE — 99285 EMERGENCY DEPT VISIT HI MDM: CPT | Mod: 25

## 2019-10-13 PROCEDURE — 87086 URINE CULTURE/COLONY COUNT: CPT

## 2019-10-13 PROCEDURE — 87186 SC STD MICRODIL/AGAR DIL: CPT

## 2019-10-13 PROCEDURE — 97530 THERAPEUTIC ACTIVITIES: CPT

## 2019-10-13 PROCEDURE — 83605 ASSAY OF LACTIC ACID: CPT

## 2019-10-13 PROCEDURE — 36415 COLL VENOUS BLD VENIPUNCTURE: CPT

## 2019-10-13 PROCEDURE — 83735 ASSAY OF MAGNESIUM: CPT

## 2019-10-13 PROCEDURE — A9698: CPT

## 2020-12-23 NOTE — H&P ADULT - MOTOR
Responded to patient. no drift in bilateral upper extremities, minimal movement in left lower extremity

## 2021-04-16 NOTE — SWALLOW VFSS/MBS ASSESSMENT ADULT - MODE OF PRESENTATION
self fed/fed by clinician/spoon No significant past surgical history spoon/fed by clinician/cup/self fed

## 2021-04-16 NOTE — SWALLOW BEDSIDE ASSESSMENT ADULT - SWALLOW EVAL: PATIENT/FAMILY GOALS STATEMENT
----- Message from Marni H Goldberg, MD sent at 4/16/2021 12:18 AM CDT -----  Please call. Patient has not looked at portal   Patient unable to participate with  phone.   services utilized with pt's wife to obtain history ( Aimee, #[id number]).  Per wife's report pt was sent to rehab with a PEG in 2015 and received swallow therapy.  PEG was removed shortly after admission to rehab and his diet was upgraded to regular prior to d/c home.  Reported he has been tolerating a regular diet ever since. Denied hx of PNA.  Stated he has a "bad habit" of holding food/liquids in his mouth for prolonged period of time, however, does not cough during PO intake.

## 2021-07-07 NOTE — SWALLOW VFSS/MBS ASSESSMENT ADULT - POSITIONING
This CM assistant closed the patients bundle episode Final -Respiratory signs and symptoms is ineligible  email sent to SNF to inform of the bundle closure  I have removed myself off of the care team, updated the closure form, and resolved the episode 
Lateral
Discharged

## 2021-07-09 NOTE — H&P ADULT - PROBLEM SELECTOR PLAN 1
Admit to med/surg - 2/2 UTI with fever, tachy, mild leukocytosis. Nonseptic appearing, infection still likely more localized with some systemic response.  - continue Rocephin  - s/p IVF resuscitation, continue gentle IVF until better able to tolerate PO  - tylenol prn for fever  - follow culture data, urine/blood cx drawn  - monitor fever curve  - hemodynamically stable, no present evidence of shock ESRD on dialysis

## 2023-01-03 NOTE — H&P ADULT - NSICDXFAMHXPERTINENTNEGATIVE_GEN_A_CORE_FT
not known Olanzapine Pregnancy And Lactation Text: This medication is pregnancy category C.   There are no adequate and well controlled trials with olanzapine in pregnant females.  Olanzapine should be used during pregnancy only if the potential benefit justifies the potential risk to the fetus.   In a study in lactating healthy women, olanzapine was excreted in breast milk.  It is recommended that women taking olanzapine should not breast feed.

## 2023-11-30 NOTE — PROGRESS NOTE ADULT - PROBLEM SELECTOR PLAN 10
Acute UTI Stable - continue trazodone    Problem/Plan 11   IMPROVE VTE Individual Risk Assessment        RISK                                                          Points  [  ] Previous VTE                                                3  [  ] Thrombophilia                                             2  [ x ] Lower limb paralysis                                   2        (unable to hold up >15 seconds)    [  ] Current Cancer                                             2         (within 6 months)  [ x ] Immobilization > 24 hrs                              1  [  ] ICU/CCU stay > 24 hours                             1  [ x ] Age > 60                                                         1  IMPROVE VTE Score: 4  DVT ppx: lovenox  Continue vitamins

## 2024-01-18 NOTE — H&P ADULT - PROBLEM SELECTOR PROBLEM 3
Assumed care of PT A&O 4. Pt resting in bed with no signs of labored breathing. On RA. Tele monitor in place, cardiac rhythm being monitored. Call light within reach, bed in lowest position, upper bed rails up. Pt was updated on plan of care for the day.    Altered mental status normal...

## 2024-02-20 NOTE — ED ADULT TRIAGE NOTE - NS ED NOTE AC HIGH RISK COUNTRIES
Chronic, controlled. Latest blood pressure and vitals reviewed-     Temp:  [97.6 °F (36.4 °C)-98.5 °F (36.9 °C)]   Pulse:  [61-87]   Resp:  [16-18]   BP: (108-148)/(70-99)   SpO2:  [97 %-100 %] .   Home meds for hypertension were reviewed and noted below.   Hypertension Medications               NIFEdipine (PROCARDIA-XL) 60 MG (OSM) 24 hr tablet Take 1 tablet (60 mg total) by mouth once daily.            While in the hospital, will manage blood pressure as follows; Continue home antihypertensive regimen    Will utilize p.r.n. blood pressure medication only if patient's blood pressure greater than 180/110 and she develops symptoms such as worsening chest pain or shortness of breath.   No

## 2024-03-13 NOTE — PROGRESS NOTE ADULT - PROBLEM/PLAN-2
Return to the ER for new or worsening symptoms  
DISPLAY PLAN FREE TEXT

## 2024-05-22 NOTE — ED PROVIDER NOTE - SKIN, MLM
The patient is Stable - Low risk of patient condition declining or worsening    Shift Goals  Clinical Goals: pt will have blood sugar and pain managed throughout shift  Patient Goals: pain control, comfort  Family Goals: KATHERINE    Progress made toward(s) clinical / shift goals:  pt alert and oriented, able to make needs known. Uses call light appropriately. VSS. Medications administered per MAR. Other needs met at this time. Bed locked in lowest position, call light and personal belongings in reach.    Patient is not progressing towards the following goals:       Skin normal color for race, warm, dry and intact. No evidence of rash.

## 2024-08-20 NOTE — H&P ADULT - SENSORY
Patient's pre-CT creatinine elevated at 1.34. Dr. Leal recommends patient have pre/post-IV hydration. Orders entered.    intact

## 2024-08-23 NOTE — PHYSICAL THERAPY INITIAL EVALUATION ADULT - PREDICTED DURATION OF THERAPY (DAYS/WKS), PT EVAL
Dr. Hernandez called into emergent OR case. Offered pt upcoming dates with Dr. Hernandez but times/date conflicted with patient's other schedule/appointment. Patient was rescheduled to 8/26/2024 10:30 AM with Dr. Nuno. Check in 15 minutes prior. Apologized to patient and wife for wait time and inconvenience.   
2 wks

## 2024-12-08 NOTE — ED PROVIDER NOTE - PROGRESS NOTE DETAILS
person/place Jaspreet Camara, PGY-1 EM: radiology called Dr. Christina, evidence of acute hemorrhagic stroke.  Will call neurosurgery and neurology to evaluate the patient. oriented to person, place, time and situation

## 2025-04-11 NOTE — SWALLOW VFSS/MBS ASSESSMENT ADULT - RECOMMENDED CONSISTENCY
Prepped: groin. Prepped with: Hibiclens. The site was clipped. 1. Oral nutrition/hydration is contraindicated at this time.   2. Consider short-term vs long-term non-oral means of nutrition/hydration as the patient is at an increased nutritional and aspiration risk at this time.  3. Discuss the goals of care regarding the nutritional plan of care with the patient and his family members.   4. Strict aspiration precautions  5. Strict oral care